# Patient Record
Sex: FEMALE | Race: WHITE | NOT HISPANIC OR LATINO | Employment: OTHER | URBAN - METROPOLITAN AREA
[De-identification: names, ages, dates, MRNs, and addresses within clinical notes are randomized per-mention and may not be internally consistent; named-entity substitution may affect disease eponyms.]

---

## 2017-01-01 PROBLEM — Z82.49 FAMILY HISTORY OF PREMATURE CORONARY ARTERY DISEASE: Status: ACTIVE | Noted: 2017-01-01

## 2017-06-21 DIAGNOSIS — E11.22 TYPE 2 DIABETES MELLITUS WITH STAGE 3 CHRONIC KIDNEY DISEASE, WITHOUT LONG-TERM CURRENT USE OF INSULIN (H): ICD-10-CM

## 2017-06-21 DIAGNOSIS — N18.30 TYPE 2 DIABETES MELLITUS WITH STAGE 3 CHRONIC KIDNEY DISEASE, WITHOUT LONG-TERM CURRENT USE OF INSULIN (H): ICD-10-CM

## 2017-06-21 NOTE — LETTER
[facility]                                           [address] [place]  [address]    June 26, 2017    Merline Max  [id number] [facility] [address]        Dear Merline,       We recently received a refill request for metFORMIN (GLUCOPHAGE) 500 MG tablet.  We have refilled this for a one time 30 day supply only because you are due for a:    Diabetes office visit and fasting lab appointment      Please schedule this lab appointment 4-5 days prior to the office visit.     Please call at your earliest convenience so that there will not be a delay with your future refills.          Thank you,   Your [facility] Care Team/carmelita  [phone]

## 2017-07-07 ENCOUNTER — TELEPHONE (OUTPATIENT)
Dept: ORTHOPEDICS | Facility: CLINIC | Age: 62
End: 2017-07-07

## 2017-09-24 ENCOUNTER — HEALTH MAINTENANCE LETTER (OUTPATIENT)
Age: 62
End: 2017-09-24

## 2018-05-15 ENCOUNTER — TRANSFERRED RECORDS (OUTPATIENT)
Dept: HEALTH INFORMATION MANAGEMENT | Facility: CLINIC | Age: 63
End: 2018-05-15

## 2020-01-09 ENCOUNTER — OFFICE VISIT (OUTPATIENT)
Dept: FAMILY MEDICINE | Facility: CLINIC | Age: 65
End: 2020-01-09
Payer: MEDICARE

## 2020-01-09 VITALS
BODY MASS INDEX: 32.62 KG/M2 | SYSTOLIC BLOOD PRESSURE: 130 MMHG | HEART RATE: 89 BPM | HEIGHT: 66 IN | WEIGHT: 203 LBS | DIASTOLIC BLOOD PRESSURE: 81 MMHG

## 2020-01-09 DIAGNOSIS — E78.5 HYPERLIPIDEMIA LDL GOAL <100: ICD-10-CM

## 2020-01-09 DIAGNOSIS — N18.30 TYPE 2 DIABETES MELLITUS WITH STAGE 3 CHRONIC KIDNEY DISEASE, WITHOUT LONG-TERM CURRENT USE OF INSULIN (H): Primary | ICD-10-CM

## 2020-01-09 DIAGNOSIS — I10 ESSENTIAL HYPERTENSION, BENIGN: ICD-10-CM

## 2020-01-09 DIAGNOSIS — Z80.0 FAMILY HISTORY OF COLON CANCER: ICD-10-CM

## 2020-01-09 DIAGNOSIS — N18.30 CKD (CHRONIC KIDNEY DISEASE) STAGE 3, GFR 30-59 ML/MIN (H): ICD-10-CM

## 2020-01-09 DIAGNOSIS — Z12.31 ENCOUNTER FOR SCREENING MAMMOGRAM FOR BREAST CANCER: ICD-10-CM

## 2020-01-09 DIAGNOSIS — E11.22 TYPE 2 DIABETES MELLITUS WITH STAGE 3 CHRONIC KIDNEY DISEASE, WITHOUT LONG-TERM CURRENT USE OF INSULIN (H): Primary | ICD-10-CM

## 2020-01-09 DIAGNOSIS — Z86.0100 HISTORY OF COLONIC POLYPS: ICD-10-CM

## 2020-01-09 DIAGNOSIS — Z23 NEED FOR PNEUMOCOCCAL VACCINE: ICD-10-CM

## 2020-01-09 LAB
ALBUMIN SERPL-MCNC: 4.1 G/DL (ref 3.4–5)
ALP SERPL-CCNC: 97 U/L (ref 40–150)
ALT SERPL W P-5'-P-CCNC: 99 U/L (ref 0–50)
ANION GAP SERPL CALCULATED.3IONS-SCNC: 8 MMOL/L (ref 3–14)
AST SERPL W P-5'-P-CCNC: 98 U/L (ref 0–45)
BILIRUB SERPL-MCNC: 0.7 MG/DL (ref 0.2–1.3)
BUN SERPL-MCNC: 35 MG/DL (ref 7–30)
CALCIUM SERPL-MCNC: 9.3 MG/DL (ref 8.5–10.1)
CHLORIDE SERPL-SCNC: 106 MMOL/L (ref 94–109)
CHOLEST SERPL-MCNC: 177 MG/DL
CO2 SERPL-SCNC: 27 MMOL/L (ref 20–32)
CREAT SERPL-MCNC: 1.02 MG/DL (ref 0.52–1.04)
CREAT UR-MCNC: 64 MG/DL
GFR SERPL CREATININE-BSD FRML MDRD: 58 ML/MIN/{1.73_M2}
GLUCOSE SERPL-MCNC: 119 MG/DL (ref 70–99)
HBA1C MFR BLD: 6.3 % (ref 0–5.6)
HDLC SERPL-MCNC: 44 MG/DL
LDLC SERPL CALC-MCNC: 86 MG/DL
MICROALBUMIN UR-MCNC: 1490 MG/L
MICROALBUMIN/CREAT UR: 2339.09 MG/G CR (ref 0–25)
NONHDLC SERPL-MCNC: 133 MG/DL
POTASSIUM SERPL-SCNC: 4.1 MMOL/L (ref 3.4–5.3)
PROT SERPL-MCNC: 8.3 G/DL (ref 6.8–8.8)
SODIUM SERPL-SCNC: 141 MMOL/L (ref 133–144)
T4 FREE SERPL-MCNC: 1.01 NG/DL (ref 0.76–1.46)
TRIGL SERPL-MCNC: 237 MG/DL
TSH SERPL DL<=0.005 MIU/L-ACNC: 4.37 MU/L (ref 0.4–4)

## 2020-01-09 PROCEDURE — 83036 HEMOGLOBIN GLYCOSYLATED A1C: CPT | Performed by: FAMILY MEDICINE

## 2020-01-09 PROCEDURE — G0009 ADMIN PNEUMOCOCCAL VACCINE: HCPCS | Performed by: FAMILY MEDICINE

## 2020-01-09 PROCEDURE — 80061 LIPID PANEL: CPT | Performed by: FAMILY MEDICINE

## 2020-01-09 PROCEDURE — 99203 OFFICE O/P NEW LOW 30 MIN: CPT | Mod: 25 | Performed by: FAMILY MEDICINE

## 2020-01-09 PROCEDURE — 99207 C FOOT EXAM  NO CHARGE: CPT | Performed by: FAMILY MEDICINE

## 2020-01-09 PROCEDURE — 84439 ASSAY OF FREE THYROXINE: CPT | Performed by: FAMILY MEDICINE

## 2020-01-09 PROCEDURE — 36415 COLL VENOUS BLD VENIPUNCTURE: CPT | Performed by: FAMILY MEDICINE

## 2020-01-09 PROCEDURE — 90670 PCV13 VACCINE IM: CPT | Performed by: FAMILY MEDICINE

## 2020-01-09 PROCEDURE — 82043 UR ALBUMIN QUANTITATIVE: CPT | Performed by: FAMILY MEDICINE

## 2020-01-09 PROCEDURE — 84443 ASSAY THYROID STIM HORMONE: CPT | Performed by: FAMILY MEDICINE

## 2020-01-09 PROCEDURE — 80053 COMPREHEN METABOLIC PANEL: CPT | Performed by: FAMILY MEDICINE

## 2020-01-09 ASSESSMENT — MIFFLIN-ST. JEOR: SCORE: 1482.55

## 2020-01-09 NOTE — PROGRESS NOTES
Subjective     Merline Max is a 64 year old female who presents to clinic today for the following health issues:    HPI   Diabetes Follow-up      How often are you checking your blood sugar? Once a month    What concerns do you have today about your diabetes? None     Do you have any of these symptoms? (Select all that apply)  No numbness or tingling in feet.  No redness, sores or blisters on feet.  No complaints of excessive thirst.  No reports of blurry vision.  No significant changes to weight. that she thinks is related to diabetes     Have you had a diabetic eye exam in the last 12 months? Yes- Date of last eye exam: 05/2019    Diabetes Management Resources    Hyperlipidemia Follow-Up      Are you regularly taking any medication or supplement to lower your cholesterol?   No    Are you having muscle aches or other side effects that you think could be caused by your cholesterol lowering medication?  No    Hypertension Follow-up      Do you check your blood pressure regularly outside of the clinic? Yes     Are you following a low salt diet? Yes    Are your blood pressures ever more than 140 on the top number (systolic) OR more   than 90 on the bottom number (diastolic), for example 140/90? No    BP Readings from Last 2 Encounters:   01/09/20 130/81   12/29/16 126/82     Hemoglobin A1C (%)   Date Value   01/09/2020 6.3 (H)   12/29/2016 6.1 (H)     LDL Cholesterol Calculated (mg/dL)   Date Value   06/15/2016 68   07/16/2012 86         Pt returning [facility] clinic from [facility]    Has diabetes well controlled on metformin 1000 bid  Is on statin/arb and aspirin  Due for eye exam and plans to have it completed here    Has stage 3 CKD on arb  BP well controlled on meds  On statin for cholesterol  Due for pneumo 13 and agreeable  Has personal h/o colon polyps and family history so will schedule colonoscopy              Reviewed and updated as needed this visit by Provider         Review of Systems   ROS COMP: Constitutional,  "HEENT, cardiovascular, pulmonary, gi and gu systems are negative, except as otherwise noted.      Objective    /81   Pulse 89   Ht 1.676 m (5' 6\")   Wt 92.1 kg (203 lb)   BMI 32.77 kg/m    Body mass index is 32.77 kg/m .  Physical Exam   GENERAL: healthy, alert and no distress  HENT: ear canals and TM's normal, nose and mouth without ulcers or lesions  NECK: no adenopathy, no asymmetry, masses, or scars and thyroid normal to palpation  RESP: lungs clear to auscultation - no rales, rhonchi or wheezes  CV: regular rate and rhythm, normal S1 S2, no S3 or S4, no murmur, click or rub, no peripheral edema and peripheral pulses strong  ABDOMEN: soft, nontender, no hepatosplenomegaly, no masses and bowel sounds normal  MS: no gross musculoskeletal defects noted, no edema    Diagnostic Test Results:  Labs reviewed in Epic        Assessment & Plan     1. Type 2 diabetes mellitus with stage 3 chronic kidney disease, without long-term current use of insulin (H)  At goal on meds, follow-up in 6 months  - Hemoglobin A1c  - TSH with free T4 reflex  - metFORMIN (GLUCOPHAGE) 500 MG tablet; TAKE 2 TABLETS BY MOUTH 2 TIMES DAILY WITH MEALS.-  Dispense: 360 tablet; Refill: 1    2. Essential hypertension, benign  At goal on meds  - Comprehensive metabolic panel    3. CKD (chronic kidney disease) stage 3, GFR 30-59 ml/min (H)  Stable on arb  - Albumin Random Urine Quantitative with Creat Ratio  - Comprehensive metabolic panel    4. Hyperlipidemia LDL goal <100  On statin  - Lipid panel reflex to direct LDL Fasting    5. Need for pneumococcal vaccine  given  - Pneumococcal vaccine 13 valent PCV13 IM (Prevnar) [73962]    6. Family history of colon cancer  Pt to schedule  - GASTROENTEROLOGY ADULT REF PROCEDURE ONLY [facility] [phone]    7. History of colonic polyps  same  - GASTROENTEROLOGY ADULT REF PROCEDURE ONLY [facility] [phone]    8. Encounter for screening mammogram for breast cancer  Pt to " "schedule  - *MA Screening Digital Bilateral; Future     BMI:   Estimated body mass index is 32.77 kg/m  as calculated from the following:    Height as of this encounter: 1.676 m (5' 6\").    Weight as of this encounter: 92.1 kg (203 lb).   Weight management plan: Discussed healthy diet and exercise guidelines            No follow-ups on file.    Tamika Nair MD  [facility]    "

## 2020-01-09 NOTE — NURSING NOTE
Prior to immunization administration, verified patients identity using patient s name and date of birth. Please see Immunization Activity for additional information.     Screening Questionnaire for Adult Immunization    Are you sick today?   No   Do you have allergies to medications, food, a vaccine component or latex?   No   Have you ever had a serious reaction after receiving a vaccination?   No   Do you have a long-term health problem with heart, lung, kidney, or metabolic disease (e.g., diabetes), asthma, a blood disorder, no spleen, complement component deficiency, a cochlear implant, or a spinal fluid leak?  Are you on long-term aspirin therapy?   yes   Do you have cancer, leukemia, HIV/AIDS, or any other immune system problem?   No   Do you have a parent, brother, or sister with an immune system problem?   No   In the past 3 months, have you taken medications that affect  your immune system, such as prednisone, other steroids, or anticancer drugs; drugs for the treatment of rheumatoid arthritis, Crohn s disease, or psoriasis; or have you had radiation treatments?   No   Have you had a seizure, or a brain or other nervous system problem?   No   During the past year, have you received a transfusion of blood or blood    products, or been given immune (gamma) globulin or antiviral drug?   No   For women: Are you pregnant or is there a chance you could become       pregnant during the next month?   No   Have you received any vaccinations in the past 4 weeks?   No     Immunization questionnaire was positive for at least one answer.  Notified Dr. Tamika Juarez  .           Screening performed by Sandra Almanza MA on 1/9/2020 at 12:05 PM.

## 2020-02-10 ASSESSMENT — MIFFLIN-ST. JEOR: SCORE: 1446.26

## 2020-02-14 ENCOUNTER — HOSPITAL ENCOUNTER (OUTPATIENT)
Facility: AMBULATORY SURGERY CENTER | Age: 65
Discharge: HOME | End: 2020-02-14
Attending: SURGERY | Admitting: SURGERY
Payer: MEDICARE

## 2020-02-14 VITALS
OXYGEN SATURATION: 96 % | RESPIRATION RATE: 16 BRPM | TEMPERATURE: 97.8 F | WEIGHT: 195 LBS | SYSTOLIC BLOOD PRESSURE: 107 MMHG | DIASTOLIC BLOOD PRESSURE: 67 MMHG | BODY MASS INDEX: 31.34 KG/M2 | HEART RATE: 74 BPM | HEIGHT: 66 IN

## 2020-02-14 LAB
COLONOSCOPY: NORMAL
GLUCOSE BLDC GLUCOMTR-MCNC: 112 MG/DL (ref 70–99)

## 2020-02-14 PROCEDURE — G0500 MOD SEDAT ENDO SERVICE >5YRS: HCPCS | Performed by: SURGERY

## 2020-02-14 PROCEDURE — 45385 COLONOSCOPY W/LESION REMOVAL: CPT | Mod: PT | Performed by: SURGERY

## 2020-02-14 PROCEDURE — G8916 PT W IV AB GIVEN ON TIME: HCPCS

## 2020-02-14 PROCEDURE — G8918 PT W/O PREOP ORDER IV AB PRO: HCPCS

## 2020-02-14 PROCEDURE — G8907 PT DOC NO EVENTS ON DISCHARG: HCPCS

## 2020-02-14 PROCEDURE — 45385 COLONOSCOPY W/LESION REMOVAL: CPT | Mod: PT

## 2020-02-14 RX ORDER — FLUMAZENIL 0.1 MG/ML
0.2 INJECTION, SOLUTION INTRAVENOUS
Status: DISCONTINUED | OUTPATIENT
Start: 2020-02-14 | End: 2020-02-15 | Stop reason: HOSPADM

## 2020-02-14 RX ORDER — ONDANSETRON 2 MG/ML
4 INJECTION INTRAMUSCULAR; INTRAVENOUS EVERY 6 HOURS PRN
Status: DISCONTINUED | OUTPATIENT
Start: 2020-02-14 | End: 2020-02-15 | Stop reason: HOSPADM

## 2020-02-14 RX ORDER — FENTANYL CITRATE 50 UG/ML
INJECTION, SOLUTION INTRAMUSCULAR; INTRAVENOUS PRN
Status: DISCONTINUED | OUTPATIENT
Start: 2020-02-14 | End: 2020-02-14 | Stop reason: HOSPADM

## 2020-02-14 RX ORDER — ONDANSETRON 2 MG/ML
4 INJECTION INTRAMUSCULAR; INTRAVENOUS
Status: DISCONTINUED | OUTPATIENT
Start: 2020-02-14 | End: 2020-02-15 | Stop reason: HOSPADM

## 2020-02-14 RX ORDER — LIDOCAINE 40 MG/G
CREAM TOPICAL
Status: DISCONTINUED | OUTPATIENT
Start: 2020-02-14 | End: 2020-02-15 | Stop reason: HOSPADM

## 2020-02-14 RX ORDER — NALOXONE HYDROCHLORIDE 0.4 MG/ML
.1-.4 INJECTION, SOLUTION INTRAMUSCULAR; INTRAVENOUS; SUBCUTANEOUS
Status: DISCONTINUED | OUTPATIENT
Start: 2020-02-14 | End: 2020-02-15 | Stop reason: HOSPADM

## 2020-02-14 RX ORDER — ONDANSETRON 4 MG/1
4 TABLET, ORALLY DISINTEGRATING ORAL EVERY 6 HOURS PRN
Status: DISCONTINUED | OUTPATIENT
Start: 2020-02-14 | End: 2020-02-15 | Stop reason: HOSPADM

## 2020-02-19 LAB — COPATH REPORT: NORMAL

## 2020-02-23 ENCOUNTER — HEALTH MAINTENANCE LETTER (OUTPATIENT)
Age: 65
End: 2020-02-23

## 2020-05-06 ENCOUNTER — TELEPHONE (OUTPATIENT)
Dept: LAB | Facility: CLINIC | Age: 65
End: 2020-05-06

## 2020-05-06 DIAGNOSIS — N18.30 CHRONIC KIDNEY DISEASE, STAGE III (MODERATE) (H): Primary | ICD-10-CM

## 2020-05-06 DIAGNOSIS — R80.9 PROTEINURIA: ICD-10-CM

## 2020-05-06 DIAGNOSIS — I10 HYPERTENSION, ESSENTIAL: ICD-10-CM

## 2020-05-06 DIAGNOSIS — E78.5 HYPERLIPEMIA: ICD-10-CM

## 2020-05-06 NOTE — TELEPHONE ENCOUNTER
Patient has orders for a blood pressure, pulse and weight from Dr.George Morgan at [facility].  Patient will make an Anicllary appointment with Lab appointment to follow on a day that her primary provider,  will be in clinic.  Please fax vitals to [phone].  Office phone number is [phone] if needed. Order will be abstracted.      Thank you-Tiffany Hui on 5/6/2020 at 5:13 PM

## 2020-05-11 ENCOUNTER — ALLIED HEALTH/NURSE VISIT (OUTPATIENT)
Dept: NURSING | Facility: CLINIC | Age: 65
End: 2020-05-11
Payer: MEDICARE

## 2020-05-11 VITALS
WEIGHT: 203 LBS | SYSTOLIC BLOOD PRESSURE: 102 MMHG | OXYGEN SATURATION: 98 % | DIASTOLIC BLOOD PRESSURE: 68 MMHG | HEART RATE: 101 BPM | BODY MASS INDEX: 32.77 KG/M2

## 2020-05-11 DIAGNOSIS — N18.30 CKD (CHRONIC KIDNEY DISEASE) STAGE 3, GFR 30-59 ML/MIN (H): Primary | ICD-10-CM

## 2020-05-11 DIAGNOSIS — I10 HYPERTENSION, ESSENTIAL: ICD-10-CM

## 2020-05-11 DIAGNOSIS — E78.5 HYPERLIPEMIA: ICD-10-CM

## 2020-05-11 DIAGNOSIS — R80.9 PROTEINURIA: ICD-10-CM

## 2020-05-11 DIAGNOSIS — N18.30 CHRONIC KIDNEY DISEASE, STAGE III (MODERATE) (H): ICD-10-CM

## 2020-05-11 LAB
ALBUMIN UR-MCNC: 30 MG/DL
ANION GAP SERPL CALCULATED.3IONS-SCNC: 6 MMOL/L (ref 3–14)
APPEARANCE UR: CLEAR
BILIRUB UR QL STRIP: NEGATIVE
BUN SERPL-MCNC: 29 MG/DL (ref 7–30)
CALCIUM SERPL-MCNC: 9.5 MG/DL (ref 8.5–10.1)
CHLORIDE SERPL-SCNC: 104 MMOL/L (ref 94–109)
CO2 SERPL-SCNC: 30 MMOL/L (ref 20–32)
COLOR UR AUTO: YELLOW
CREAT SERPL-MCNC: 1.08 MG/DL (ref 0.52–1.04)
CREAT UR-MCNC: 82 MG/DL
GFR SERPL CREATININE-BSD FRML MDRD: 54 ML/MIN/{1.73_M2}
GLUCOSE SERPL-MCNC: 127 MG/DL (ref 70–99)
GLUCOSE UR STRIP-MCNC: NEGATIVE MG/DL
HGB UR QL STRIP: ABNORMAL
KETONES UR STRIP-MCNC: NEGATIVE MG/DL
LEUKOCYTE ESTERASE UR QL STRIP: ABNORMAL
NITRATE UR QL: NEGATIVE
PH UR STRIP: 5 PH (ref 5–7)
POTASSIUM SERPL-SCNC: 4.2 MMOL/L (ref 3.4–5.3)
PROT UR-MCNC: 0.51 G/L
PROT/CREAT 24H UR: 0.62 G/G CR (ref 0–0.2)
RBC #/AREA URNS AUTO: ABNORMAL /HPF
SODIUM SERPL-SCNC: 140 MMOL/L (ref 133–144)
SOURCE: ABNORMAL
SP GR UR STRIP: 1.02 (ref 1–1.03)
UROBILINOGEN UR STRIP-ACNC: 0.2 EU/DL (ref 0.2–1)
WBC #/AREA URNS AUTO: ABNORMAL /HPF

## 2020-05-11 PROCEDURE — 99207 ZZC NO CHARGE NURSE ONLY: CPT

## 2020-05-11 PROCEDURE — 81001 URINALYSIS AUTO W/SCOPE: CPT | Performed by: INTERNAL MEDICINE

## 2020-05-11 PROCEDURE — 80048 BASIC METABOLIC PNL TOTAL CA: CPT | Performed by: INTERNAL MEDICINE

## 2020-05-11 PROCEDURE — 84156 ASSAY OF PROTEIN URINE: CPT | Performed by: INTERNAL MEDICINE

## 2020-05-11 PROCEDURE — 36415 COLL VENOUS BLD VENIPUNCTURE: CPT | Performed by: INTERNAL MEDICINE

## 2020-05-11 NOTE — PROGRESS NOTES
Merline Max is a 64 year old patient who comes in today for a Blood Pressure check.  Initial BP:  /68   Pulse 101   Wt 92.1 kg (203 lb)   SpO2 98%   BMI 32.77 kg/m       101  Disposition: faxed to: Dr. Finn Morgan @ [phone]  [facility].          Tameka Sun MA

## 2020-05-11 NOTE — LETTER
Merline Max is a 64 year old patient who comes in today for a Blood Pressure check.  Initial BP:  /68   Pulse 101   Wt 92.1 kg (203 lb)   SpO2 98%   BMI 32.77 kg/m       101  Disposition: faxed to: Dr. Finn Morgan @ [phone]  [facility].          Tameka Sun MA

## 2020-06-09 ENCOUNTER — TRANSFERRED RECORDS (OUTPATIENT)
Dept: HEALTH INFORMATION MANAGEMENT | Facility: CLINIC | Age: 65
End: 2020-06-09

## 2020-07-14 NOTE — PROGRESS NOTES
Subjective     Merline Max is a 64 year old female who presents to clinic today for the following health issues:    HPI       Diabetes Follow-up      How often are you checking your blood sugar? Not at all    What concerns do you have today about your diabetes? None     Do you have any of these symptoms? (Select all that apply)  Numbness in feet    Have you had a diabetic eye exam in the last 12 months? No delayed due to covid, will make an appointment to day          Hyperlipidemia Follow-Up      Are you regularly taking any medication or supplement to lower your cholesterol?   Yes- simvastatin    Are you having muscle aches or other side effects that you think could be caused by your cholesterol lowering medication?  No    Hypertension Follow-up      Do you check your blood pressure regularly outside of the clinic? Yes     Are you following a low salt diet? No    Are your blood pressures ever more than 140 on the top number (systolic) OR more   than 90 on the bottom number (diastolic), for example 140/90? No    BP Readings from Last 2 Encounters:   07/15/20 135/80   05/11/20 102/68     Hemoglobin A1C (%)   Date Value   07/15/2020 6.0 (H)   01/09/2020 6.3 (H)     LDL Cholesterol Calculated (mg/dL)   Date Value   01/09/2020 86   06/15/2016 68     Pt with type 2 diabetes on metformin.   It is well controlled  Is on arb, aspirin and statin  Overdue for eye exam    Pt with CKD on arb. It is stable. She sees nephrology  Pt with HTN on hydrochlorothiazide and avapro it is well controlled today  Pt with elevated liver enzymes at last check . Will recheck these  Pt with elevated cholesterol on statin  Pt due for pneumonia shot and is agreeable    Pt with bilateral hip pain , worse on right than left  Worse with use  Pain mainly over lateral hip more than back or groin.   Pain can radiate down side of leg                Reviewed and updated as needed this visit by Provider         Review of Systems   Constitutional, HEENT,  "cardiovascular, pulmonary, gi and gu systems are negative, except as otherwise noted.      Objective    /80   Pulse 84   Temp 98.1  F (36.7  C) (Oral)   Ht 1.676 m (5' 6\")   Wt 90.7 kg (200 lb)   BMI 32.28 kg/m    Body mass index is 32.28 kg/m .  Physical Exam   GENERAL: healthy, alert and no distress  EYES: Eyes grossly normal to inspection, PERRL and conjunctivae and sclerae normal  HENT: ear canals and TM's normal, nose and mouth without ulcers or lesions  NECK: no adenopathy, no asymmetry, masses, or scars and thyroid normal to palpation  RESP: lungs clear to auscultation - no rales, rhonchi or wheezes  CV: regular rate and rhythm, normal S1 S2, no S3 or S4, no murmur, click or rub, no peripheral edema and peripheral pulses strong  ABDOMEN: soft, nontender, no hepatosplenomegaly, no masses and bowel sounds normal  MS: no gross musculoskeletal defects noted, no edema, pain to palpation over right greater trochanter. No lumbar spine or paraspinous muscle pain. No pain with internal/external rotation of hip  NEURO: Normal strength and tone, mentation intact and speech normal  PSYCH: mentation appears normal, affect normal/bright    Diagnostic Test Results:  Labs reviewed in Epic        Assessment & Plan     1. Encounter for Medicare annual wellness exam  See below    2. Type 2 diabetes mellitus with stage 3 chronic kidney disease, without long-term current use of insulin (H)  Well controlled on meds, follow-up in 6 months  - metFORMIN (GLUCOPHAGE) 500 MG tablet; Take 2 tablets (1,000 mg) by mouth 2 times daily (with meals)  Dispense: 360 tablet; Refill: 1  - Hemoglobin A1c  - OFFICE/OUTPT VISIT,EST,LEVL IV    3. CKD (chronic kidney disease) stage 3, GFR 30-59 ml/min (H)  Stable on meds  - OFFICE/OUTPT VISIT,EST,LEVL IV    4. Essential hypertension, benign  At goal on meds  - OFFICE/OUTPT VISIT,EST,LEVL IV    5. Elevated liver enzymes  recheck  - Hepatic panel (Albumin, ALT, AST, Bili, Alk Phos, TP)  - " "OFFICE/OUTPT VISIT,EST,LEVL IV    6. Trochanteric bursitis of both hips  Follow-up with sports med for treatment  - Orthopedic & Spine  Referral; Future  - OFFICE/OUTPT VISIT,EST,DACIAL IV    7. Hyperlipidemia LDL goal <100  On statin  - OFFICE/OUTPT VISIT,EST,LEVL IV    8. Need for pneumococcal vaccine  given  - Pneumococcal vaccine 23 valent PPSV23  (Pneumovax) [17752]     BMI:   Estimated body mass index is 32.28 kg/m  as calculated from the following:    Height as of this encounter: 1.676 m (5' 6\").    Weight as of this encounter: 90.7 kg (200 lb).   Weight management plan: Discussed healthy diet and exercise guidelines            Return in about 53 weeks (around 7/21/2021) for Annual Wellness Visit.    Tamika Brown MD  [facility]    SUBJECTIVE:   Merline Max is a 64 year old female who presents for Preventive Visit.      Are you in the first 12 months of your Medicare Part B coverage?  No    Physical Health:    In general, how would you rate your overall physical health? excellent    Outside of work, how many days during the week do you exercise? 4-5 days/week    Outside of work, approximately how many minutes a day do you exercise?15-30 minutes    If you drink alcohol do you typically have >3 drinks per day or >7 drinks per week? No    Do you usually eat at least 4 servings of fruit and vegetables a day, include whole grains & fiber and avoid regularly eating high fat or \"junk\" foods? Yes    Do you have any problems taking medications regularly?  No    Do you have any side effects from medications? none    Needs assistance for the following daily activities: no assistance needed    Which of the following safety concerns are present in your home?  none identified     Hearing impairment: No    In the past 6 months, have you been bothered by leaking of urine? no    Mental Health:    In general, how would you rate your overall mental or emotional health? excellent  PHQ-2 Score: " 0    Do you feel safe in your environment? Yes    Have you ever done Advance Care Planning? (For example, a Health Directive, POLST, or a discussion with a medical provider or your loved ones about your wishes): No, advance care planning information given to patient to review.  Patient declined advance care planning discussion at this time.    Additional concerns to address?  No    Fall risk:  Fallen 2 or more times in the past year?: No  Any fall with injury in the past year?: No    Cognitive Screenin) Repeat 3 items (Leader, Season, Table)      2) Clock draw:   NORMAL  3) 3 item recall: Recalls 3 objects  Results: 3 items recalled: COGNITIVE IMPAIRMENT LESS LIKELY    Mini-CogTM Copyright S Donya. Licensed by the author for use in [facility]; reprinted with permission (karolina@.[facility]). All rights reserved.      Do you have sleep apnea, excessive snoring or daytime drowsiness?: no            Reviewed and updated as needed this visit by clinical staff  Tobacco  Allergies  Meds  Med Hx  Surg Hx  Fam Hx  Soc Hx        Reviewed and updated as needed this visit by Provider        Social History     Tobacco Use     Smoking status: Never Smoker     Smokeless tobacco: Never Used     Tobacco comment: Lives in smoke free household   Substance Use Topics     Alcohol use: Yes     Comment: two to three drinks a week                           Current providers sharing in care for this patient include:   Patient Care Team:  Abida Polk [place] as PCP - General (Clinic)  Tamika Brown MD as Assigned PCP    The following health maintenance items are reviewed in Epic and correct as of today:  Health Maintenance   Topic Date Due     HEPATITIS B IMMUNIZATION (1 of 3 - Risk 3-dose series) 10/21/1973     ZOSTER IMMUNIZATION (1 of 2) 10/21/2004     EYE EXAM  11/15/2017     MAMMO SCREENING  2018     MEDICARE ANNUAL WELLNESS VISIT  10/21/2019     FALL RISK ASSESSMENT  10/21/2019     PNEUMOCOCCAL  "IMMUNIZATION 65+ HIGH/HIGHEST RISK (2 of 2 - PPSV23) 03/05/2020     A1C  07/09/2020     INFLUENZA VACCINE (1) 09/01/2020     LIPID  01/09/2021     DIABETIC FOOT EXAM  01/09/2021     BMP  05/11/2021     MICROALBUMIN  05/11/2021     ADVANCE CARE PLANNING  06/15/2021     DTAP/TDAP/TD IMMUNIZATION (3 - Td) 07/16/2022     COLORECTAL CANCER SCREENING  02/14/2023     DEXA  Completed     HEPATITIS C SCREENING  Completed     PHQ-2  Completed     HIV SCREENING  Addressed     IPV IMMUNIZATION  Aged Out     MENINGITIS IMMUNIZATION  Aged Out     Lab work is in process  Pneumonia Vaccine:Adults age 65+ who received Pneumovax (PPSV23) at 65 years or older: Should be given PCV13 > 1 year after their most recent PPSV23  Mammogram Screening: Mammogram Screening: Patient over age 50, mutual decision to screen reflected in health maintenance.    ROS:  Constitutional, HEENT, cardiovascular, pulmonary, gi and gu systems are negative, except as otherwise noted.    OBJECTIVE:   /80   Pulse 84   Temp 98.1  F (36.7  C) (Oral)   Ht 1.676 m (5' 6\")   Wt 90.7 kg (200 lb)   BMI 32.28 kg/m   Estimated body mass index is 32.28 kg/m  as calculated from the following:    Height as of this encounter: 1.676 m (5' 6\").    Weight as of this encounter: 90.7 kg (200 lb).  EXAM:   See other note for exam    Diagnostic Test Results:  Labs reviewed in Epic    ASSESSMENT / PLAN:   1. Encounter for Medicare annual wellness exam  completed  COUNSELING:  Reviewed preventive health counseling, as reflected in patient instructions       Regular exercise       Healthy diet/nutrition       Vision screening       Dental care    Estimated body mass index is 32.28 kg/m  as calculated from the following:    Height as of this encounter: 1.676 m (5' 6\").    Weight as of this encounter: 90.7 kg (200 lb).    Weight management plan: Discussed healthy diet and exercise guidelines     reports that she has never smoked. She has never used smokeless " tobacco.      Appropriate preventive services were discussed with this patient, including applicable screening as appropriate for cardiovascular disease, diabetes, osteopenia/osteoporosis, and glaucoma.  As appropriate for age/gender, discussed screening for colorectal cancer, prostate cancer, breast cancer, and cervical cancer. Checklist reviewing preventive services available has been given to the patient.    Reviewed patients plan of care and provided an AVS. The Intermediate Care Plan ( asthma action plan, low back pain action plan, and migraine action plan) for Merline meets the Care Plan requirement. This Care Plan has been established and reviewed with the Patient.    Counseling Resources:  ATP IV Guidelines  Pooled Cohorts Equation Calculator  Breast Cancer Risk Calculator  FRAX Risk Assessment  ICSI Preventive Guidelines  Dietary Guidelines for Americans, 2010  [employer] MyPlate  ASA Prophylaxis  Lung CA Screening    Tamika Brown MD  [facility]

## 2020-07-15 ENCOUNTER — OFFICE VISIT (OUTPATIENT)
Dept: FAMILY MEDICINE | Facility: CLINIC | Age: 65
End: 2020-07-15
Payer: MEDICARE

## 2020-07-15 VITALS
HEIGHT: 66 IN | DIASTOLIC BLOOD PRESSURE: 80 MMHG | HEART RATE: 84 BPM | TEMPERATURE: 98.1 F | BODY MASS INDEX: 32.14 KG/M2 | SYSTOLIC BLOOD PRESSURE: 135 MMHG | WEIGHT: 200 LBS

## 2020-07-15 DIAGNOSIS — R74.8 ELEVATED LIVER ENZYMES: ICD-10-CM

## 2020-07-15 DIAGNOSIS — M70.61 TROCHANTERIC BURSITIS OF BOTH HIPS: ICD-10-CM

## 2020-07-15 DIAGNOSIS — Z00.00 ENCOUNTER FOR MEDICARE ANNUAL WELLNESS EXAM: Primary | ICD-10-CM

## 2020-07-15 DIAGNOSIS — I10 ESSENTIAL HYPERTENSION, BENIGN: ICD-10-CM

## 2020-07-15 DIAGNOSIS — N18.30 TYPE 2 DIABETES MELLITUS WITH STAGE 3 CHRONIC KIDNEY DISEASE, WITHOUT LONG-TERM CURRENT USE OF INSULIN (H): ICD-10-CM

## 2020-07-15 DIAGNOSIS — M70.62 TROCHANTERIC BURSITIS OF BOTH HIPS: ICD-10-CM

## 2020-07-15 DIAGNOSIS — E78.5 HYPERLIPIDEMIA LDL GOAL <100: ICD-10-CM

## 2020-07-15 DIAGNOSIS — N18.30 CKD (CHRONIC KIDNEY DISEASE) STAGE 3, GFR 30-59 ML/MIN (H): ICD-10-CM

## 2020-07-15 DIAGNOSIS — Z23 NEED FOR PNEUMOCOCCAL VACCINE: ICD-10-CM

## 2020-07-15 DIAGNOSIS — E11.22 TYPE 2 DIABETES MELLITUS WITH STAGE 3 CHRONIC KIDNEY DISEASE, WITHOUT LONG-TERM CURRENT USE OF INSULIN (H): ICD-10-CM

## 2020-07-15 LAB
ALBUMIN SERPL-MCNC: 3.9 G/DL (ref 3.4–5)
ALP SERPL-CCNC: 84 U/L (ref 40–150)
ALT SERPL W P-5'-P-CCNC: 79 U/L (ref 0–50)
AST SERPL W P-5'-P-CCNC: 64 U/L (ref 0–45)
BILIRUB DIRECT SERPL-MCNC: 0.1 MG/DL (ref 0–0.2)
BILIRUB SERPL-MCNC: 0.6 MG/DL (ref 0.2–1.3)
HBA1C MFR BLD: 6 % (ref 0–5.6)
PROT SERPL-MCNC: 8.1 G/DL (ref 6.8–8.8)

## 2020-07-15 PROCEDURE — 36415 COLL VENOUS BLD VENIPUNCTURE: CPT | Performed by: FAMILY MEDICINE

## 2020-07-15 PROCEDURE — 99214 OFFICE O/P EST MOD 30 MIN: CPT | Mod: 25 | Performed by: FAMILY MEDICINE

## 2020-07-15 PROCEDURE — 90732 PPSV23 VACC 2 YRS+ SUBQ/IM: CPT | Performed by: FAMILY MEDICINE

## 2020-07-15 PROCEDURE — G0009 ADMIN PNEUMOCOCCAL VACCINE: HCPCS | Performed by: FAMILY MEDICINE

## 2020-07-15 PROCEDURE — 83036 HEMOGLOBIN GLYCOSYLATED A1C: CPT | Performed by: FAMILY MEDICINE

## 2020-07-15 PROCEDURE — 80076 HEPATIC FUNCTION PANEL: CPT | Performed by: FAMILY MEDICINE

## 2020-07-15 PROCEDURE — G0402 INITIAL PREVENTIVE EXAM: HCPCS | Performed by: FAMILY MEDICINE

## 2020-07-15 ASSESSMENT — MIFFLIN-ST. JEOR: SCORE: 1468.94

## 2020-07-17 ENCOUNTER — ANCILLARY PROCEDURE (OUTPATIENT)
Dept: MAMMOGRAPHY | Facility: CLINIC | Age: 65
End: 2020-07-17
Payer: MEDICARE

## 2020-07-17 DIAGNOSIS — Z12.31 ENCOUNTER FOR SCREENING MAMMOGRAM FOR BREAST CANCER: ICD-10-CM

## 2020-07-17 PROCEDURE — 77067 SCR MAMMO BI INCL CAD: CPT | Mod: TC

## 2020-07-18 ENCOUNTER — OFFICE VISIT (OUTPATIENT)
Dept: ORTHOPEDICS | Facility: CLINIC | Age: 65
End: 2020-07-18
Attending: FAMILY MEDICINE
Payer: MEDICARE

## 2020-07-18 ENCOUNTER — ANCILLARY PROCEDURE (OUTPATIENT)
Dept: GENERAL RADIOLOGY | Facility: CLINIC | Age: 65
End: 2020-07-18
Attending: PHYSICAL MEDICINE & REHABILITATION
Payer: MEDICARE

## 2020-07-18 VITALS
BODY MASS INDEX: 32.14 KG/M2 | HEIGHT: 66 IN | SYSTOLIC BLOOD PRESSURE: 121 MMHG | DIASTOLIC BLOOD PRESSURE: 80 MMHG | WEIGHT: 200 LBS

## 2020-07-18 DIAGNOSIS — M25.552 BILATERAL HIP PAIN: ICD-10-CM

## 2020-07-18 DIAGNOSIS — M25.552 BILATERAL HIP PAIN: Primary | ICD-10-CM

## 2020-07-18 DIAGNOSIS — M70.61 TROCHANTERIC BURSITIS OF BOTH HIPS: ICD-10-CM

## 2020-07-18 DIAGNOSIS — M25.551 BILATERAL HIP PAIN: Primary | ICD-10-CM

## 2020-07-18 DIAGNOSIS — M25.551 BILATERAL HIP PAIN: ICD-10-CM

## 2020-07-18 DIAGNOSIS — M70.62 TROCHANTERIC BURSITIS OF BOTH HIPS: ICD-10-CM

## 2020-07-18 PROCEDURE — 99204 OFFICE O/P NEW MOD 45 MIN: CPT | Performed by: PHYSICAL MEDICINE & REHABILITATION

## 2020-07-18 PROCEDURE — 73522 X-RAY EXAM HIPS BI 3-4 VIEWS: CPT

## 2020-07-18 ASSESSMENT — MIFFLIN-ST. JEOR: SCORE: 1468.94

## 2020-07-18 NOTE — LETTER
7/18/2020         RE: Merline Max  [id number]  [facility] [address]        Dear Colleague,    Thank you for referring your patient, Merline Max, to the [facility]. Please see a copy of my visit note below.    Sports Medicine Clinic Visit    PCP: Abida Columbus Gianni    CC: Patient presents with:  Left Hip - Pain  Right Hip - Pain      HPI:  Merline Max is a 64 year old female who is seen in consultation at the request of Dr. Brown.   She notes bilateral hip pain that began 5 years ago with gradually onset. She notes it alternates back and forth to the right and left hip. She notes once in awhile she feels pain in the groin and feels some instability. However, usually she has pain over the lateral hip that radiates down the leg. She has occasional back pain and has had sciatica in the past and this pain does not feel like that. She rates the pain at a  6/10 at its worst and a 3/10 currently.  Symptoms are relieved with massaging the lateral leg sometimes and 600 mg of ibuprofen. Symptoms are worsened by nothing specific. She endorses instability.   She denies popping, grinding, numbness, tingling and weakness.  Other treatment has included cold compresses and heat. She notes difficulty with sleeping sometimes.       Review of Systems:  Musculoskeletal: as above  Remainder of review of systems is negative including constitutional, eyes, ENT, CV, pulmonary, GI, , endocrine, skin, hematologic, and neurologic except as noted in HPI or medical history.    History reviewed. No pertinent past surgical/medical/family/social history other than as mentioned in HPI.    Patient Active Problem List   Diagnosis     Dry eye syndrome     Recurrent iritis right eye     Ocular hypertension     Type 2 diabetes, HbA1c goal < 7% (H)     Blurred vision     Gouty arthritis     Essential hypertension, benign     IgA nephropathy     Hyperlipidemia LDL goal <100     Fatigue     Elevated  LFTs     Colon polyp     Acute iritis left eye     Advanced directives, counseling/discussion     CKD (chronic kidney disease) stage 3, GFR 30-59 ml/min (H)     Family history of premature coronary artery disease     Past Medical History:   Diagnosis Date     Arthritis      Fatigue 2012     Fracture     stress foot     Gout      Hyperlipidemia      Hypertension      Nephropathy     IGA     Nonsenile cataract      Type 2 diabetes, HbA1c goal < 7% (H) 2012     Uveitis 11-    iritis right eye since      Past Surgical History:   Procedure Laterality Date     ABDOMEN SURGERY  , , , , ,    3 C-Sections, Total Hysterectomy, Gallbladder Removal,     BIOPSY KIDNEY      Ig A nephropathy     CHOLECYSTECTOMY       COLONOSCOPY  2013    Procedure: COLONOSCOPY;  COLONOSCOPY - POLYP/ SANTILLI;  Surgeon: Ulices Gregory MD;  Location: [facility]     COLONOSCOPY  10/1/2013    Procedure: COMBINED COLONOSCOPY, SINGLE BIOPSY/POLYPECTOMY BY BIOPSY;;  Surgeon: Ulices Gregory MD;  Location: [facility]     COLONOSCOPY N/A 2014    Procedure: COMBINED COLONOSCOPY, SINGLE OR MULTIPLE BIOPSY/POLYPECTOMY BY BIOPSY;  Surgeon: Ulices Gregory MD;  Location: [facility]     COLONOSCOPY WITH CO2 INSUFFLATION N/A 2014    Procedure: COLONOSCOPY WITH CO2 INSUFFLATION;  Surgeon: Ulices Gregory MD;  Location: [facility]     COLONOSCOPY WITH CO2 INSUFFLATION N/A 2020    Procedure: COLONOSCOPY, WITH CO2 INSUFFLATION;  Surgeon: Eliud Huang DO;  Location: [facility]     FOOT SURGERY      Lt foot.      GYN SURGERY       x3     HERNIORRHAPHY UMBILICAL       HYSTERECTOMY TOTAL ABDOMINAL, BILATERAL SALPINGO-OOPHORECTOMY, COMBINED      cyst, endometriosis     HYSTERECTOMY, PAP NO LONGER INDICATED       Family History   Problem Relation Age of Onset     Cancer Father         adrenal and lung     Other Cancer Father         Adrenal & Lung     Cancer Mother 64        colon       Colon Cancer Mother      Other Cancer Mother         Colon     Glaucoma Maternal Aunt      Thyroid Disease Maternal Aunt      Cancer Maternal Aunt      Glaucoma Maternal Grandmother         corneal transplant     EYE* Maternal Grandmother         corneal transplant     Hypertension Maternal Grandmother      Cerebrovascular Disease Maternal Grandmother 85        TIA     Cancer Maternal Grandfather      Cancer Paternal Grandmother      Asthma Daughter         SInce birth, small lung function     C.A.D. Brother 46        quad bypass     Breast Cancer Other 80        Maternal great aunt     Breast Cancer Other      Asthma Daughter      Diabetes No family hx of      Macular Degeneration No family hx of      Social History     Socioeconomic History     Marital status:      Spouse name: Not on file     Number of children: Not on file     Years of education: Not on file     Highest education level: Not on file   Occupational History     Not on file   Social Needs     Financial resource strain: Not on file     Food insecurity     Worry: Not on file     Inability: Not on file     Transportation needs     Medical: Not on file     Non-medical: Not on file   Tobacco Use     Smoking status: Never Smoker     Smokeless tobacco: Never Used     Tobacco comment: Lives in smoke free household   Substance and Sexual Activity     Alcohol use: Yes     Comment: two to three drinks a week     Drug use: No     Sexual activity: Yes     Partners: Male     Birth control/protection: Post-menopausal, Female Surgical   Lifestyle     Physical activity     Days per week: Not on file     Minutes per session: Not on file     Stress: Not on file   Relationships     Social connections     Talks on phone: Not on file     Gets together: Not on file     Attends [employer] service: Not on file     Active member of club or organization: Not on file     Attends meetings of clubs or organizations: Not on file     Relationship status: Not on file      "Intimate partner violence     Fear of current or ex partner: Not on file     Emotionally abused: Not on file     Physically abused: Not on file     Forced sexual activity: Not on file   Other Topics Concern     Parent/sibling w/ CABG, MI or angioplasty before 65F 55M? Yes     Comment: Brother had Quadruple Bypass Surgery at 46, still living   Social History Narrative     9/2/1983 Jean    They have three daughter Mily 1978, Jessica, 1984, Richa 1986    Grandchildren 6 plus one on the way.     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     Current Outpatient Medications   Medication     allopurinol (ZYLOPRIM) 100 MG tablet     Aspirin (ASPIR-81 PO)     blood glucose (BEATRIZ CONTOUR) test strip     cetirizine (ZYRTEC) 10 MG tablet     docusate sodium (COLACE) 100 MG capsule     hydrochlorothiazide (MICROZIDE) 12.5 MG capsule     irbesartan (AVAPRO) 300 MG tablet     ketotifen (ZADITOR) 0.025 % SOLN     Lancet Devices (AUTO-LANCET) MISC     lancets thin MISC     metFORMIN (GLUCOPHAGE) 500 MG tablet     methylcellulose, laxative, (CITRUCEL) POWD     polyethylene glycol - propylene glycol (SYSTANE) 0.4-0.3 % SOLN     simvastatin (ZOCOR) 20 MG tablet     No current facility-administered medications for this visit.      No Known Allergies      Objective:  /80   Ht 1.676 m (5' 6\")   Wt 90.7 kg (200 lb)   BMI 32.28 kg/m      General: Alert and in no distress    Head: Normocephalic, atraumatic  Eyes: no scleral icterus or conjunctival erythema   Skin: no erythema, petechiae, or jaundice  CV: regular rhythm by palpation, 2+ distal pulses  Resp: normal respiratory effort without conversational dyspnea   Psych: normal mood and affect    Gait: Non-antalgic, appropriate coordination and balance   Neuro: Motor strength and sensation as noted below    Musculoskeletal:    Bilateral hip exam    Inspection:      no edema or ecchymosis in hip " area    Palpation:  -Mildly tender over the left greater trochanteric bursa    ROM:     Full active and passive ROM bilaterally.  Left hip internal rotation causes mild left lateral hip pain.    Strength:   Left:  5/5 hip flexion/abduction/adduction, 5/5 knee extension/flexion, 5/5 ankle dorsiflexion/plantarflexion  Right:  5/5 hip flexion/abduction/adduction, 5/5 knee extension/flexion, 5/5 ankle dorsiflexion/plantarflexion    Sensation: grossly intact to light touch in the lower extremities bilaterally    Radiology:  X-rays ordered and independent visualization of images performed and reviewed with Merline.    Recent Results (from the past 744 hour(s))   XR Pelvis w bilateral hips 1 View    Narrative    XR PELVIS AND BILATERAL HIP ONE VIEW   7/18/2020 11:28 AM     HISTORY:  Bilateral hip pain.      FINDINGS: Lower lumbar spine degenerative change.      Impression    IMPRESSION: Unremarkable hips.       Assessment:  1. Bilateral hip pain    2. Trochanteric bursitis of both hips        Plan:  Discussed the assessment with the patient and developed a plan together:  -Rehab: Physical Therapy: [facility] - [phone]. Please do 5-6 days of exercises per week between formal sessions and the home exercises they provide.  -Ice or heat 15-20 minutes as needed (Avoid sleeping on a heating pad or ice)  -Patient's preferred over the counter medication as directed on packaging as needed for pain or soreness.  -Recommend low impact cardiovascular exercise (walking, stationary biking, elliptical, aquatic exercise)    -We also discussed other future treatment options:  Steroid injections    Follow Up: As needed if symptoms fail to improve or worsen. Please call with any questions or concerns.       Francheska Stahl MD, [facility]  [facility]    Again, thank you for allowing me to participate in the care of your patient.        Sincerely,        Tameka Stahl MD

## 2020-07-18 NOTE — PATIENT INSTRUCTIONS
Today's Plan of Care:  -Rehab: Physical Therapy: [facility] - [phone]. Please do 5-6 days of exercises per week between formal sessions and the home exercises they provide.  -Ice or heat 15-20 minutes as needed (Avoid sleeping on a heating pad or ice)  -Patient's preferred over the counter medication as directed on packaging as needed for pain or soreness.  -Recommend low impact cardiovascular exercise (walking, stationary biking, elliptical, aquatic exercise)    -We also discussed other future treatment options:  Steroid injections    Follow Up: As needed if symptoms fail to improve or worsen. Please call with any questions or concerns.

## 2020-07-20 ENCOUNTER — THERAPY VISIT (OUTPATIENT)
Dept: PHYSICAL THERAPY | Facility: CLINIC | Age: 65
End: 2020-07-20
Attending: PHYSICAL MEDICINE & REHABILITATION
Payer: MEDICARE

## 2020-07-20 DIAGNOSIS — M25.551 BILATERAL HIP PAIN: ICD-10-CM

## 2020-07-20 DIAGNOSIS — M70.61 TROCHANTERIC BURSITIS OF BOTH HIPS: Primary | ICD-10-CM

## 2020-07-20 DIAGNOSIS — M25.552 BILATERAL HIP PAIN: ICD-10-CM

## 2020-07-20 DIAGNOSIS — M70.62 TROCHANTERIC BURSITIS OF BOTH HIPS: Primary | ICD-10-CM

## 2020-07-20 PROCEDURE — 97110 THERAPEUTIC EXERCISES: CPT | Mod: GP | Performed by: PHYSICAL THERAPIST

## 2020-07-20 PROCEDURE — 97161 PT EVAL LOW COMPLEX 20 MIN: CPT | Mod: GP | Performed by: PHYSICAL THERAPIST

## 2020-07-20 ASSESSMENT — ACTIVITIES OF DAILY LIVING (ADL)
HOS_ADL_SCORE(%): 75
DEEP_SQUATTING: MODERATE DIFFICULTY
WALKING_INITIALLY: SLIGHT DIFFICULTY
SITTING_FOR_15_MINUTES: NO DIFFICULTY AT ALL
ROLLING_OVER_IN_BED: SLIGHT DIFFICULTY
RECREATIONAL_ACTIVITIES: NO DIFFICULTY AT ALL
WALKING_APPROXIMATELY_10_MINUTES: NO DIFFICULTY AT ALL
HOS_ADL_HIGHEST_POTENTIAL_SCORE: 64
WALKING_15_MINUTES_OR_GREATER: SLIGHT DIFFICULTY
GOING_UP_1_FLIGHT_OF_STAIRS: SLIGHT DIFFICULTY
GETTING_INTO_AND_OUT_OF_AN_AVERAGE_CAR: SLIGHT DIFFICULTY
HOS_ADL_ITEM_SCORE_TOTAL: 48
LIGHT_TO_MODERATE_WORK: NO DIFFICULTY AT ALL
STANDING_FOR_15_MINUTES: NO DIFFICULTY AT ALL
HOS_ADL_COUNT: 16
PUTTING_ON_SOCKS_AND_SHOES: NO DIFFICULTY AT ALL
GOING_DOWN_1_FLIGHT_OF_STAIRS: SLIGHT DIFFICULTY
WALKING_UP_STEEP_HILLS: MODERATE DIFFICULTY
HOW_WOULD_YOU_RATE_YOUR_CURRENT_LEVEL_OF_FUNCTION_DURING_YOUR_USUAL_ACTIVITIES_OF_DAILY_LIVING_FROM_0_TO_100_WITH_100_BEING_YOUR_LEVEL_OF_FUNCTION_PRIOR_TO_YOUR_HIP_PROBLEM_AND_0_BEING_THE_INABILITY_TO_PERFORM_ANY_OF_YOUR_USUAL_DAILY_ACTIVITIES?: 75
STEPPING_UP_AND_DOWN_CURBS: NO DIFFICULTY AT ALL
TWISTING/PIVOTING_ON_INVOLVED_LEG: MODERATE DIFFICULTY
WALKING_DOWN_STEEP_HILLS: MODERATE DIFFICULTY
HEAVY_WORK: MODERATE DIFFICULTY

## 2020-07-20 NOTE — LETTER
DEPARTMENT OF HEALTH AND HUMAN SERVICES  CENTERS FOR MEDICARE & MEDICAID SERVICES    PLAN/UPDATED PLAN OF PROGRESS FOR OUTPATIENT REHABILITATION    PATIENTS NAME:  Merline Max   : 1955  PROVIDER NUMBER:    [id number]  [id number]  PROVIDER NAME: KAMILLA ÁLVAREZ [facility]  MEDICAL RECORD NUMBER: [id number]   START OF CARE DATE:  SOC Date: 20   TYPE:  PT  PRIMARY/TREATMENT DIAGNOSIS: (Pertinent Medical Diagnosis)  Trochanteric bursitis of both hips  Bilateral hip pain  VISITS FROM START OF CARE:  Rxs Used: 1     [facility] Initial Evaluation  Subjective:  Patient Health History  Merline Max being seen for B hip pain.   Problem began: 2020.   Problem occurred: unknown   Pain is reported as 2/10 on pain scale.  General health as reported by patient is good.  Pertinent medical history includes: diabetes, high blood pressure and overweight.   Red flags:  None as reported by patient.  Other surgery history details: c-sections, cholecystectomy, hysterectomy.    Current occupation is self employed.    Objective:  Hip:  Posture: slouched sitting and rounded shoulder  Neurological:    Motor Deficit:  Myotomes L R   L1-2 (hip flexion) 4/5 4/5   L3 (knee extension) 5/5 5/5   L4 (ankle DF) 5/5 5/5   L5 (g. toe ext)     S1 (ankle PF or knee flex) 5/5 5/5   B hip abd and ext: 4/5 each bilaterally  Sensory Deficit, Reflexes: normal light touch sensation  Dural Signs:   L R   Slump neg neg   SLR neg neg   Other: soft tissue tightness in B hamstring    AROM: (Major, Moderate, Minimal or Nil loss)  Movement Loss Kevin Mod Min Nil Pain   Flexion   x     Extension   x     Side Gliding L   x     Side Gliding R   x     Repeated movement testing:   (During: produces, abolishes, increases, decreases, no effect, centralizing, peripheralizing; After: better, worse, no better, no worse, no effect, centralized, peripheralized)  Pre-test Symptoms Standing:    Symptoms During Symptoms After ROM increased ROM  decreased No Effect   FIS     x   Rep FIS     x   EIS     x   Rep EIS     x     If required Pre-test Symptoms:    Symptoms During Symptoms After ROM increased ROM decreased No Effect   SGIS - L     x   Rep SGIS - L     x   SGIS - R     x   Rep SGIS - R     x     Static Tests: spring testing negative, Faddir neg, PRASANTH neg with soft tissue tightness in B ERs.  Other Tests: overall, movement and activity seems to help. She has an elliptical and stationary bike at home. Goal is to lose weight.    Assessment/Plan:    Patient is a 64 year old female with both sides hip complaints.    Patient has the following significant findings with corresponding treatment plan.                Diagnosis 1:  B trochanteric bursitis  Pain -  hot/cold therapy, manual therapy, education and home program  Decreased ROM/flexibility - manual therapy, therapeutic exercise, therapeutic activity and home program  Decreased strength - therapeutic exercise, therapeutic activities and home program  Cumulative Therapy Evaluation is: Low complexity.  Previous and current functional limitations:  (See Goal Flow Sheet for this information)    Short term and Long term goals: (See Goal Flow Sheet for this information)   Communication ability:  Patient appears to be able to clearly communicate and understand verbal and written communication and follow directions correctly.  Treatment Explanation - The following has been discussed with the patient:   RX ordered/plan of care  Anticipated outcomes  Possible risks and side effects  This patient would benefit from PT intervention to resume normal activities.   Rehab potential is good.  Frequency:  1 X week, once daily  Duration:  for 12 weeks  Discharge Plan:  Achieve all LTG.  Independent in home treatment program.  Reach maximal therapeutic benefit.  Please refer to the daily flowsheet for treatment today, total treatment time and time spent performing 1:1 timed codes.   PATIENTS NAME:  Merline Max:  "1955        Caregiver Signature/Credentials _____________________________ Date ________      Treating Provider: Willem Tucker DPT   I have reviewed and certified the need for these services and plan of treatment while under my care.        PHYSICIAN'S SIGNATURE:   _________________________________________  Date___________   Tameka Stahl M.D.     Certification period:  Beginning of Cert date period: 07/20/20 to  End of Cert period date: 10/17/20     Functional Level Progress Report: Please see attached \"Goal Flow sheet for Functional level.\"    ____X____ Continue Services or       ________ DC Services                Service dates: From  SOC Date: 07/20/20 date to present                         "

## 2020-07-20 NOTE — PROGRESS NOTES
[facility] Initial Evaluation  Subjective:    Patient Health History  Merline Max being seen for B hip pain.     Problem began: 7/18/2020.   Problem occurred: unknown   Pain is reported as 2/10 on pain scale.  General health as reported by patient is good.  Pertinent medical history includes: diabetes, high blood pressure and overweight.   Red flags:  None as reported by patient.      Other surgery history details: c-sections, cholecystectomy, hysterectomy.        Current occupation is self employed.                                       Objective:      Hip:    Posture: slouched sitting and rounded shoulder    Neurological:    Motor Deficit:  Myotomes L R   L1-2 (hip flexion) 4/5 4/5   L3 (knee extension) 5/5 5/5   L4 (ankle DF) 5/5 5/5   L5 (g. toe ext)     S1 (ankle PF or knee flex) 5/5 5/5   B hip abd and ext: 4/5 each bilaterally    Sensory Deficit, Reflexes: normal light touch sensation    Dural Signs:   L R   Slump neg neg   SLR neg neg   Other: soft tissue tightness in B hamstring    AROM: (Major, Moderate, Minimal or Nil loss)  Movement Loss Kevin Mod Min Nil Pain   Flexion   x     Extension   x     Side Gliding L   x     Side Gliding R   x       Repeated movement testing:   (During: produces, abolishes, increases, decreases, no effect, centralizing, peripheralizing; After: better, worse, no better, no worse, no effect, centralized, peripheralized)    Pre-test Symptoms Standing:    Symptoms During Symptoms After ROM increased ROM decreased No Effect   FIS     x   Rep FIS     x   EIS     x   Rep EIS     x     If required Pre-test Symptoms:    Symptoms During Symptoms After ROM increased ROM decreased No Effect   SGIS - L     x   Rep SGIS - L     x   SGIS - R     x   Rep SGIS - R     x     Static Tests: spring testing negative, Faddir neg, PRASANTH neg with soft tissue tightness in B ERs.  Other Tests: overall, movement and activity seems to help. She has an elliptical and stationary bike at  home. Goal is to lose weight.            System    Physical Exam    General     ROS    Assessment/Plan:    Patient is a 64 year old female with both sides hip complaints.    Patient has the following significant findings with corresponding treatment plan.                Diagnosis 1:  B trochanteric bursitis  Pain -  hot/cold therapy, manual therapy, education and home program  Decreased ROM/flexibility - manual therapy, therapeutic exercise, therapeutic activity and home program  Decreased strength - therapeutic exercise, therapeutic activities and home program    Cumulative Therapy Evaluation is: Low complexity.    Previous and current functional limitations:  (See Goal Flow Sheet for this information)    Short term and Long term goals: (See Goal Flow Sheet for this information)     Communication ability:  Patient appears to be able to clearly communicate and understand verbal and written communication and follow directions correctly.  Treatment Explanation - The following has been discussed with the patient:   RX ordered/plan of care  Anticipated outcomes  Possible risks and side effects  This patient would benefit from PT intervention to resume normal activities.   Rehab potential is good.    Frequency:  1 X week, once daily  Duration:  for 12 weeks  Discharge Plan:  Achieve all LTG.  Independent in home treatment program.  Reach maximal therapeutic benefit.    Please refer to the daily flowsheet for treatment today, total treatment time and time spent performing 1:1 timed codes.

## 2020-07-20 NOTE — LETTER
DEPARTMENT OF HEALTH AND HUMAN SERVICES  CENTERS FOR MEDICARE & MEDICAID SERVICES    PLAN/UPDATED PLAN OF PROGRESS FOR OUTPATIENT REHABILITATION    PATIENTS NAME:  Merline Max   : 1955  PROVIDER NUMBER:    [id number]  [id number]  PROVIDER NAME: KAMILLA ÁLVAREZ [facility]  MEDICAL RECORD NUMBER: [id number]   START OF CARE DATE:  SOC Date: 20   TYPE:  PT  PRIMARY/TREATMENT DIAGNOSIS: (Pertinent Medical Diagnosis)  Trochanteric bursitis of both hips  Bilateral hip pain  VISITS FROM START OF CARE:  Rxs Used: 1     [facility] Initial Evaluation  Subjective:  Patient Health History  Merline Max being seen for B hip pain.   Problem began: 2015.   Problem occurred: unknown   Pain is reported as 2/10 on pain scale.  General health as reported by patient is good.  Pertinent medical history includes: diabetes, high blood pressure and overweight.   Red flags:  None as reported by patient.  Other surgery history details: c-sections, cholecystectomy, hysterectomy.      Current occupation is self employed.     Objective:  Hip:  Posture: slouched sitting and rounded shoulder  Neurological:  Motor Deficit:  Myotomes L R   L1-2 (hip flexion) 4/5 4/5   L3 (knee extension) 5/5 5/5   L4 (ankle DF) 5/5 5/5   L5 (g. toe ext)     S1 (ankle PF or knee flex) 5/5 5/5   B hip abd and ext: 4/5 each bilaterally  Sensory Deficit, Reflexes: normal light touch sensation  Dural Signs:   L R   Slump neg neg   SLR neg neg   Other: soft tissue tightness in B hamstring    AROM: (Major, Moderate, Minimal or Nil loss)  Movement Loss Kevin Mod Min Nil Pain   Flexion   x     Extension   x     Side Gliding L   x     Side Gliding R   x       Repeated movement testing:   (During: produces, abolishes, increases, decreases, no effect, centralizing, peripheralizing; After: better, worse, no better, no worse, no effect, centralized, peripheralized)  Pre-test Symptoms Standing:    Symptoms During Symptoms After ROM increased ROM  decreased No Effect   FIS     x   Rep FIS     x   EIS     x   Rep EIS     x     If required Pre-test Symptoms:    Symptoms During Symptoms After ROM increased ROM decreased No Effect   SGIS - L     x   Rep SGIS - L     x   SGIS - R     x   Rep SGIS - R     x     Static Tests: spring testing negative, Faddir neg, PRASANTH neg with soft tissue tightness in B ERs.  Other Tests: overall, movement and activity seems to help. She has an elliptical and stationary bike at home. Goal is to lose weight.    Assessment/Plan:    Patient is a 64 year old female with both sides hip complaints.    Patient has the following significant findings with corresponding treatment plan.                Diagnosis 1:  B trochanteric bursitis  Pain -  hot/cold therapy, manual therapy, education and home program  Decreased ROM/flexibility - manual therapy, therapeutic exercise, therapeutic activity and home program  Decreased strength - therapeutic exercise, therapeutic activities and home program    Cumulative Therapy Evaluation is: Low complexity.  Previous and current functional limitations:  (See Goal Flow Sheet for this information)    Short term and Long term goals: (See Goal Flow Sheet for this information)   Communication ability:  Patient appears to be able to clearly communicate and understand verbal and written communication and follow directions correctly.  Treatment Explanation - The following has been discussed with the patient:   RX ordered/plan of care  Anticipated outcomes  Possible risks and side effects  This patient would benefit from PT intervention to resume normal activities.   Rehab potential is good.  Frequency:  1 X week, once daily  Duration:  for 12 weeks  Discharge Plan:  Achieve all LTG.  Independent in home treatment program.  Reach maximal therapeutic benefit.  PATIENTS NAME:  Merline Max   : 1955    Please refer to the daily flowsheet for treatment today, total treatment time and time spent performing 1:1  "timed codes.         Caregiver Signature/Credentials _____________________________ Date ________      Treating Provider: Willem Tucker DPYVONNE   I have reviewed and certified the need for these services and plan of treatment while under my care.        PHYSICIAN'S SIGNATURE:   _________________________________________  Date___________   Tameka Stahl M.D.    Certification period:  Beginning of Cert date period: 07/20/20 to  End of Cert period date: 10/17/20     Functional Level Progress Report: Please see attached \"Goal Flow sheet for Functional level.\"    ____X____ Continue Services or       ________ DC Services                Service dates: From  SOC Date: 07/20/20 date to present                         "

## 2020-07-27 ENCOUNTER — THERAPY VISIT (OUTPATIENT)
Dept: PHYSICAL THERAPY | Facility: CLINIC | Age: 65
End: 2020-07-27
Attending: PHYSICAL MEDICINE & REHABILITATION
Payer: MEDICARE

## 2020-07-27 DIAGNOSIS — M25.551 BILATERAL HIP PAIN: Primary | ICD-10-CM

## 2020-07-27 DIAGNOSIS — M25.552 BILATERAL HIP PAIN: Primary | ICD-10-CM

## 2020-07-27 PROCEDURE — 97110 THERAPEUTIC EXERCISES: CPT | Mod: GP | Performed by: PHYSICAL THERAPIST

## 2020-08-10 ENCOUNTER — THERAPY VISIT (OUTPATIENT)
Dept: PHYSICAL THERAPY | Facility: CLINIC | Age: 65
End: 2020-08-10
Payer: MEDICARE

## 2020-08-10 DIAGNOSIS — M25.551 BILATERAL HIP PAIN: Primary | ICD-10-CM

## 2020-08-10 DIAGNOSIS — M25.552 BILATERAL HIP PAIN: Primary | ICD-10-CM

## 2020-08-10 PROCEDURE — 97110 THERAPEUTIC EXERCISES: CPT | Mod: GP | Performed by: PHYSICAL THERAPIST

## 2020-08-24 ENCOUNTER — OFFICE VISIT (OUTPATIENT)
Dept: OPTOMETRY | Facility: CLINIC | Age: 65
End: 2020-08-24
Payer: MEDICARE

## 2020-08-24 DIAGNOSIS — E11.9 TYPE 2 DIABETES MELLITUS WITHOUT COMPLICATION, WITHOUT LONG-TERM CURRENT USE OF INSULIN (H): Primary | ICD-10-CM

## 2020-08-24 DIAGNOSIS — H52.4 PRESBYOPIA: ICD-10-CM

## 2020-08-24 DIAGNOSIS — Z86.69 HISTORY OF IRITIS: ICD-10-CM

## 2020-08-24 DIAGNOSIS — H10.13 ALLERGIC CONJUNCTIVITIS, BILATERAL: ICD-10-CM

## 2020-08-24 DIAGNOSIS — H52.03 HYPEROPIA, BILATERAL: ICD-10-CM

## 2020-08-24 PROCEDURE — 92015 DETERMINE REFRACTIVE STATE: CPT | Mod: GY | Performed by: OPTOMETRIST

## 2020-08-24 PROCEDURE — 92004 COMPRE OPH EXAM NEW PT 1/>: CPT | Performed by: OPTOMETRIST

## 2020-08-24 ASSESSMENT — VISUAL ACUITY
OD_SC: 20/25
OS_SC: 20/200
OS_SC: 20/40
OD_SC: 20/200
METHOD: SNELLEN - LINEAR
OS_SC+: -1

## 2020-08-24 ASSESSMENT — REFRACTION_MANIFEST
METHOD_AUTOREFRACTION: 1
OS_SPHERE: +1.00
OS_CYLINDER: +1.50
OD_CYLINDER: +1.00
OS_ADD: +2.00
OD_ADD: +2.00
OD_CYLINDER: +1.25
OS_CYLINDER: +1.25
OS_SPHERE: +1.00
OD_SPHERE: +0.75
OS_AXIS: 148
OD_AXIS: 002
OD_AXIS: 010
OD_SPHERE: +0.50
OS_AXIS: 150

## 2020-08-24 ASSESSMENT — CUP TO DISC RATIO
OD_RATIO: 0.5
OS_RATIO: 0.5

## 2020-08-24 ASSESSMENT — EXTERNAL EXAM - LEFT EYE: OS_EXAM: NORMAL

## 2020-08-24 ASSESSMENT — SLIT LAMP EXAM - LIDS
COMMENTS: 1+ DERMATOCHALASIS
COMMENTS: 1+ DERMATOCHALASIS

## 2020-08-24 ASSESSMENT — TONOMETRY
OS_IOP_MMHG: 20
OD_IOP_MMHG: 20
IOP_METHOD: APPLANATION

## 2020-08-24 ASSESSMENT — CONF VISUAL FIELD
OS_NORMAL: 1
OD_NORMAL: 1
METHOD: COUNTING FINGERS

## 2020-08-24 ASSESSMENT — EXTERNAL EXAM - RIGHT EYE: OD_EXAM: NORMAL

## 2020-08-24 NOTE — PROGRESS NOTES
Chief Complaint   Patient presents with     Diabetic Eye Exam      diabetes 2 no use of insulin  PCP Dr. Nair   Hemoglobin A1C   Date Value Ref Range Status   07/15/2020 6.0 (H) 0 - 5.6 % Final     Comment:     Normal <5.7% Prediabetes 5.7-6.4%  Diabetes 6.5% or higher - adopted from ADA   consensus guidelines.     01/09/2020 6.3 (H) 0 - 5.6 % Final     Comment:     Normal <5.7% Prediabetes 5.7-6.4%  Diabetes 6.5% or higher - adopted from ADA   consensus guidelines.     12/29/2016 6.1 (H) 4.3 - 6.0 % Final   07/16/2014 5.8 4.3 - 6 % Final         Last Eye Exam: Fall 2018  Dilated Previously: Yes    What are you currently using to see?  Readers +2.00 or +2.25    Distance Vision Acuity: Noticed gradual change in both eyes; more so in her left eye.    Near Vision Acuity: Satisfied with vision while reading  with readers    Eye Comfort: itchy - Fall is a bad allergy season  Do you use eye drops? : Yes: Started doing Zaditor in the last couple of days.  Patient has been occasionally using Systane.  Last time used Zaditor - this morning. Sometimes she will use Zaditor at night too.  Systane last used a few days ago.  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       Medical, surgical and family histories reviewed and updated 8/24/2020.       OBJECTIVE: See Ophthalmology exam    ASSESSMENT:    ICD-10-CM    1. Type 2 diabetes mellitus without complication, without long-term current use of insulin (H)  E11.9 REFRACTION     EYE EXAM (SIMPLE-NONBILLABLE)   2. Presbyopia  H52.4 REFRACTION     EYE EXAM (SIMPLE-NONBILLABLE)   3. Hyperopia, bilateral  H52.03 REFRACTION     EYE EXAM (SIMPLE-NONBILLABLE)   4. Allergic conjunctivitis, bilateral  H10.13 REFRACTION     EYE EXAM (SIMPLE-NONBILLABLE)   5. History of iritis - Both Eyes 2014  Z86.69 REFRACTION     EYE EXAM (SIMPLE-NONBILLABLE)      PLAN:    Merline Max aware  eye exam results will be sent to [facility].  Patient  Instructions   Patient was advised of today's exam findings.  Fill glasses prescription for near glasses   Consider distance only glasses in the future  Keep blood sugar under good control  Return in 1 year for diabetic eye exam      Diabetes weakens the blood vessels all over the body, including the eyes. Damage to the blood vessels in the eyes can cause swelling or bleeding into part of the eye (called the retina). This is called diabetic retinopathy (KHADRA-tin-AH-puh-thee). If not treated, this disease can cause vision loss or blindness.   Symptoms may include blurred or distorted vision, but many people have no symptoms. It's important to see your eye doctor regularly to check for problems.   Early treatment and good control can help protect your vision. Here are the things you can do to help prevent vision loss:      1. Keep your blood sugar levels under tight control.      2. Bring high blood pressure under control.      3. No smoking.      4. Have yearly dilated eye exams.      Inés Araiza O.D.  [facility]   [address] [place], [place] [id number] [phone]

## 2020-08-24 NOTE — LETTER
8/24/2020         RE: Merline Max  [id number]  [facility] [address]        Dear Colleague,    Thank you for referring your patient, Merline Max, to the [facility]. No diabetic retinopathy was found at eye exam.     Again, thank you for allowing me to participate in the care of your patient.        Sincerely,        Inés Araiza, OD

## 2020-08-24 NOTE — PATIENT INSTRUCTIONS
Patient was advised of today's exam findings.  Fill glasses prescription for near glasses   Consider distance only glasses in the future  Keep blood sugar under good control  Return in 1 year for diabetic eye exam      Diabetes weakens the blood vessels all over the body, including the eyes. Damage to the blood vessels in the eyes can cause swelling or bleeding into part of the eye (called the retina). This is called diabetic retinopathy (KHADRA-tin-AH-puh-thee). If not treated, this disease can cause vision loss or blindness.   Symptoms may include blurred or distorted vision, but many people have no symptoms. It's important to see your eye doctor regularly to check for problems.   Early treatment and good control can help protect your vision. Here are the things you can do to help prevent vision loss:      1. Keep your blood sugar levels under tight control.      2. Bring high blood pressure under control.      3. No smoking.      4. Have yearly dilated eye exams.      Inés Araiza O.D.  [facility]   [address] [place], [place] [id number] [phone]

## 2020-10-29 NOTE — PROGRESS NOTES
Pt was seen for 3 PT visits with complete resolution of pain and did not return to clinic for further followup appts. Current status is unknown and plan to discharge PT services.

## 2020-12-06 ENCOUNTER — HEALTH MAINTENANCE LETTER (OUTPATIENT)
Age: 65
End: 2020-12-06

## 2020-12-18 NOTE — PATIENT INSTRUCTIONS
Patient Education   Personalized Prevention Plan  You are due for the preventive services outlined below.  Your care team is available to assist you in scheduling these services.  If you have already completed any of these items, please share that information with your care team to update in your medical record.  Health Maintenance Due   Topic Date Due     Hepatitis B Vaccine (1 of 3 - Risk 3-dose series) 10/21/1973     Zoster (Shingles) Vaccine (1 of 2) 10/21/2004     Eye Exam  11/15/2017     Mammogram  12/19/2018     Annual Wellness Visit  10/21/2019     FALL RISK ASSESSMENT  10/21/2019     Pneumococcal Vaccine (2 of 2 - PPSV23) 03/05/2020     A1C Lab  07/09/2020         61

## 2021-01-15 DIAGNOSIS — E11.9 TYPE 2 DIABETES, HBA1C GOAL < 7% (H): Primary | ICD-10-CM

## 2021-01-15 DIAGNOSIS — N18.30 TYPE 2 DIABETES MELLITUS WITH STAGE 3 CHRONIC KIDNEY DISEASE, WITHOUT LONG-TERM CURRENT USE OF INSULIN (H): ICD-10-CM

## 2021-01-15 DIAGNOSIS — E11.22 TYPE 2 DIABETES MELLITUS WITH STAGE 3 CHRONIC KIDNEY DISEASE, WITHOUT LONG-TERM CURRENT USE OF INSULIN (H): ICD-10-CM

## 2021-01-18 NOTE — TELEPHONE ENCOUNTER
"Requested Prescriptions   Pending Prescriptions Disp Refills    metFORMIN (GLUCOPHAGE) 500 MG tablet [Pharmacy Med Name: METFORMIN  MG TABLET] 360 tablet 1     Sig: TAKE 2 TABLETS BY MOUTH 2 TIMES DAILY WITH MEALS       Biguanide Agents Failed - 1/15/2021 12:21 AM        Failed - Patient has documented A1c within the specified period of time.     If HgbA1C is 8 or greater, it needs to be on file within the past 3 months.  If less than 8, must be on file within the past 6 months.     Recent Labs   Lab Test 07/15/20  1203 07/16/14  0000 07/16/14   A1C 6.0*   < >  --    HEMOGLOBINA1  --   --  5.8    < > = values in this interval not displayed.             Failed - Recent (6 mo) or future (30 days) visit within the authorizing provider's specialty     Patient had office visit in the last 6 months or has a visit in the next 30 days with authorizing provider or within the authorizing provider's specialty.  See \"Patient Info\" tab in inbasket, or \"Choose Columns\" in Meds & Orders section of the refill encounter.            Passed - Patient is age 10 or older        Passed - Patient's CR is NOT>1.4 OR Patient's EGFR is NOT<45 within past 12 mos.     Recent Labs   Lab Test 05/11/20  1202   GFRESTIMATED 54*   GFRESTBLACK 62       Recent Labs   Lab Test 05/11/20  1202   CR 1.08*             Passed - Patient does NOT have a diagnosis of CHF.        Passed - Medication is active on med list        Passed - Patient is not pregnant        Passed - Patient has not had a positive pregnancy test within the past 12 mos.              "

## 2021-02-10 DIAGNOSIS — E11.9 TYPE 2 DIABETES, HBA1C GOAL < 7% (H): ICD-10-CM

## 2021-02-10 NOTE — TELEPHONE ENCOUNTER
Routing refill request to provider for review/approval because:  Labs not current:  A1C    Capri LEVYN, RN

## 2021-02-20 ENCOUNTER — HEALTH MAINTENANCE LETTER (OUTPATIENT)
Age: 66
End: 2021-02-20

## 2021-04-01 DIAGNOSIS — E11.9 TYPE 2 DIABETES, HBA1C GOAL < 7% (H): ICD-10-CM

## 2021-04-28 DIAGNOSIS — E11.9 TYPE 2 DIABETES, HBA1C GOAL < 7% (H): ICD-10-CM

## 2021-04-28 NOTE — TELEPHONE ENCOUNTER
Duplicate request.  Medication was already refilled and sent to pharmacy today, see current med list for details.    Deisy Carter RN  [facility]

## 2021-04-28 NOTE — TELEPHONE ENCOUNTER
"Routing refill request to provider for review/approval because:  Requested Prescriptions   Pending Prescriptions Disp Refills    metFORMIN (GLUCOPHAGE) 500 MG tablet 360 tablet 0     Sig: Take 2 tablets (1,000 mg) by mouth 2 times daily (with meals) Needs to be seen for further refills       Biguanide Agents Failed - 4/28/2021  9:47 AM        Failed - Patient has documented A1c within the specified period of time.     If HgbA1C is 8 or greater, it needs to be on file within the past 3 months.  If less than 8, must be on file within the past 6 months.     Recent Labs   Lab Test 07/15/20  1203 07/16/14  0000 07/16/14   A1C 6.0*   < >  --    HEMOGLOBINA1  --   --  5.8    < > = values in this interval not displayed.             Failed - Recent (6 mo) or future (30 days) visit within the authorizing provider's specialty     Patient had office visit in the last 6 months or has a visit in the next 30 days with authorizing provider or within the authorizing provider's specialty.  See \"Patient Info\" tab in inbasket, or \"Choose Columns\" in Meds & Orders section of the refill encounter.            Passed - Patient is age 10 or older        Passed - Patient's CR is NOT>1.4 OR Patient's EGFR is NOT<45 within past 12 mos.     Recent Labs   Lab Test 05/11/20  1202   GFRESTIMATED 54*   GFRESTBLACK 62       Recent Labs   Lab Test 05/11/20  1202   CR 1.08*             Passed - Patient does NOT have a diagnosis of CHF.        Passed - Medication is active on med list        Passed - Patient is not pregnant        Passed - Patient has not had a positive pregnancy test within the past 12 mos.                Melissa LEVYN, RN          "

## 2021-04-30 DIAGNOSIS — N18.30 CHRONIC KIDNEY DISEASE, STAGE III (MODERATE) (H): Primary | ICD-10-CM

## 2021-05-04 ENCOUNTER — TELEPHONE (OUTPATIENT)
Dept: FAMILY MEDICINE | Facility: CLINIC | Age: 66
End: 2021-05-04

## 2021-05-04 DIAGNOSIS — E11.9 TYPE 2 DIABETES, HBA1C GOAL < 7% (H): Primary | ICD-10-CM

## 2021-05-04 NOTE — TELEPHONE ENCOUNTER
Reason for call:  Other   Patient called regarding (reason for call): order  Additional comments: lab for A1c    Phone number to reach patient:  Cell number on file:    Telephone Information:   Mobile [phone]       Best Time:  any    Can we leave a detailed message on this number?  NO    Travel screening: Not Applicable     Mikayla Allen

## 2021-05-24 DIAGNOSIS — E11.9 TYPE 2 DIABETES, HBA1C GOAL < 7% (H): ICD-10-CM

## 2021-05-24 DIAGNOSIS — N18.30 CHRONIC KIDNEY DISEASE, STAGE III (MODERATE) (H): ICD-10-CM

## 2021-05-24 LAB
ANION GAP SERPL CALCULATED.3IONS-SCNC: 7 MMOL/L (ref 3–14)
BUN SERPL-MCNC: 33 MG/DL (ref 7–30)
CALCIUM SERPL-MCNC: 9.3 MG/DL (ref 8.5–10.1)
CHLORIDE SERPL-SCNC: 108 MMOL/L (ref 94–109)
CHOLEST SERPL-MCNC: 175 MG/DL
CO2 SERPL-SCNC: 24 MMOL/L (ref 20–32)
CREAT SERPL-MCNC: 1.17 MG/DL (ref 0.52–1.04)
CREAT UR-MCNC: 92 MG/DL
GFR SERPL CREATININE-BSD FRML MDRD: 48 ML/MIN/{1.73_M2}
GLUCOSE SERPL-MCNC: 111 MG/DL (ref 70–99)
HBA1C MFR BLD: 5.6 % (ref 0–5.6)
HDLC SERPL-MCNC: 49 MG/DL
LDLC SERPL CALC-MCNC: 76 MG/DL
NONHDLC SERPL-MCNC: 126 MG/DL
POTASSIUM SERPL-SCNC: 3.9 MMOL/L (ref 3.4–5.3)
PROT UR-MCNC: 0.69 G/L
PROT/CREAT 24H UR: 0.75 G/G CR (ref 0–0.2)
SODIUM SERPL-SCNC: 139 MMOL/L (ref 133–144)
TRIGL SERPL-MCNC: 248 MG/DL

## 2021-05-24 PROCEDURE — 80061 LIPID PANEL: CPT | Performed by: FAMILY MEDICINE

## 2021-05-24 PROCEDURE — 36415 COLL VENOUS BLD VENIPUNCTURE: CPT | Performed by: FAMILY MEDICINE

## 2021-05-24 PROCEDURE — 83036 HEMOGLOBIN GLYCOSYLATED A1C: CPT | Performed by: FAMILY MEDICINE

## 2021-05-24 PROCEDURE — 80048 BASIC METABOLIC PNL TOTAL CA: CPT | Performed by: FAMILY MEDICINE

## 2021-05-24 PROCEDURE — 84156 ASSAY OF PROTEIN URINE: CPT | Performed by: INTERNAL MEDICINE

## 2021-06-01 ENCOUNTER — OFFICE VISIT (OUTPATIENT)
Dept: FAMILY MEDICINE | Facility: CLINIC | Age: 66
End: 2021-06-01
Payer: MEDICARE

## 2021-06-01 VITALS
HEART RATE: 89 BPM | HEIGHT: 66 IN | OXYGEN SATURATION: 97 % | WEIGHT: 200 LBS | DIASTOLIC BLOOD PRESSURE: 72 MMHG | BODY MASS INDEX: 32.14 KG/M2 | TEMPERATURE: 98.5 F | SYSTOLIC BLOOD PRESSURE: 150 MMHG | RESPIRATION RATE: 18 BRPM

## 2021-06-01 DIAGNOSIS — N02.B9 IGA NEPHROPATHY: ICD-10-CM

## 2021-06-01 DIAGNOSIS — E11.22 TYPE 2 DIABETES MELLITUS WITH STAGE 3A CHRONIC KIDNEY DISEASE, WITHOUT LONG-TERM CURRENT USE OF INSULIN (H): Primary | ICD-10-CM

## 2021-06-01 DIAGNOSIS — N18.31 STAGE 3A CHRONIC KIDNEY DISEASE (H): ICD-10-CM

## 2021-06-01 DIAGNOSIS — E78.5 HYPERLIPIDEMIA LDL GOAL <100: ICD-10-CM

## 2021-06-01 DIAGNOSIS — I10 ESSENTIAL HYPERTENSION, BENIGN: ICD-10-CM

## 2021-06-01 DIAGNOSIS — G25.81 RESTLESS LEG SYNDROME: ICD-10-CM

## 2021-06-01 DIAGNOSIS — Z86.2 HISTORY OF ANEMIA: ICD-10-CM

## 2021-06-01 DIAGNOSIS — N18.31 TYPE 2 DIABETES MELLITUS WITH STAGE 3A CHRONIC KIDNEY DISEASE, WITHOUT LONG-TERM CURRENT USE OF INSULIN (H): Primary | ICD-10-CM

## 2021-06-01 LAB
ERYTHROCYTE [DISTWIDTH] IN BLOOD BY AUTOMATED COUNT: 14.4 % (ref 10–15)
FERRITIN SERPL-MCNC: 21 NG/ML (ref 8–252)
HCT VFR BLD AUTO: 38.9 % (ref 35–47)
HGB BLD-MCNC: 12.3 G/DL (ref 11.7–15.7)
IRON SATN MFR SERPL: 16 % (ref 15–46)
IRON SERPL-MCNC: 68 UG/DL (ref 35–180)
MCH RBC QN AUTO: 29.9 PG (ref 26.5–33)
MCHC RBC AUTO-ENTMCNC: 31.6 G/DL (ref 31.5–36.5)
MCV RBC AUTO: 94 FL (ref 78–100)
PLATELET # BLD AUTO: 107 10E9/L (ref 150–450)
RBC # BLD AUTO: 4.12 10E12/L (ref 3.8–5.2)
TIBC SERPL-MCNC: 430 UG/DL (ref 240–430)
WBC # BLD AUTO: 3.5 10E9/L (ref 4–11)

## 2021-06-01 PROCEDURE — 99207 PR FOOT EXAM NO CHARGE: CPT | Mod: 25 | Performed by: FAMILY MEDICINE

## 2021-06-01 PROCEDURE — 36415 COLL VENOUS BLD VENIPUNCTURE: CPT | Performed by: FAMILY MEDICINE

## 2021-06-01 PROCEDURE — 99214 OFFICE O/P EST MOD 30 MIN: CPT | Performed by: FAMILY MEDICINE

## 2021-06-01 PROCEDURE — 83540 ASSAY OF IRON: CPT | Performed by: FAMILY MEDICINE

## 2021-06-01 PROCEDURE — 85027 COMPLETE CBC AUTOMATED: CPT | Performed by: FAMILY MEDICINE

## 2021-06-01 PROCEDURE — 83550 IRON BINDING TEST: CPT | Performed by: FAMILY MEDICINE

## 2021-06-01 PROCEDURE — 82728 ASSAY OF FERRITIN: CPT | Performed by: FAMILY MEDICINE

## 2021-06-01 ASSESSMENT — MIFFLIN-ST. JEOR: SCORE: 1463.94

## 2021-06-01 NOTE — PROGRESS NOTES
"    Assessment & Plan     Type 2 diabetes mellitus with stage 3a chronic kidney disease, without long-term current use of insulin (H)  Diabetes well controlled on current meds. May need to lower metformin dose soon due to gfr.  - FOOT EXAM  - metFORMIN (GLUCOPHAGE) 500 MG tablet; Take 2 tablets (1,000 mg) by mouth 2 times daily (with meals) Needs to be seen for further refills    Essential hypertension, benign  Elevated today, managed by nephrology    Stage 3a chronic kidney disease  Per nephrology    IgA nephropathy  same    Hyperlipidemia LDL goal <100  On statin    Restless leg syndrome  Check labs  - CBC with platelets  - Iron and iron binding capacity  - Ferritin    History of anemia  Check labs  - CBC with platelets  - Iron and iron binding capacity  - Ferritin             BMI:   Estimated body mass index is 32.28 kg/m  as calculated from the following:    Height as of this encounter: 1.676 m (5' 6\").    Weight as of this encounter: 90.7 kg (200 lb).   Weight management plan: Discussed healthy diet and exercise guidelines        No follow-ups on file.    Tamika Brown MD  [facility]    Haily Rick is a 65 year old who presents for the following health issues     HPI     Diabetes Follow-up      How often are you checking your blood sugar? Not at all    What concerns do you have today about your diabetes? None     Do you have any of these symptoms? (Select all that apply)  Numbness in feet and Burning in feet        Hyperlipidemia Follow-Up      Are you regularly taking any medication or supplement to lower your cholesterol?   Yes- simvastatin    Are you having muscle aches or other side effects that you think could be caused by your cholesterol lowering medication?  Yes- legs muscles spasm    Hypertension Follow-up      Do you check your blood pressure regularly outside of the clinic? Yes     Are you following a low salt diet? Yes    Are your blood pressures ever more than " "140 on the top number (systolic) OR more   than 90 on the bottom number (diastolic), for example 140/90? No    BP Readings from Last 2 Encounters:   06/01/21 (!) 150/72   07/18/20 121/80     Hemoglobin A1C (%)   Date Value   05/24/2021 5.6   07/15/2020 6.0 (H)     LDL Cholesterol Calculated (mg/dL)   Date Value   05/24/2021 76   01/09/2020 86       Pt with diabetes well controlled on metformin  Discussed gfr is at 48, below 45 will need to decrease dose of metformin and add other agent  Pt is aware  She is on aspriin, arb, statin  She is utd with eye exam      Pt with HTN on avapro and hydrochlorothiazide. A bit high today  Pt to monitor at home and follow-up if not at goal  Pt does see nephrology due to elevated proteinuria from IGA nephropathy.   She will follow-up with them when due    Pt with elevated cholesterol on statin.     Pt with symptoms of restless leg of feeling like her egs are jittery at night  Her mother had this as well  Does not want to go on medicine at thie time.   Will check iron levels as she has h/o anemia        Review of Systems   Constitutional, HEENT, cardiovascular, pulmonary, gi and gu systems are negative, except as otherwise noted.      Objective    BP (!) 150/72   Pulse 89   Temp 98.5  F (36.9  C) (Oral)   Resp 18   Ht 1.676 m (5' 6\")   Wt 90.7 kg (200 lb)   SpO2 97%   BMI 32.28 kg/m    Body mass index is 32.28 kg/m .  Physical Exam   GENERAL: healthy, alert and no distress  NECK: no adenopathy, no asymmetry, masses, or scars and thyroid normal to palpation  RESP: lungs clear to auscultation - no rales, rhonchi or wheezes  CV: regular rate and rhythm, normal S1 S2, no S3 or S4, no murmur, click or rub, no peripheral edema and peripheral pulses strong  ABDOMEN: soft, nontender, no hepatosplenomegaly, no masses and bowel sounds normal  MS: no gross musculoskeletal defects noted, no edema  Diabetic foot exam: normal DP and PT pulses, no trophic changes or ulcerative lesions and " normal sensory exam    Results for orders placed or performed in visit on 06/01/21 (from the past 24 hour(s))   CBC with platelets   Result Value Ref Range    WBC 3.5 (L) 4.0 - 11.0 10e9/L    RBC Count 4.12 3.8 - 5.2 10e12/L    Hemoglobin 12.3 11.7 - 15.7 g/dL    Hematocrit 38.9 35.0 - 47.0 %    MCV 94 78 - 100 fl    MCH 29.9 26.5 - 33.0 pg    MCHC 31.6 31.5 - 36.5 g/dL    RDW 14.4 10.0 - 15.0 %    Platelet Count 107 (L) 150 - 450 10e9/L

## 2021-06-10 ENCOUNTER — TRANSFERRED RECORDS (OUTPATIENT)
Dept: HEALTH INFORMATION MANAGEMENT | Facility: CLINIC | Age: 66
End: 2021-06-10

## 2021-09-09 DIAGNOSIS — Z12.31 VISIT FOR SCREENING MAMMOGRAM: ICD-10-CM

## 2021-09-09 PROCEDURE — 77063 BREAST TOMOSYNTHESIS BI: CPT | Mod: TC | Performed by: RADIOLOGY

## 2021-09-09 PROCEDURE — 77067 SCR MAMMO BI INCL CAD: CPT | Mod: TC | Performed by: RADIOLOGY

## 2021-09-26 ENCOUNTER — HEALTH MAINTENANCE LETTER (OUTPATIENT)
Age: 66
End: 2021-09-26

## 2021-10-05 ENCOUNTER — OFFICE VISIT (OUTPATIENT)
Dept: OPTOMETRY | Facility: CLINIC | Age: 66
End: 2021-10-05
Payer: MEDICARE

## 2021-10-05 DIAGNOSIS — H52.223 REGULAR ASTIGMATISM OF BOTH EYES: ICD-10-CM

## 2021-10-05 DIAGNOSIS — H52.4 PRESBYOPIA: ICD-10-CM

## 2021-10-05 DIAGNOSIS — E11.9 TYPE 2 DIABETES MELLITUS WITHOUT COMPLICATION, WITHOUT LONG-TERM CURRENT USE OF INSULIN (H): Primary | ICD-10-CM

## 2021-10-05 DIAGNOSIS — H52.03 HYPEROPIA, BILATERAL: ICD-10-CM

## 2021-10-05 PROCEDURE — 92015 DETERMINE REFRACTIVE STATE: CPT | Mod: GY | Performed by: OPTOMETRIST

## 2021-10-05 PROCEDURE — 92014 COMPRE OPH EXAM EST PT 1/>: CPT | Performed by: OPTOMETRIST

## 2021-10-05 ASSESSMENT — VISUAL ACUITY
OS_CC+: -2
OS_SC: 20/70
CORRECTION_TYPE: GLASSES
METHOD: SNELLEN - LINEAR
OD_CC: 20/20
OD_SC: 20/70-1
OS_CC: 20/25

## 2021-10-05 ASSESSMENT — TONOMETRY
OS_IOP_MMHG: 17
IOP_METHOD: APPLANATION
OD_IOP_MMHG: 17

## 2021-10-05 ASSESSMENT — REFRACTION_MANIFEST
OS_SPHERE: +0.75
OD_SPHERE: +0.50
OD_CYLINDER: +1.00
OS_AXIS: 155
OD_SPHERE: +0.25
OS_AXIS: 136
OD_CYLINDER: +1.25
OS_CYLINDER: +1.50
OS_CYLINDER: +1.25
METHOD_AUTOREFRACTION: 1
OS_ADD: +2.50
OD_AXIS: 005
OD_AXIS: 020
OD_ADD: +2.50
OS_SPHERE: +0.75

## 2021-10-05 ASSESSMENT — SLIT LAMP EXAM - LIDS
COMMENTS: 1+ DERMATOCHALASIS
COMMENTS: 1+ DERMATOCHALASIS

## 2021-10-05 ASSESSMENT — PACHYMETRY
OS_CT(UM): 671
OD_CT(UM): 668

## 2021-10-05 ASSESSMENT — REFRACTION_WEARINGRX
OS_SPHERE: +2.50
OD_AXIS: 010
OD_SPHERE: +0.50
OS_CYLINDER: +1.00
OD_CYLINDER: +1.00
OS_AXIS: 150
SPECS_TYPE: SVL READING
SPECS_TYPE: SVL DISTANCE
OS_AXIS: 150
OD_CYLINDER: +1.00
OS_CYLINDER: +1.00
OD_SPHERE: +2.00
OD_AXIS: 010
OS_SPHERE: +1.00

## 2021-10-05 ASSESSMENT — CUP TO DISC RATIO
OS_RATIO: 0.5
OD_RATIO: 0.5

## 2021-10-05 ASSESSMENT — KERATOMETRY
OS_K2POWER_DIOPTERS: 43.50
OD_K1POWER_DIOPTERS: 43.00
OS_K1POWER_DIOPTERS: 42.50
OS_AXISANGLE2_DEGREES: 27
OD_K2POWER_DIOPTERS: 43.75
OD_AXISANGLE2_DEGREES: 154

## 2021-10-05 ASSESSMENT — CONF VISUAL FIELD
OD_NORMAL: 1
METHOD: COUNTING FINGERS
OS_NORMAL: 1

## 2021-10-05 ASSESSMENT — EXTERNAL EXAM - LEFT EYE: OS_EXAM: NORMAL

## 2021-10-05 ASSESSMENT — EXTERNAL EXAM - RIGHT EYE: OD_EXAM: NORMAL

## 2021-10-05 NOTE — LETTER
10/5/2021         RE: Merline Max  [id number]  [facility] [address]        Dear Colleague,    Thank you for referring your patient, Merline Max, to the [facility]. No diabetic retinopathy was found at eye exam. Please see a copy of my visit note below.    Chief Complaint   Patient presents with     Diabetic Eye Exam        Chief Complaint(s) and History of Present Illness(es)     Diabetic Eye Exam     Vision: is stable    Diabetes Type: Type 2 and taking oral medications    Blood Sugars: is controlled               Hemoglobin A1C   Date Value Ref Range Status   05/24/2021 5.6 0 - 5.6 % Final     Comment:     Normal <5.7% Prediabetes 5.7-6.4%  Diabetes 6.5% or higher - adopted from ADA   consensus guidelines.     07/15/2020 6.0 (H) 0 - 5.6 % Final     Comment:     Normal <5.7% Prediabetes 5.7-6.4%  Diabetes 6.5% or higher - adopted from ADA   consensus guidelines.     01/09/2020 6.3 (H) 0 - 5.6 % Final     Comment:     Normal <5.7% Prediabetes 5.7-6.4%  Diabetes 6.5% or higher - adopted from ADA   consensus guidelines.     07/16/2014 5.8 4.3 - 6 % Final            Last Eye Exam: 8/24/20  Dilated Previously: Yes    What are you currently using to see?  Glasses, has separate pairs for reading and distance, also uses OTC Readers      Distance Vision Acuity: Satisfied with vision, uses the glasses as needed to drive, mostly uses them at night     Near Vision Acuity: Satisfied with vision while reading and using computer with readers and with glasses. Has +2.25 readers     Eye Comfort: itchy   Do you use eye drops? : Yes: Uses Zatidor as needed   Occupation or Hobbies: Works in Real Estate Office     [employer]      Medical, surgical and family histories reviewed and updated 10/5/2021.       OBJECTIVE: See Ophthalmology exam    ASSESSMENT:    ICD-10-CM    1. Type 2 diabetes mellitus without complication, without long-term current use of insulin (H)  E11.9 EYE EXAM  (SIMPLE-NONBILLABLE)     REFRACTION   2. Hyperopia, bilateral  H52.03 EYE EXAM (SIMPLE-NONBILLABLE)     REFRACTION   3. Regular astigmatism of both eyes  H52.223 EYE EXAM (SIMPLE-NONBILLABLE)     REFRACTION   4. Presbyopia  H52.4 EYE EXAM (SIMPLE-NONBILLABLE)     REFRACTION      PLAN:    Merline Max aware  eye exam results will be sent to Clinic, [facility].  Patient Instructions   Optional to fill new glasses prescription, minimal change  Keep blood sugar under good control  Return in 1 year for diabetic eye exam      Blood sugar and blood pressure control are very important in the prevention of ocular complications from diabetes.   Inés Araiza, OD  [phone]                         Again, thank you for allowing me to participate in the care of your patient.        Sincerely,        Inés Araiza, OD

## 2021-10-05 NOTE — PROGRESS NOTES
Chief Complaint   Patient presents with     Diabetic Eye Exam        Chief Complaint(s) and History of Present Illness(es)     Diabetic Eye Exam     Vision: is stable    Diabetes Type: Type 2 and taking oral medications    Blood Sugars: is controlled               Hemoglobin A1C   Date Value Ref Range Status   05/24/2021 5.6 0 - 5.6 % Final     Comment:     Normal <5.7% Prediabetes 5.7-6.4%  Diabetes 6.5% or higher - adopted from ADA   consensus guidelines.     07/15/2020 6.0 (H) 0 - 5.6 % Final     Comment:     Normal <5.7% Prediabetes 5.7-6.4%  Diabetes 6.5% or higher - adopted from ADA   consensus guidelines.     01/09/2020 6.3 (H) 0 - 5.6 % Final     Comment:     Normal <5.7% Prediabetes 5.7-6.4%  Diabetes 6.5% or higher - adopted from ADA   consensus guidelines.     07/16/2014 5.8 4.3 - 6 % Final            Last Eye Exam: 8/24/20  Dilated Previously: Yes    What are you currently using to see?  Glasses, has separate pairs for reading and distance, also uses OTC Readers      Distance Vision Acuity: Satisfied with vision, uses the glasses as needed to drive, mostly uses them at night     Near Vision Acuity: Satisfied with vision while reading and using computer with readers and with glasses. Has +2.25 readers     Eye Comfort: itchy   Do you use eye drops? : Yes: Uses Zatidor as needed   Occupation or Hobbies: Works in Real Estate Office     [employer]      Medical, surgical and family histories reviewed and updated 10/5/2021.       OBJECTIVE: See Ophthalmology exam    ASSESSMENT:    ICD-10-CM    1. Type 2 diabetes mellitus without complication, without long-term current use of insulin (H)  E11.9 EYE EXAM (SIMPLE-NONBILLABLE)     REFRACTION   2. Hyperopia, bilateral  H52.03 EYE EXAM (SIMPLE-NONBILLABLE)     REFRACTION   3. Regular astigmatism of both eyes  H52.223 EYE EXAM (SIMPLE-NONBILLABLE)     REFRACTION   4. Presbyopia  H52.4 EYE EXAM (SIMPLE-NONBILLABLE)     REFRACTION      PLAN:    Merline  CLEOPATRA Max aware  eye exam results will be sent to Clinic, [facility].  Patient Instructions   Optional to fill new glasses prescription, minimal change  Keep blood sugar under good control  Return in 1 year for diabetic eye exam      Blood sugar and blood pressure control are very important in the prevention of ocular complications from diabetes.   Inés Araiza, OD  [phone]

## 2021-10-05 NOTE — PATIENT INSTRUCTIONS
Optional to fill new glasses prescription, minimal change  Keep blood sugar under good control  Return in 1 year for diabetic eye exam      Blood sugar and blood pressure control are very important in the prevention of ocular complications from diabetes.   Inés Araiza, OD  [phone]

## 2021-11-30 DIAGNOSIS — N18.31 TYPE 2 DIABETES MELLITUS WITH STAGE 3A CHRONIC KIDNEY DISEASE, WITHOUT LONG-TERM CURRENT USE OF INSULIN (H): ICD-10-CM

## 2021-11-30 DIAGNOSIS — E11.22 TYPE 2 DIABETES MELLITUS WITH STAGE 3A CHRONIC KIDNEY DISEASE, WITHOUT LONG-TERM CURRENT USE OF INSULIN (H): ICD-10-CM

## 2021-12-01 NOTE — TELEPHONE ENCOUNTER
"Requested Prescriptions   Pending Prescriptions Disp Refills    metFORMIN (GLUCOPHAGE) 500 MG tablet [Pharmacy Med Name: METFORMIN  MG TABLET] 360 tablet 1     Sig: Take 2 tablets (1,000 mg) by mouth 2 times daily (with meals) Needs to be seen for further refills        Biguanide Agents Failed - 11/30/2021  9:08 AM        Failed - Patient has documented A1c within the specified period of time.     If HgbA1C is 8 or greater, it needs to be on file within the past 3 months.  If less than 8, must be on file within the past 6 months.     Recent Labs   Lab Test 05/24/21  1130 06/15/16  1142 07/16/14  0000   A1C 5.6   < >  --    HEMOGLOBINA1  --   --  5.8    < > = values in this interval not displayed.             Failed - Recent (6 mo) or future (30 days) visit within the authorizing provider's specialty     Patient had office visit in the last 6 months or has a visit in the next 30 days with authorizing provider or within the authorizing provider's specialty.  See \"Patient Info\" tab in inbasket, or \"Choose Columns\" in Meds & Orders section of the refill encounter.            Passed - Patient is age 10 or older        Passed - Patient's CR is NOT>1.4 OR Patient's EGFR is NOT<45 within past 12 mos.       Recent Labs   Lab Test 05/24/21  1130   GFRESTIMATED 48*   GFRESTBLACK 56*       Recent Labs   Lab Test 05/24/21  1130   CR 1.17*             Passed - Patient does NOT have a diagnosis of CHF.        Passed - Medication is active on med list        Passed - Patient is not pregnant        Passed - Patient has not had a positive pregnancy test within the past 12 mos.               "

## 2021-12-25 DIAGNOSIS — E11.22 TYPE 2 DIABETES MELLITUS WITH STAGE 3A CHRONIC KIDNEY DISEASE, WITHOUT LONG-TERM CURRENT USE OF INSULIN (H): ICD-10-CM

## 2021-12-25 DIAGNOSIS — N18.31 TYPE 2 DIABETES MELLITUS WITH STAGE 3A CHRONIC KIDNEY DISEASE, WITHOUT LONG-TERM CURRENT USE OF INSULIN (H): ICD-10-CM

## 2022-01-16 ENCOUNTER — HEALTH MAINTENANCE LETTER (OUTPATIENT)
Age: 67
End: 2022-01-16

## 2022-01-21 DIAGNOSIS — N18.31 TYPE 2 DIABETES MELLITUS WITH STAGE 3A CHRONIC KIDNEY DISEASE, WITHOUT LONG-TERM CURRENT USE OF INSULIN (H): ICD-10-CM

## 2022-01-21 DIAGNOSIS — E11.22 TYPE 2 DIABETES MELLITUS WITH STAGE 3A CHRONIC KIDNEY DISEASE, WITHOUT LONG-TERM CURRENT USE OF INSULIN (H): ICD-10-CM

## 2022-01-21 NOTE — TELEPHONE ENCOUNTER
"Requested Prescriptions   Pending Prescriptions Disp Refills    metFORMIN (GLUCOPHAGE) 500 MG tablet [Pharmacy Med Name: METFORMIN  MG TABLET] 120 tablet 0     Sig: TAKE 2 TABLETS (1,000 MG) BY MOUTH 2 TIMES DAILY (WITH MEALS) NEEDS TO BE SEEN FOR FURTHER REFILLS        Biguanide Agents Failed - 1/21/2022 12:34 PM        Failed - Patient has documented A1c within the specified period of time.     If HgbA1C is 8 or greater, it needs to be on file within the past 3 months.  If less than 8, must be on file within the past 6 months.     Recent Labs   Lab Test 05/24/21  1130 06/15/16  1142 07/16/14  0000   A1C 5.6   < >  --    HEMOGLOBINA1  --   --  5.8    < > = values in this interval not displayed.             Failed - Recent (6 mo) or future (30 days) visit within the authorizing provider's specialty     Patient had office visit in the last 6 months or has a visit in the next 30 days with authorizing provider or within the authorizing provider's specialty.  See \"Patient Info\" tab in inbasket, or \"Choose Columns\" in Meds & Orders section of the refill encounter.            Passed - Patient is age 10 or older        Passed - Patient's CR is NOT>1.4 OR Patient's EGFR is NOT<45 within past 12 mos.       Recent Labs   Lab Test 05/24/21  1130   GFRESTIMATED 48*   GFRESTBLACK 56*       Recent Labs   Lab Test 05/24/21  1130   CR 1.17*             Passed - Patient does NOT have a diagnosis of CHF.        Passed - Medication is active on med list        Passed - Patient is not pregnant        Passed - Patient has not had a positive pregnancy test within the past 12 mos.               "

## 2022-02-07 ENCOUNTER — TELEPHONE (OUTPATIENT)
Dept: LAB | Facility: CLINIC | Age: 67
End: 2022-02-07
Payer: COMMERCIAL

## 2022-02-07 DIAGNOSIS — N18.31 TYPE 2 DIABETES MELLITUS WITH STAGE 3A CHRONIC KIDNEY DISEASE, WITHOUT LONG-TERM CURRENT USE OF INSULIN (H): Primary | ICD-10-CM

## 2022-02-07 DIAGNOSIS — E11.22 TYPE 2 DIABETES MELLITUS WITH STAGE 3A CHRONIC KIDNEY DISEASE, WITHOUT LONG-TERM CURRENT USE OF INSULIN (H): Primary | ICD-10-CM

## 2022-02-09 ENCOUNTER — LAB (OUTPATIENT)
Dept: LAB | Facility: CLINIC | Age: 67
End: 2022-02-09
Payer: COMMERCIAL

## 2022-02-09 DIAGNOSIS — N18.31 TYPE 2 DIABETES MELLITUS WITH STAGE 3A CHRONIC KIDNEY DISEASE, WITHOUT LONG-TERM CURRENT USE OF INSULIN (H): ICD-10-CM

## 2022-02-09 DIAGNOSIS — E11.22 TYPE 2 DIABETES MELLITUS WITH STAGE 3A CHRONIC KIDNEY DISEASE, WITHOUT LONG-TERM CURRENT USE OF INSULIN (H): ICD-10-CM

## 2022-02-09 DIAGNOSIS — N18.30 CHRONIC KIDNEY DISEASE, STAGE III (MODERATE) (H): ICD-10-CM

## 2022-02-09 LAB
ANION GAP SERPL CALCULATED.3IONS-SCNC: 4 MMOL/L (ref 3–14)
BUN SERPL-MCNC: 43 MG/DL (ref 7–30)
CALCIUM SERPL-MCNC: 9.4 MG/DL (ref 8.5–10.1)
CHLORIDE BLD-SCNC: 110 MMOL/L (ref 94–109)
CHOLEST SERPL-MCNC: 170 MG/DL
CO2 SERPL-SCNC: 26 MMOL/L (ref 20–32)
CREAT SERPL-MCNC: 1.26 MG/DL (ref 0.52–1.04)
CREAT UR-MCNC: 80 MG/DL
FASTING STATUS PATIENT QL REPORTED: YES
GFR SERPL CREATININE-BSD FRML MDRD: 47 ML/MIN/1.73M2
GLUCOSE BLD-MCNC: 132 MG/DL (ref 70–99)
HBA1C MFR BLD: 5.8 % (ref 0–5.6)
HDLC SERPL-MCNC: 48 MG/DL
LDLC SERPL CALC-MCNC: 78 MG/DL
MICROALBUMIN UR-MCNC: 1220 MG/L
MICROALBUMIN/CREAT UR: 1525 MG/G CR (ref 0–25)
NONHDLC SERPL-MCNC: 122 MG/DL
POTASSIUM BLD-SCNC: 4 MMOL/L (ref 3.4–5.3)
SODIUM SERPL-SCNC: 140 MMOL/L (ref 133–144)
TRIGL SERPL-MCNC: 220 MG/DL

## 2022-02-09 PROCEDURE — 83036 HEMOGLOBIN GLYCOSYLATED A1C: CPT

## 2022-02-09 PROCEDURE — 36415 COLL VENOUS BLD VENIPUNCTURE: CPT

## 2022-02-09 PROCEDURE — 80061 LIPID PANEL: CPT

## 2022-02-09 PROCEDURE — 80048 BASIC METABOLIC PNL TOTAL CA: CPT

## 2022-02-09 PROCEDURE — 82043 UR ALBUMIN QUANTITATIVE: CPT

## 2022-02-15 DIAGNOSIS — E11.22 TYPE 2 DIABETES MELLITUS WITH STAGE 3A CHRONIC KIDNEY DISEASE, WITHOUT LONG-TERM CURRENT USE OF INSULIN (H): ICD-10-CM

## 2022-02-15 DIAGNOSIS — N18.31 TYPE 2 DIABETES MELLITUS WITH STAGE 3A CHRONIC KIDNEY DISEASE, WITHOUT LONG-TERM CURRENT USE OF INSULIN (H): ICD-10-CM

## 2022-03-04 ENCOUNTER — TELEPHONE (OUTPATIENT)
Dept: FAMILY MEDICINE | Facility: CLINIC | Age: 67
End: 2022-03-04
Payer: COMMERCIAL

## 2022-03-04 NOTE — TELEPHONE ENCOUNTER
Patient is calling. She said that she had a message from Taya to call back. I do not see any message that she called. Please advise.Dione Henson MA/NICKY

## 2022-03-07 NOTE — TELEPHONE ENCOUNTER
Talked to patient and was able to change appointment to in person verses telephone per provider request

## 2022-03-08 ENCOUNTER — OFFICE VISIT (OUTPATIENT)
Dept: FAMILY MEDICINE | Facility: CLINIC | Age: 67
End: 2022-03-08
Payer: COMMERCIAL

## 2022-03-08 VITALS
WEIGHT: 192 LBS | DIASTOLIC BLOOD PRESSURE: 82 MMHG | HEART RATE: 98 BPM | TEMPERATURE: 97.7 F | OXYGEN SATURATION: 99 % | BODY MASS INDEX: 30.99 KG/M2 | SYSTOLIC BLOOD PRESSURE: 137 MMHG

## 2022-03-08 DIAGNOSIS — Z00.00 ENCOUNTER FOR MEDICARE ANNUAL WELLNESS EXAM: Primary | ICD-10-CM

## 2022-03-08 DIAGNOSIS — N18.31 STAGE 3A CHRONIC KIDNEY DISEASE (H): ICD-10-CM

## 2022-03-08 DIAGNOSIS — E11.22 TYPE 2 DIABETES MELLITUS WITH STAGE 3A CHRONIC KIDNEY DISEASE, WITHOUT LONG-TERM CURRENT USE OF INSULIN (H): ICD-10-CM

## 2022-03-08 DIAGNOSIS — I10 ESSENTIAL HYPERTENSION, BENIGN: ICD-10-CM

## 2022-03-08 DIAGNOSIS — N18.31 TYPE 2 DIABETES MELLITUS WITH STAGE 3A CHRONIC KIDNEY DISEASE, WITHOUT LONG-TERM CURRENT USE OF INSULIN (H): ICD-10-CM

## 2022-03-08 DIAGNOSIS — E78.5 HYPERLIPIDEMIA LDL GOAL <100: ICD-10-CM

## 2022-03-08 PROCEDURE — G0438 PPPS, INITIAL VISIT: HCPCS | Performed by: FAMILY MEDICINE

## 2022-03-08 PROCEDURE — 99214 OFFICE O/P EST MOD 30 MIN: CPT | Mod: 25 | Performed by: FAMILY MEDICINE

## 2022-03-08 NOTE — PATIENT INSTRUCTIONS
Patient Education   Personalized Prevention Plan  You are due for the preventive services outlined below.  Your care team is available to assist you in scheduling these services.  If you have already completed any of these items, please share that information with your care team to update in your medical record.  Health Maintenance Due   Topic Date Due     ANNUAL REVIEW OF HM ORDERS  Never done     Zoster (Shingles) Vaccine (2 of 2) 12/11/2020     FALL RISK ASSESSMENT  07/15/2021     Flu Vaccine (1) 09/01/2021     Hemoglobin  06/01/2022

## 2022-03-08 NOTE — PROGRESS NOTES
{PROVIDER CHARTING PREFERENCE:003956}    Haily Rick is a 66 year old who presents for the following health issues {ACCOMPANIED BY STATEMENT (Optional):688575}    HPI     {SUPERLIST (Optional):691863}  {additonal problems for provider to add (Optional):726398}    Review of Systems   {ROS COMP (Optional):145621}      Objective    There were no vitals taken for this visit.  There is no height or weight on file to calculate BMI.  Physical Exam   {Exam List (Optional):615997}    {Diagnostic Test Results (Optional):385532}    {AMBULATORY ATTESTATION (Optional):892200}

## 2022-03-08 NOTE — PROGRESS NOTES
"  Assessment & Plan       Type 2 diabetes mellitus with stage 3a chronic kidney disease, without long-term current use of insulin (H)  Very well controlled on oral meds, okay for metformin yet based on gfr  - metFORMIN (GLUCOPHAGE) 500 MG tablet; TAKE 2 TABLETS (1,000 MG) BY MOUTH 2 TIMES DAILY WITH MEALS    Hyperlipidemia LDL goal <100  LDL at goal    Stage 3a chronic kidney disease (H)  Sees nephrology due to proteinuria    Essential hypertension, benign  At goal on meds               BMI:   Estimated body mass index is 30.99 kg/m  as calculated from the following:    Height as of 6/1/21: 1.676 m (5' 6\").    Weight as of this encounter: 87.1 kg (192 lb).   Weight management plan: Discussed healthy diet and exercise guidelines        Return in about 53 weeks (around 3/14/2023) for Annual Wellness Visit.    Tamika Brown MD  [facility] DESIREE Rick is a 66 year old who presents for the following health issues     History of Present Illness       Diabetes:   She presents for follow up of diabetes.  She is checking home blood glucose a few times a month. She checks blood glucose before meals.  Blood glucose is never over 200 and never under 70. When her blood glucose is low, the patient is asymptomatic for confusion, blurred vision, lethargy and reports not feeling dizzy, shaky, or weak.  She has no concerns regarding her diabetes at this time.  She is having numbness in feet.         She eats 2-3 servings of fruits and vegetables daily.She consumes 0 sweetened beverage(s) daily.She exercises with enough effort to increase her heart rate 20 to 29 minutes per day.  She exercises with enough effort to increase her heart rate 3 or less days per week.   She is taking medications regularly.     Pt with diabetes well controlled on meds  Is on statin, aspirin and arb  LDL at goal  , BP at goal  Is seeing nephrology, dr Morgan            Review of Systems   Constitutional, HEENT, " cardiovascular, pulmonary, gi and gu systems are negative, except as otherwise noted.      Objective    /82   Pulse 98   Temp 97.7  F (36.5  C)   Wt 87.1 kg (192 lb)   SpO2 99%   BMI 30.99 kg/m    Body mass index is 30.99 kg/m .  Physical Exam   GENERAL: healthy, alert and no distress  EYES: Eyes grossly normal to inspection, PERRL and conjunctivae and sclerae normal  HENT: ear canals and TM's normal, nose and mouth without ulcers or lesions  NECK: no adenopathy, no asymmetry, masses, or scars and thyroid normal to palpation  RESP: lungs clear to auscultation - no rales, rhonchi or wheezes  CV: regular rate and rhythm, normal S1 S2, no S3 or S4, no murmur, click or rub, no peripheral edema and peripheral pulses strong  ABDOMEN: soft, nontender, no hepatosplenomegaly, no masses and bowel sounds normal  MS: no gross musculoskeletal defects noted, no edema  SKIN: no suspicious lesions or rashes  NEURO: Normal strength and tone, mentation intact and speech normal  PSYCH: mentation appears normal, affect normal/bright    No results found for this or any previous visit (from the past 24 hour(s)).

## 2022-03-08 NOTE — PROGRESS NOTES
"  SUBJECTIVE:   Merline Max is a 66 year old female who presents for Preventive Visit.        Are you in the first 12 months of your Medicare Part B coverage?  No    Physical Health:    In general, how would you rate your overall physical health? excellent    Outside of work, how many days during the week do you exercise? 4-5 days/week    Outside of work, approximately how many minutes a day do you exercise?45-60 minutes    If you drink alcohol do you typically have >3 drinks per day or >7 drinks per week? No    Do you usually eat at least 4 servings of fruit and vegetables a day, include whole grains & fiber and avoid regularly eating high fat or \"junk\" foods? Yes    Do you have any problems taking medications regularly?  No    Do you have any side effects from medications? none    Needs assistance for the following daily activities: no assistance needed    Which of the following safety concerns are present in your home?  none identified     Hearing impairment: No    In the past 6 months, have you been bothered by leaking of urine? no    Mental Health:    In general, how would you rate your overall mental or emotional health? excellent  PHQ-2 Score: (P) 0    Do you feel safe in your environment? Yes    Have you ever done Advance Care Planning? (For example, a Health Directive, POLST, or a discussion with a medical provider or your loved ones about your wishes): No, advance care planning information given to patient to review.  Patient declined advance care planning discussion at this time.    Additional concerns to address?      Fall risk:       Cognitive Screenin) Repeat 3 items (Leader, Season, Table)    2) Clock draw: NORMAL  3) 3 item recall: Recalls 3 objects  Results: 3 items recalled: COGNITIVE IMPAIRMENT LESS LIKELY    Mini-CogTM Copyright JAVIER Naqvi. Licensed by the author for use in [facility]; reprinted with permission (karolina@.[facility]). All rights reserved.      Do you have sleep apnea, " excessive snoring or daytime drowsiness?: no            Reviewed and updated as needed this visit by clinical staff   Tobacco  Allergies  Meds              Reviewed and updated as needed this visit by Provider                 Social History     Tobacco Use     Smoking status: Never Smoker     Smokeless tobacco: Never Used     Tobacco comment: Lives in smoke free household   Substance Use Topics     Alcohol use: Yes     Comment: two to three drinks a week                           Current providers sharing in care for this patient include:   Patient Care Team:  Hanna Tai as PCP - General (Clinic)  Tamika Brown MD as Assigned PCP  Inés Araiza OD as Assigned Surgical Provider    The following health maintenance items are reviewed in Epic and correct as of today:  Health Maintenance   Topic Date Due     ANNUAL REVIEW OF HM ORDERS  Never done     ZOSTER IMMUNIZATION (2 of 2) 12/11/2020     MEDICARE ANNUAL WELLNESS VISIT  07/15/2021     FALL RISK ASSESSMENT  07/15/2021     INFLUENZA VACCINE (1) 09/01/2021     HEMOGLOBIN  06/01/2022     DIABETIC FOOT EXAM  06/01/2022     DTAP/TDAP/TD IMMUNIZATION (3 - Td or Tdap) 07/16/2022     A1C  08/09/2022     EYE EXAM  10/05/2022     BMP  02/09/2023     LIPID  02/09/2023     MICROALBUMIN  02/09/2023     COLORECTAL CANCER SCREENING  02/14/2023     MAMMO SCREENING  09/09/2023     ADVANCE CARE PLANNING  07/15/2025     DEXA  07/19/2027     HEPATITIS C SCREENING  Completed     PHQ-2  Completed     Pneumococcal Vaccine: 65+ Years  Completed     URINALYSIS  Completed     COVID-19 Vaccine  Completed     IPV IMMUNIZATION  Aged Out     MENINGITIS IMMUNIZATION  Aged Out     Labs reviewed in [facility]  Mammogram Screening: Mammogram Screening: Recommended mammography every 1-2 years with patient discussion and risk factor consideration    ROS:  Constitutional, HEENT, cardiovascular, pulmonary, gi and gu systems are negative, except as otherwise noted.    OBJECTIVE:  "  /82   Pulse 98   Temp 97.7  F (36.5  C)   Wt 87.1 kg (192 lb)   SpO2 99%   BMI 30.99 kg/m   Estimated body mass index is 30.99 kg/m  as calculated from the following:    Height as of 6/1/21: 1.676 m (5' 6\").    Weight as of this encounter: 87.1 kg (192 lb).  EXAM:   GENERAL: healthy, alert and no distress  EYES: Eyes grossly normal to inspection, PERRL and conjunctivae and sclerae normal  HENT: ear canals and TM's normal, nose and mouth without ulcers or lesions  NECK: no adenopathy, no asymmetry, masses, or scars and thyroid normal to palpation  RESP: lungs clear to auscultation - no rales, rhonchi or wheezes  CV: regular rate and rhythm, normal S1 S2, no S3 or S4, no murmur, click or rub, no peripheral edema and peripheral pulses strong  ABDOMEN: soft, nontender, no hepatosplenomegaly, no masses and bowel sounds normal  MS: no gross musculoskeletal defects noted, no edema  SKIN: no suspicious lesions or rashes  NEURO: Normal strength and tone, mentation intact and speech normal  PSYCH: mentation appears normal, affect normal/bright    Diagnostic Test Results:  Labs reviewed in Epic    ASSESSMENT / PLAN:   (Z00.00) Encounter for Medicare annual wellness exam  (primary encounter diagnosis)  Comment:   Plan:     Patient has been advised of split billing requirements and indicates understanding: Yes    COUNSELING:  Reviewed preventive health counseling, as reflected in patient instructions       Regular exercise       Healthy diet/nutrition       Vision screening       Dental care    Estimated body mass index is 30.99 kg/m  as calculated from the following:    Height as of 6/1/21: 1.676 m (5' 6\").    Weight as of this encounter: 87.1 kg (192 lb).    Weight management plan: Discussed healthy diet and exercise guidelines    She reports that she has never smoked. She has never used smokeless tobacco.    Appropriate preventive services were discussed with this patient, including applicable screening as " appropriate for cardiovascular disease, diabetes, osteopenia/osteoporosis, and glaucoma.  As appropriate for age/gender, discussed screening for colorectal cancer, prostate cancer, breast cancer, and cervical cancer. Checklist reviewing preventive services available has been given to the patient.    Reviewed patients plan of care and provided an AVS. The Intermediate Care Plan ( asthma action plan, low back pain action plan, and migraine action plan) for Merline meets the Care Plan requirement. This Care Plan has been established and reviewed with the Patient.    Counseling Resources:  ATP IV Guidelines  Pooled Cohorts Equation Calculator  Breast Cancer Risk Calculator  BRCA-Related Cancer Risk Assessment: FHS-7 Tool  FRAX Risk Assessment  ICSI Preventive Guidelines  Dietary Guidelines for Americans, 2010  [employer] MyPlate  ASA Prophylaxis  Lung CA Screening    Tamika Brown MD  [facility] [place]  Answers for HPI/ROS submitted by the patient on 3/8/2022  Frequency of checking blood sugars:: a few times a month  What time of day are you checking your blood sugars : before meals  Have you had any blood sugars above 200?: No  Have you had any blood sugars below 70?: No  Hypoglycemia symptoms:: none  Diabetic concerns:: none  Paraesthesia present:: numbness in feet  How many servings of fruits and vegetables do you eat daily?: 2-3  On average, how many sweetened beverages do you drink each day (Examples: soda, juice, sweet tea, etc.  Do NOT count diet or artificially sweetened beverages)?: 0  How many minutes a day do you exercise enough to make your heart beat faster?: 20 to 29  How many days a week do you exercise enough to make your heart beat faster?: 3 or less  How many days per week do you miss taking your medication?: 0

## 2022-05-23 ENCOUNTER — LAB (OUTPATIENT)
Dept: LAB | Facility: CLINIC | Age: 67
End: 2022-05-23
Payer: COMMERCIAL

## 2022-05-23 DIAGNOSIS — N18.30 CHRONIC KIDNEY DISEASE, STAGE III (MODERATE) (H): ICD-10-CM

## 2022-05-23 LAB
ALBUMIN UR-MCNC: 100 MG/DL
ANION GAP SERPL CALCULATED.3IONS-SCNC: 10 MMOL/L (ref 3–14)
APPEARANCE UR: CLEAR
BACTERIA #/AREA URNS HPF: ABNORMAL /HPF
BILIRUB UR QL STRIP: NEGATIVE
BUN SERPL-MCNC: 30 MG/DL (ref 7–30)
CALCIUM SERPL-MCNC: 9.4 MG/DL (ref 8.5–10.1)
CHLORIDE BLD-SCNC: 108 MMOL/L (ref 94–109)
CO2 SERPL-SCNC: 22 MMOL/L (ref 20–32)
COLOR UR AUTO: YELLOW
CREAT SERPL-MCNC: 1.28 MG/DL (ref 0.52–1.04)
GFR SERPL CREATININE-BSD FRML MDRD: 46 ML/MIN/1.73M2
GLUCOSE BLD-MCNC: 100 MG/DL (ref 70–99)
GLUCOSE UR STRIP-MCNC: NEGATIVE MG/DL
HGB UR QL STRIP: ABNORMAL
KETONES UR STRIP-MCNC: NEGATIVE MG/DL
LEUKOCYTE ESTERASE UR QL STRIP: ABNORMAL
NITRATE UR QL: NEGATIVE
PH UR STRIP: 5.5 [PH] (ref 5–7)
POTASSIUM BLD-SCNC: 4 MMOL/L (ref 3.4–5.3)
RBC #/AREA URNS AUTO: ABNORMAL /HPF
SODIUM SERPL-SCNC: 140 MMOL/L (ref 133–144)
SP GR UR STRIP: 1.02 (ref 1–1.03)
UROBILINOGEN UR STRIP-ACNC: 0.2 E.U./DL
WBC #/AREA URNS AUTO: ABNORMAL /HPF

## 2022-05-23 PROCEDURE — 36415 COLL VENOUS BLD VENIPUNCTURE: CPT

## 2022-05-23 PROCEDURE — 80048 BASIC METABOLIC PNL TOTAL CA: CPT

## 2022-05-23 PROCEDURE — 81001 URINALYSIS AUTO W/SCOPE: CPT

## 2022-05-25 LAB
COLLECT DURATION TIME UR: 24 H
CREAT 24H UR-MRATE: 0.93 G/SPEC (ref 0.8–1.8)
CREAT UR-MCNC: 39 MG/DL
PROT 24H UR-MRATE: 1.31 G/SPEC (ref 0.04–0.23)
PROT UR-MCNC: 0.55 G/L
PROT/CREAT 24H UR: 1.41 G/G CR (ref 0–0.2)
SPECIMEN VOL UR: 2375 ML

## 2022-05-25 PROCEDURE — 84156 ASSAY OF PROTEIN URINE: CPT

## 2022-05-25 PROCEDURE — 81050 URINALYSIS VOLUME MEASURE: CPT

## 2022-06-21 ENCOUNTER — ANCILLARY PROCEDURE (OUTPATIENT)
Dept: CT IMAGING | Facility: CLINIC | Age: 67
End: 2022-06-21
Attending: INTERNAL MEDICINE
Payer: COMMERCIAL

## 2022-06-21 DIAGNOSIS — D47.2 NEUROPATHY WITH IGA MONOCLONAL GAMMOPATHY (H): ICD-10-CM

## 2022-06-21 DIAGNOSIS — G63 NEUROPATHY WITH IGA MONOCLONAL GAMMOPATHY (H): ICD-10-CM

## 2022-06-21 PROCEDURE — 74176 CT ABD & PELVIS W/O CONTRAST: CPT | Mod: TC | Performed by: RADIOLOGY

## 2022-08-27 ENCOUNTER — HEALTH MAINTENANCE LETTER (OUTPATIENT)
Age: 67
End: 2022-08-27

## 2022-09-04 DIAGNOSIS — E11.22 TYPE 2 DIABETES MELLITUS WITH STAGE 3A CHRONIC KIDNEY DISEASE, WITHOUT LONG-TERM CURRENT USE OF INSULIN (H): ICD-10-CM

## 2022-09-04 DIAGNOSIS — N18.31 TYPE 2 DIABETES MELLITUS WITH STAGE 3A CHRONIC KIDNEY DISEASE, WITHOUT LONG-TERM CURRENT USE OF INSULIN (H): ICD-10-CM

## 2022-11-28 ENCOUNTER — ANCILLARY PROCEDURE (OUTPATIENT)
Dept: MAMMOGRAPHY | Facility: CLINIC | Age: 67
End: 2022-11-28
Payer: COMMERCIAL

## 2022-11-28 DIAGNOSIS — Z12.31 VISIT FOR SCREENING MAMMOGRAM: ICD-10-CM

## 2022-11-28 PROCEDURE — 77067 SCR MAMMO BI INCL CAD: CPT | Mod: TC | Performed by: STUDENT IN AN ORGANIZED HEALTH CARE EDUCATION/TRAINING PROGRAM

## 2022-11-28 PROCEDURE — 77063 BREAST TOMOSYNTHESIS BI: CPT | Mod: TC | Performed by: STUDENT IN AN ORGANIZED HEALTH CARE EDUCATION/TRAINING PROGRAM

## 2022-12-03 DIAGNOSIS — E11.22 TYPE 2 DIABETES MELLITUS WITH STAGE 3A CHRONIC KIDNEY DISEASE, WITHOUT LONG-TERM CURRENT USE OF INSULIN (H): ICD-10-CM

## 2022-12-03 DIAGNOSIS — N18.31 TYPE 2 DIABETES MELLITUS WITH STAGE 3A CHRONIC KIDNEY DISEASE, WITHOUT LONG-TERM CURRENT USE OF INSULIN (H): ICD-10-CM

## 2022-12-27 DIAGNOSIS — E11.22 TYPE 2 DIABETES MELLITUS WITH STAGE 3A CHRONIC KIDNEY DISEASE, WITHOUT LONG-TERM CURRENT USE OF INSULIN (H): ICD-10-CM

## 2022-12-27 DIAGNOSIS — N18.31 TYPE 2 DIABETES MELLITUS WITH STAGE 3A CHRONIC KIDNEY DISEASE, WITHOUT LONG-TERM CURRENT USE OF INSULIN (H): ICD-10-CM

## 2023-01-06 DIAGNOSIS — E11.22 TYPE 2 DIABETES MELLITUS WITH STAGE 3A CHRONIC KIDNEY DISEASE, WITHOUT LONG-TERM CURRENT USE OF INSULIN (H): ICD-10-CM

## 2023-01-06 DIAGNOSIS — N18.31 TYPE 2 DIABETES MELLITUS WITH STAGE 3A CHRONIC KIDNEY DISEASE, WITHOUT LONG-TERM CURRENT USE OF INSULIN (H): ICD-10-CM

## 2023-02-08 ENCOUNTER — OFFICE VISIT (OUTPATIENT)
Dept: OPTOMETRY | Facility: CLINIC | Age: 68
End: 2023-02-08
Payer: COMMERCIAL

## 2023-02-08 DIAGNOSIS — H52.03 HYPEROPIA, BILATERAL: ICD-10-CM

## 2023-02-08 DIAGNOSIS — H52.4 PRESBYOPIA: ICD-10-CM

## 2023-02-08 DIAGNOSIS — E11.9 TYPE 2 DIABETES MELLITUS WITHOUT COMPLICATION, WITHOUT LONG-TERM CURRENT USE OF INSULIN (H): ICD-10-CM

## 2023-02-08 DIAGNOSIS — Z01.01 VISION EXAM WITH ABNORMAL FINDINGS: Primary | ICD-10-CM

## 2023-02-08 DIAGNOSIS — H52.223 REGULAR ASTIGMATISM OF BOTH EYES: ICD-10-CM

## 2023-02-08 PROCEDURE — 92015 DETERMINE REFRACTIVE STATE: CPT | Performed by: OPTOMETRIST

## 2023-02-08 PROCEDURE — 92014 COMPRE OPH EXAM EST PT 1/>: CPT | Performed by: OPTOMETRIST

## 2023-02-08 RX ORDER — ELECTROLYTES/DEXTROSE
SOLUTION, ORAL ORAL
COMMUNITY

## 2023-02-08 ASSESSMENT — REFRACTION_WEARINGRX
OS_AXIS: 155
OS_AXIS: 152
SPECS_TYPE: SVL READING
OS_SPHERE: +0.75
OS_SPHERE: +2.50
OS_CYLINDER: +1.50
OD_AXIS: 010
OD_SPHERE: +0.25
OD_CYLINDER: +1.25
OS_CYLINDER: +1.00
OD_SPHERE: +2.00
SPECS_TYPE: SVL DISTANCE
OD_CYLINDER: +1.00
OD_AXIS: 020

## 2023-02-08 ASSESSMENT — CONF VISUAL FIELD
OS_NORMAL: 1
OS_INFERIOR_NASAL_RESTRICTION: 0
OD_NORMAL: 1
METHOD: COUNTING FINGERS
OD_SUPERIOR_TEMPORAL_RESTRICTION: 0
OD_INFERIOR_TEMPORAL_RESTRICTION: 0
OS_INFERIOR_TEMPORAL_RESTRICTION: 0
OD_SUPERIOR_NASAL_RESTRICTION: 0
OS_SUPERIOR_TEMPORAL_RESTRICTION: 0
OD_INFERIOR_NASAL_RESTRICTION: 0
OS_SUPERIOR_NASAL_RESTRICTION: 0

## 2023-02-08 ASSESSMENT — VISUAL ACUITY
OS_SC+: -1
OS_SC: 20/30
OS_CC: 20/30
OD_SC+: -1
OD_CC: 20/20
CORRECTION_TYPE: GLASSES
OD_CC: 20/30
OS_CC: 20/30
OS_CC+: +1
METHOD: SNELLEN - LINEAR
OD_SC: 20/30

## 2023-02-08 ASSESSMENT — REFRACTION_MANIFEST
OD_AXIS: 007
OD_CYLINDER: +1.25
OS_SPHERE: +0.25
OS_ADD: +2.50
OS_AXIS: 146
OD_CYLINDER: +1.25
OD_AXIS: 015
OD_ADD: +2.50
OD_SPHERE: +0.25
OD_SPHERE: +0.75
OS_CYLINDER: +1.50
OS_SPHERE: +0.75
OS_AXIS: 150
OS_CYLINDER: +1.50

## 2023-02-08 ASSESSMENT — CUP TO DISC RATIO
OD_RATIO: 0.5
OS_RATIO: 0.5

## 2023-02-08 ASSESSMENT — KERATOMETRY
OD_K2POWER_DIOPTERS: 43.50
OS_K1POWER_DIOPTERS: 42.75
OS_AXISANGLE2_DEGREES: 32
OD_AXISANGLE2_DEGREES: 145
OD_K1POWER_DIOPTERS: 43.00
OS_K2POWER_DIOPTERS: 43.75

## 2023-02-08 ASSESSMENT — PACHYMETRY
OD_CT(UM): 668
OS_CT(UM): 671

## 2023-02-08 ASSESSMENT — EXTERNAL EXAM - LEFT EYE: OS_EXAM: NORMAL

## 2023-02-08 ASSESSMENT — TONOMETRY
IOP_METHOD: APPLANATION
OD_IOP_MMHG: 20
OS_IOP_MMHG: 20

## 2023-02-08 ASSESSMENT — SLIT LAMP EXAM - LIDS
COMMENTS: 1+ DERMATOCHALASIS
COMMENTS: 1+ DERMATOCHALASIS

## 2023-02-08 ASSESSMENT — EXTERNAL EXAM - RIGHT EYE: OD_EXAM: NORMAL

## 2023-02-08 NOTE — PATIENT INSTRUCTIONS
Optional to fill new glasses prescription, minimal change   Monitor mild cataracts yearly   Keep blood sugar under good control  Return in 1 year for diabetic eye exam      Blood sugar and blood pressure control are very important in the prevention of ocular complications from diabetes.       Inés Araiza, OD  [phone]

## 2023-02-08 NOTE — LETTER
2/8/2023         RE: Merline Max  [id number]  [facility] [address]        Dear Colleague,    Thank you for referring your patient, Merline Max, to the [facility]. No diabetic retinopathy was found at eye exam.  Please see a copy of my visit note below.    Chief Complaint   Patient presents with     Diabetic Eye Exam        Chief Complaint(s) and History of Present Illness(es)     Diabetic Eye Exam            Vision: is stable    Diabetes Type: Type 2    Blood Sugars: is controlled               Lab Results   Component Value Date    A1C 5.8 02/09/2022    A1C 5.6 05/24/2021    A1C 6.0 07/15/2020    A1C 6.3 01/09/2020    A1C 6.1 12/29/2016    A1C 6.0 06/15/2016            Last Eye Exam: 10/5/21  Dilated Previously: Yes    What are you currently using to see?  Glasses, a pair for DV and a pair for NV    Distance Vision Acuity: Satisfied with vision for the most part, depends on the day. Doesn't always wear glasses to drive     Near Vision Acuity: Satisfied with vision while reading and using computer with glasses, makes it much easier.     Eye Comfort: Itches and Mix. Sometimes the skin around her eyes sometimes burns from the tearing   Do you use eye drops? 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       OBJECTIVE: See Ophthalmology exam    ASSESSMENT:    ICD-10-CM    1. Vision exam with abnormal findings  Z01.01       2. Type 2 diabetes mellitus without complication, without long-term current use of insulin (H)  E11.9       3. Hyperopia, bilateral  H52.03       4. Regular astigmatism of both eyes  H52.223       5. Presbyopia  H52.4           PLAN:    Merline Max aware  eye exam results will be sent to Clinic - [facility].  Patient Instructions   Optional to fill new glasses  prescription, minimal change   Monitor mild cataracts yearly   Keep blood sugar under good control  Return in 1 year for diabetic eye exam      Blood sugar and blood pressure control are very important in the prevention of ocular complications from diabetes.       Inés Araiza, OD  [phone]                        Again, thank you for allowing me to participate in the care of your patient.        Sincerely,        Inés Araiza, OD

## 2023-02-08 NOTE — PROGRESS NOTES
Chief Complaint   Patient presents with     Diabetic Eye Exam        Chief Complaint(s) and History of Present Illness(es)     Diabetic Eye Exam            Vision: is stable    Diabetes Type: Type 2    Blood Sugars: is controlled               Lab Results   Component Value Date    A1C 5.8 02/09/2022    A1C 5.6 05/24/2021    A1C 6.0 07/15/2020    A1C 6.3 01/09/2020    A1C 6.1 12/29/2016    A1C 6.0 06/15/2016            Last Eye Exam: 10/5/21  Dilated Previously: Yes    What are you currently using to see?  Glasses, a pair for DV and a pair for NV    Distance Vision Acuity: Satisfied with vision for the most part, depends on the day. Doesn't always wear glasses to drive     Near Vision Acuity: Satisfied with vision while reading and using computer with glasses, makes it much easier.     Eye Comfort: Itches and Mix. Sometimes the skin around her eyes sometimes burns from the tearing   Do you use eye drops? 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       OBJECTIVE: See Ophthalmology exam    ASSESSMENT:    ICD-10-CM    1. Vision exam with abnormal findings  Z01.01       2. Type 2 diabetes mellitus without complication, without long-term current use of insulin (H)  E11.9       3. Hyperopia, bilateral  H52.03       4. Regular astigmatism of both eyes  H52.223       5. Presbyopia  H52.4           PLAN:    Merline Max aware  eye exam results will be sent to Clinic - [facility].  Patient Instructions   Optional to fill new glasses prescription, minimal change   Monitor mild cataracts yearly   Keep blood sugar under good control  Return in 1 year for diabetic eye exam      Blood sugar and blood pressure control are very important in the prevention of ocular complications from diabetes.       Inés Araiza,  OD  [phone]

## 2023-03-29 ENCOUNTER — TELEPHONE (OUTPATIENT)
Dept: GASTROENTEROLOGY | Facility: CLINIC | Age: 68
End: 2023-03-29
Payer: COMMERCIAL

## 2023-03-29 NOTE — TELEPHONE ENCOUNTER
Screening Questions  BLUE  KIND OF PREP RED  LOCATION [review exclusion criteria] GREEN  SEDATION TYPE        Y Are you active on mychart?       Tamika Brown MD    Ordering/Referring Provider?        [employer]BS What type of coverage do you have?      N Have you had a positive covid test in the last 14 days?     31 1. BMI  [BMI 40+ - review exclusion criteria]    Y  2. Are you able to give consent for your medical care? [IF NO,RN REVIEW]          N  3. Are you taking any prescription pain medications on a routine schedule   (ex narcotics: oxycodone, roxicodone, oxycontin,  and percocet)? [RN Review]          3a. EXTENDED PREP What kind of prescription?     N 4. Do you have any chemical dependencies such as alcohol, street drugs, or methadone?        **If yes 3- 5 , please schedule with MAC sedation.**          IF YES TO ANY 6 - 10 - HOSPITAL SETTING ONLY.     N 6.   Do you need assistance transferring?     N 7.   Have you had a heart or lung transplant?    N 8.   Are you currently on dialysis?   N 9.   Do you use daily home oxygen?   N 10. Do you take nitroglycerin?   10a.  If yes, how often?     11. [FEMALES]  N Are you currently pregnant?    11a.  If yes, how many weeks? [ Greater than 12 weeks, OR NEEDED]    N 12. Do you have Pulmonary Hypertension? *NEED PAC APPT AT [facility] w/ MAC*     N 13. [review exclusion criteria]  Do you have any implantable devices in your body (pacemaker, defib, LVAD)?    N 14. In the past 6 months, have you had any heart related issues including cardiomyopathy or heart attack?     14a.  If yes, did it require cardiac stenting if so when?     N 15. Have you had a stroke or Transient ischemic attack (TIA - aka  mini stroke ) within 6 months?      N 16. Do you have mod to severe Obstructive Sleep Apnea?  [Hospital only]    N 17. Do you have SEVERE AND UNCONTROLLED asthma? *NEED PAC APPT AT [facility] w/MAC*     18. Are you currently taking any blood thinners?     18a. No. Continue to  "19.   18b. Yes/no Blood Thinner: No [CONTINUE TO #19]    N 19. Do you take the medication Phentermine?    19a. If yes, \"Hold for 7 days before procedure.  Please consult your prescribing provider if you have questions about holding this medication.\"     Y  20. Do you have chronic kidney disease?      Y  21. Do you have a diagnosis of diabetes?     N  22. On a regular basis do you go 3-5 days between bowel movements?      23. Preferred LOCAL Pharmacy for Pre Prescription    [ LIST ONLY ONE PHARMACY]        [facility] [id number] [facility] [employer] - [facility] [address] [place] [address] DRIVE      - CLOSING REMINDERS -    Informed patient they will need an adult    Cannot take any type of public or medical transportation alone    Conscious Sedation- Needs  for 6 hours after the procedure       MAC/General-Needs  for 24 hours after procedure    Pre-Procedure Covid test to be completed [[facility] PCR Testing Required]    Confirmed Nurse will call to complete assessment       - SCHEDULING DETAILS -  NO Hospital Setting Required? If yes, what is the exclusion?:    BLADIMIR  Surgeon    6/5  Date of Procedure  Lower Endoscopy [Colonoscopy]  Type of Procedure Scheduled  [facility]   STANDARD [facility]- If you answer yes to questions #8, #20, #21 [  pts ]Which Colonoscopy Prep was Sent?     MODERATE Sedation Type     N PAC / Pre-op Required                 "

## 2023-04-11 ENCOUNTER — LAB (OUTPATIENT)
Dept: LAB | Facility: CLINIC | Age: 68
End: 2023-04-11
Payer: COMMERCIAL

## 2023-04-11 DIAGNOSIS — N18.31 TYPE 2 DIABETES MELLITUS WITH STAGE 3A CHRONIC KIDNEY DISEASE, WITHOUT LONG-TERM CURRENT USE OF INSULIN (H): ICD-10-CM

## 2023-04-11 DIAGNOSIS — E11.22 TYPE 2 DIABETES MELLITUS WITH STAGE 3A CHRONIC KIDNEY DISEASE, WITHOUT LONG-TERM CURRENT USE OF INSULIN (H): ICD-10-CM

## 2023-04-11 LAB
ERYTHROCYTE [DISTWIDTH] IN BLOOD BY AUTOMATED COUNT: 14 % (ref 10–15)
HBA1C MFR BLD: 5.7 % (ref 0–5.6)
HCT VFR BLD AUTO: 37.1 % (ref 35–47)
HGB BLD-MCNC: 12 G/DL (ref 11.7–15.7)
MCH RBC QN AUTO: 31 PG (ref 26.5–33)
MCHC RBC AUTO-ENTMCNC: 32.3 G/DL (ref 31.5–36.5)
MCV RBC AUTO: 96 FL (ref 78–100)
PLATELET # BLD AUTO: 102 10E3/UL (ref 150–450)
RBC # BLD AUTO: 3.87 10E6/UL (ref 3.8–5.2)
WBC # BLD AUTO: 3.7 10E3/UL (ref 4–11)

## 2023-04-11 PROCEDURE — 80048 BASIC METABOLIC PNL TOTAL CA: CPT

## 2023-04-11 PROCEDURE — 85027 COMPLETE CBC AUTOMATED: CPT

## 2023-04-11 PROCEDURE — 83036 HEMOGLOBIN GLYCOSYLATED A1C: CPT

## 2023-04-11 PROCEDURE — 82570 ASSAY OF URINE CREATININE: CPT

## 2023-04-11 PROCEDURE — 36415 COLL VENOUS BLD VENIPUNCTURE: CPT

## 2023-04-11 PROCEDURE — 82043 UR ALBUMIN QUANTITATIVE: CPT

## 2023-04-11 PROCEDURE — 80061 LIPID PANEL: CPT

## 2023-04-12 LAB
ANION GAP SERPL CALCULATED.3IONS-SCNC: 2 MMOL/L (ref 3–14)
BUN SERPL-MCNC: 32 MG/DL (ref 7–30)
CALCIUM SERPL-MCNC: 8.7 MG/DL (ref 8.5–10.1)
CHLORIDE BLD-SCNC: 112 MMOL/L (ref 94–109)
CHOLEST SERPL-MCNC: 168 MG/DL
CO2 SERPL-SCNC: 27 MMOL/L (ref 20–32)
CREAT SERPL-MCNC: 1.14 MG/DL (ref 0.52–1.04)
CREAT UR-MCNC: 64 MG/DL
FASTING STATUS PATIENT QL REPORTED: YES
GFR SERPL CREATININE-BSD FRML MDRD: 52 ML/MIN/1.73M2
GLUCOSE BLD-MCNC: 136 MG/DL (ref 70–99)
HDLC SERPL-MCNC: 49 MG/DL
LDLC SERPL CALC-MCNC: 58 MG/DL
MICROALBUMIN UR-MCNC: 1240 MG/L
MICROALBUMIN/CREAT UR: 1937.5 MG/G CR (ref 0–25)
NONHDLC SERPL-MCNC: 119 MG/DL
POTASSIUM BLD-SCNC: 4 MMOL/L (ref 3.4–5.3)
SODIUM SERPL-SCNC: 141 MMOL/L (ref 133–144)
TRIGL SERPL-MCNC: 303 MG/DL

## 2023-04-19 ENCOUNTER — OFFICE VISIT (OUTPATIENT)
Dept: FAMILY MEDICINE | Facility: CLINIC | Age: 68
End: 2023-04-19
Payer: COMMERCIAL

## 2023-04-19 VITALS
HEIGHT: 65 IN | RESPIRATION RATE: 16 BRPM | WEIGHT: 197 LBS | TEMPERATURE: 97.9 F | HEART RATE: 83 BPM | BODY MASS INDEX: 32.82 KG/M2 | DIASTOLIC BLOOD PRESSURE: 78 MMHG | OXYGEN SATURATION: 98 % | SYSTOLIC BLOOD PRESSURE: 133 MMHG

## 2023-04-19 DIAGNOSIS — N18.31 TYPE 2 DIABETES MELLITUS WITH STAGE 3A CHRONIC KIDNEY DISEASE, WITHOUT LONG-TERM CURRENT USE OF INSULIN (H): ICD-10-CM

## 2023-04-19 DIAGNOSIS — E11.22 TYPE 2 DIABETES MELLITUS WITH STAGE 3A CHRONIC KIDNEY DISEASE, WITHOUT LONG-TERM CURRENT USE OF INSULIN (H): ICD-10-CM

## 2023-04-19 DIAGNOSIS — I10 ESSENTIAL HYPERTENSION, BENIGN: ICD-10-CM

## 2023-04-19 DIAGNOSIS — E78.5 HYPERLIPIDEMIA LDL GOAL <100: ICD-10-CM

## 2023-04-19 DIAGNOSIS — K63.5 POLYP OF COLON, UNSPECIFIED PART OF COLON, UNSPECIFIED TYPE: ICD-10-CM

## 2023-04-19 DIAGNOSIS — N18.31 STAGE 3A CHRONIC KIDNEY DISEASE (H): ICD-10-CM

## 2023-04-19 DIAGNOSIS — Z00.00 ENCOUNTER FOR ANNUAL WELLNESS VISIT (AWV) IN MEDICARE PATIENT: Primary | ICD-10-CM

## 2023-04-19 PROCEDURE — 99213 OFFICE O/P EST LOW 20 MIN: CPT | Mod: 25 | Performed by: FAMILY MEDICINE

## 2023-04-19 PROCEDURE — 99207 PR FOOT EXAM NO CHARGE: CPT | Performed by: FAMILY MEDICINE

## 2023-04-19 PROCEDURE — G0439 PPPS, SUBSEQ VISIT: HCPCS | Performed by: FAMILY MEDICINE

## 2023-04-19 ASSESSMENT — PAIN SCALES - GENERAL: PAINLEVEL: NO PAIN (0)

## 2023-04-19 NOTE — PROGRESS NOTES
"  Assessment & Plan     Encounter for annual wellness visit (AWV) in Medicare patient  completed    Type 2 diabetes mellitus with stage 3a chronic kidney disease, without long-term current use of insulin (H)  At goal on meds, follow-up in 6 months  - metFORMIN (GLUCOPHAGE) 500 MG tablet; TAKE 2 TABLETS (1,000 MG) BY MOUTH 2 TIMES DAILY WITH MEALS  - FOOT EXAM    Polyp of colon, unspecified part of colon, unspecified type  Is scheduled for colonoscopy    Hyperlipidemia LDL goal <100  On statin at goal  Prescribed by nephrology    Stage 3a chronic kidney disease (H)  followed by nephrology    Essential hypertension, benign  At goal, followed by nephrology               BMI:   Estimated body mass index is 32.78 kg/m  as calculated from the following:    Height as of this encounter: 1.651 m (5' 5\").    Weight as of this encounter: 89.4 kg (197 lb).   Weight management plan: Discussed healthy diet and exercise guidelines        Tamika Brown MD  [facility]    Haily Rick is a 67 year old, presenting for the following health issues:  Diabetes        4/19/2023     1:46 PM   Additional Questions   Roomed by marko     History of Present Illness       Diabetes:   She presents for follow up of diabetes.  She is not checking blood glucose. She has no concerns regarding her diabetes at this time.  She is having numbness in feet.         She eats 2-3 servings of fruits and vegetables daily.She consumes 0 sweetened beverage(s) daily.She exercises with enough effort to increase her heart rate 20 to 29 minutes per day.  She exercises with enough effort to increase her heart rate 5 days per week.   She is taking medications regularly.           BP Readings from Last 2 Encounters:   04/19/23 133/78   03/08/22 137/82     Hemoglobin A1C (%)   Date Value   04/11/2023 5.7 (H)   02/09/2022 5.8 (H)   05/24/2021 5.6   07/15/2020 6.0 (H)     LDL Cholesterol Calculated (mg/dL)   Date Value   04/11/2023 58 " "  02/09/2022 78   05/24/2021 76   01/09/2020 86           Hypertension Follow-up      Do you check your blood pressure regularly outside of the clinic? Yes     Are you following a low salt diet? Yes    Are your blood pressures ever more than 140 on the top number (systolic) OR more   than 90 on the bottom number (diastolic), for example 140/90? No    Hyperlipidemia Follow-Up      Are you regularly taking any medication or supplement to lower your cholesterol?   Yes- simvastatin    Are you having muscle aches or other side effects that you think could be caused by your cholesterol lowering medication?  Yes- feet and calves    Diabetes well controlled on metformin  Is on statin and aspirin and arb  See kidney doctor due to large proteinuria and ckd3a  UTD with eye MD            Annual Wellness Visit    Patient has been advised of split billing requirements and indicates understanding: Yes     Are you in the first 12 months of your Medicare Part B coverage?  No    Physical Health:    In general, how would you rate your overall physical health? good    Outside of work, how many days during the week do you exercise?none    Outside of work, approximately how many minutes a day do you exercise?not applicable    If you drink alcohol do you typically have >3 drinks per day or >7 drinks per week? No    Do you usually eat at least 4 servings of fruit and vegetables a day, include whole grains & fiber and avoid regularly eating high fat or \"junk\" foods? Yes    Do you have any problems taking medications regularly? No    Do you have any side effects from medications? muscle cramping    Needs assistance for the following daily activities: no assistance needed    Which of the following safety concerns are present in your home?  none identified     Hearing impairment: No    In the past 6 months, have you been bothered by leaking of urine? no    Mental Health:    In general, how would you rate your overall mental or emotional " "health? good  PHQ-2 Score: 0    Do you feel safe in your environment? Yes    Have you ever done Advance Care Planning? (For example, a Health Directive, POLST, or a discussion with a medical provider or your loved ones about your wishes)? Yes, patient states has an Advance Care Planning document and will bring a copy to the clinic.    Fall risk:  Fallen 2 or more times in the past year?: No  Any fall with injury in the past year?: No    Cognitive Screening: no concerns based on direct observation    Do you have sleep apnea, excessive snoring or daytime drowsiness?: trouble sleeping     Social History     Tobacco Use     Smoking status: Never     Smokeless tobacco: Never     Tobacco comments:     Lives in smoke free household   Vaping Use     Vaping status: Never Used   Substance Use Topics     Alcohol use: Yes     Comment: two to three drinks a week              View : No data to display.              Do you have a current opioid prescription? No  Do you use any other controlled substances or medications that are not prescribed by a provider? None      Current providers sharing in care for this patient include:   Patient Care Team:  Tamika Brown MD as PCP - General (Family Medicine)  Tamika Brown MD as Assigned PCP  Inés Araiza OD as Assigned Surgical Provider    Patient has been advised of split billing requirements and indicates understanding: Yes    I have reviewed Opioid Use Disorder and Substance Use Disorder risk factors and made any needed referrals.         Review of Systems   Constitutional, HEENT, cardiovascular, pulmonary, gi and gu systems are negative, except as otherwise noted.      Objective    /78   Pulse 83   Temp 97.9  F (36.6  C) (Oral)   Resp 16   Ht 1.651 m (5' 5\")   Wt 89.4 kg (197 lb)   SpO2 98%   BMI 32.78 kg/m    Body mass index is 32.78 kg/m .  Physical Exam   GENERAL: healthy, alert and no distress  EYES: Eyes grossly normal to inspection, PERRL and " conjunctivae and sclerae normal  HENT: ear canals and TM's normal, nose and mouth without ulcers or lesions  NECK: no adenopathy, no asymmetry, masses, or scars and thyroid normal to palpation  RESP: lungs clear to auscultation - no rales, rhonchi or wheezes  CV: regular rate and rhythm, normal S1 S2, no S3 or S4, no murmur, click or rub, no peripheral edema and peripheral pulses strong  ABDOMEN: soft, nontender, no hepatosplenomegaly, no masses and bowel sounds normal  MS: no gross musculoskeletal defects noted, no edema  SKIN: no suspicious lesions or rashes  NEURO: Normal strength and tone, mentation intact and speech normal  PSYCH: mentation appears normal, affect normal/bright    No results found for this or any previous visit (from the past 24 hour(s)).

## 2023-05-18 ENCOUNTER — TELEPHONE (OUTPATIENT)
Dept: GASTROENTEROLOGY | Facility: CLINIC | Age: 68
End: 2023-05-18

## 2023-05-18 DIAGNOSIS — K63.5 POLYP OF COLON, UNSPECIFIED PART OF COLON, UNSPECIFIED TYPE: Primary | ICD-10-CM

## 2023-05-18 RX ORDER — BISACODYL 5 MG/1
TABLET, DELAYED RELEASE ORAL
Qty: 4 TABLET | Refills: 0 | Status: SHIPPED | OUTPATIENT
Start: 2023-05-18 | End: 2023-06-05

## 2023-05-18 NOTE — TELEPHONE ENCOUNTER
Patient scheduled for Colonoscopy  on 06.05.2023.     Discuss Covid policy.     Pre op exam needed? N/A    Arrival time: 1055. Procedure time 1140    Facility location: [facility]; [address], [place] [address]    Sedation type: Conscious sedation -discuss d/t torturous colon and difficulty on last procedure    NSAIDs? No NSAID medications per patient's medication list.  RN will verify with pre-assessment call.    Anticoagulations? No    Electronic implanted devices? No    Diabetic? Yes. Oral medications.  Metformin (glucophage). Patient to hold oral diabetic medications day of procedure.    Indication for procedure: Polyp of colon, unspecified part of colon, unspecified type    Bowel prep recommendation: Standard Golytely d/t CKD and  DM    Prep instructions sent via [employer] Bowel prep script sent to      [facility] [id number] [place] [facility] [address] [facility]    Pre visit planning completed.    Linda Dickinson RN  Endoscopy Procedure Pre Assessment RN

## 2023-05-24 NOTE — TELEPHONE ENCOUNTER
Second attempt for pre-assessment prior to upcoming colonoscopy     No answer.  Left message to return call [phone] #4    Sent [employer] message.    Linda Dickinson RN  Endoscopy Procedure Pre Assessment RN

## 2023-05-25 NOTE — TELEPHONE ENCOUNTER
Third attempt for pre-assessment prior to upcoming colonoscopy     No answer.  Left message to return call [phone] #4    Linda Dickinson RN  Endoscopy Procedure Pre Assessment RN

## 2023-05-30 NOTE — TELEPHONE ENCOUNTER
Pre assessment questions completed for upcoming Colonoscopy  procedure scheduled on 06.05.2023    COVID policy reviewed.     Reviewed procedural arrival time 1055 and facility location [facility]; [address], [place] [address]    Designated  policy reviewed. Instructed to have someone stay 6 hours post procedure. -Discussed sedation and patient felt last procedure went just fine and would like to continue with conscious sedation    NSAIDs? No    Anticoagulation/blood thinners? No    Electronic implanted devices? No    Diabetic? Yes. Oral medications.  Metformin (glucophage). Patient to hold oral diabetic medications day of procedure.    Reviewed procedure prep instructions.     Patient verbalized understanding and had no questions or concerns at this time.    Linda Dickinson, HUNTER  Endoscopy Procedure Pre Assessment RN

## 2023-06-05 ENCOUNTER — HOSPITAL ENCOUNTER (OUTPATIENT)
Facility: AMBULATORY SURGERY CENTER | Age: 68
Discharge: HOME | End: 2023-06-05
Attending: INTERNAL MEDICINE | Admitting: INTERNAL MEDICINE
Payer: COMMERCIAL

## 2023-06-05 VITALS
HEART RATE: 86 BPM | OXYGEN SATURATION: 96 % | DIASTOLIC BLOOD PRESSURE: 89 MMHG | RESPIRATION RATE: 16 BRPM | SYSTOLIC BLOOD PRESSURE: 133 MMHG | TEMPERATURE: 97 F

## 2023-06-05 LAB
COLONOSCOPY: NORMAL
GLUCOSE BLDC GLUCOMTR-MCNC: 109 MG/DL (ref 70–99)

## 2023-06-05 PROCEDURE — G8918 PT W/O PREOP ORDER IV AB PRO: HCPCS

## 2023-06-05 PROCEDURE — 88305 TISSUE EXAM BY PATHOLOGIST: CPT | Performed by: PATHOLOGY

## 2023-06-05 PROCEDURE — 45385 COLONOSCOPY W/LESION REMOVAL: CPT | Mod: PT

## 2023-06-05 PROCEDURE — G8907 PT DOC NO EVENTS ON DISCHARG: HCPCS

## 2023-06-05 RX ORDER — ONDANSETRON 4 MG/1
4 TABLET, ORALLY DISINTEGRATING ORAL EVERY 6 HOURS PRN
Status: DISCONTINUED | OUTPATIENT
Start: 2023-06-05 | End: 2023-06-06 | Stop reason: HOSPADM

## 2023-06-05 RX ORDER — NALOXONE HYDROCHLORIDE 0.4 MG/ML
0.2 INJECTION, SOLUTION INTRAMUSCULAR; INTRAVENOUS; SUBCUTANEOUS
Status: DISCONTINUED | OUTPATIENT
Start: 2023-06-05 | End: 2023-06-06 | Stop reason: HOSPADM

## 2023-06-05 RX ORDER — PROCHLORPERAZINE MALEATE 5 MG
5 TABLET ORAL EVERY 6 HOURS PRN
Status: DISCONTINUED | OUTPATIENT
Start: 2023-06-05 | End: 2023-06-06 | Stop reason: HOSPADM

## 2023-06-05 RX ORDER — LIDOCAINE 40 MG/G
CREAM TOPICAL
Status: DISCONTINUED | OUTPATIENT
Start: 2023-06-05 | End: 2023-06-06 | Stop reason: HOSPADM

## 2023-06-05 RX ORDER — ONDANSETRON 2 MG/ML
4 INJECTION INTRAMUSCULAR; INTRAVENOUS
Status: DISCONTINUED | OUTPATIENT
Start: 2023-06-05 | End: 2023-06-06 | Stop reason: HOSPADM

## 2023-06-05 RX ORDER — NALOXONE HYDROCHLORIDE 0.4 MG/ML
0.4 INJECTION, SOLUTION INTRAMUSCULAR; INTRAVENOUS; SUBCUTANEOUS
Status: DISCONTINUED | OUTPATIENT
Start: 2023-06-05 | End: 2023-06-06 | Stop reason: HOSPADM

## 2023-06-05 RX ORDER — FLUMAZENIL 0.1 MG/ML
0.2 INJECTION, SOLUTION INTRAVENOUS
Status: DISCONTINUED | OUTPATIENT
Start: 2023-06-05 | End: 2023-06-06 | Stop reason: HOSPADM

## 2023-06-05 RX ORDER — ONDANSETRON 2 MG/ML
4 INJECTION INTRAMUSCULAR; INTRAVENOUS EVERY 6 HOURS PRN
Status: DISCONTINUED | OUTPATIENT
Start: 2023-06-05 | End: 2023-06-06 | Stop reason: HOSPADM

## 2023-06-05 RX ORDER — FENTANYL CITRATE 50 UG/ML
INJECTION, SOLUTION INTRAMUSCULAR; INTRAVENOUS PRN
Status: DISCONTINUED | OUTPATIENT
Start: 2023-06-05 | End: 2023-06-05 | Stop reason: HOSPADM

## 2023-06-05 NOTE — H&P
[facility] Anesthesia Pre-op History and Physical    eMrline Max MRN# [id number]   Age: 67 year old YOB: 1955            Date of Exam 6/5/2023         Primary care provider: Tamika Brown         Chief Complaint and/or Reason for Procedure:     History of colon polyps         Active problem list:     Patient Active Problem List    Diagnosis Date Noted     Family history of premature coronary artery disease 01/01/2017     Priority: Medium     Advanced directives, counseling/discussion 06/15/2016     Priority: Medium     Advance Care Planning 6/15/2016: Pt was given info on ADV. Ray Bell MA             CKD (chronic kidney disease) stage 3, GFR 30-59 ml/min (H) 06/15/2016     Priority: Medium     Acute iritis left eye 02/25/2013     Priority: Medium     Type 2 diabetes mellitus with stage 3a chronic kidney disease, without long-term current use of insulin (H) 07/16/2012     Priority: Medium     Blurred vision 07/16/2012     Priority: Medium     Diabetes       Gouty arthritis 07/16/2012     Priority: Medium     Essential hypertension, benign 07/16/2012     Priority: Medium     Hyperlipidemia LDL goal <100 07/16/2012     Priority: Medium     Fatigue 07/16/2012     Priority: Medium     Elevated LFTs 07/16/2012     Priority: Medium     Colon polyp 07/16/2012     Priority: Medium     Ocular hypertension 06/19/2012     Priority: Medium     high of 24, thick pachymetry       Dry eye syndrome 11/07/2010     Priority: Medium     Recurrent iritis right eye 11/07/2010     Priority: Medium     IgA nephropathy 07/16/1997     Priority: Medium            Medications (include herbals and vitamins):   Any Plavix use in the last 7 days? No     Current Outpatient Medications   Medication Sig     allopurinol (ZYLOPRIM) 100 MG tablet Take 200 mg by mouth daily.     Aspirin (ASPIR-81 PO) Take 1 tablet by mouth daily.     cetirizine (ZYRTEC) 10 MG tablet Take 10 mg by mouth daily.     docusate sodium  (COLACE) 100 MG capsule Take 1 capsule by mouth as needed.     hydrochlorothiazide (MICROZIDE) 12.5 MG capsule Take 1 capsule by mouth daily.     irbesartan (AVAPRO) 300 MG tablet Take 300 mg by mouth At Bedtime.     ketotifen (ZADITOR) 0.025 % SOLN 1 drop as needed.     metFORMIN (GLUCOPHAGE) 500 MG tablet TAKE 2 TABLETS (1,000 MG) BY MOUTH 2 TIMES DAILY WITH MEALS     methylcellulose, laxative, (CITRUCEL) POWD Take 1 ounce at bedtime PRN.     Multiple Vitamin (MULTIVITAMIN ADULT) TABS      polyethylene glycol - propylene glycol (SYSTANE) 0.4-0.3 % SOLN Place 1 drop into both eyes 2 times daily as needed.     simvastatin (ZOCOR) 20 MG tablet Take 20 mg by mouth At Bedtime.     blood glucose (BEATRIZ CONTOUR) test strip Three times daily     Lancet Devices (AUTO-LANCET) MISC Three times daily     lancets thin MISC 1 each 2 times daily.     Current Facility-Administered Medications   Medication     lidocaine (LMX4) kit     lidocaine 1 % 0.1-1 mL     ondansetron (ZOFRAN) injection 4 mg     sodium chloride (PF) 0.9% PF flush 3 mL     sodium chloride (PF) 0.9% PF flush 3 mL             Allergies:    No Known Allergies  Allergy to Latex? No  Allergy to tape?   No  Intolerances:             Physical Exam:   All vitals have been reviewed  Patient Vitals for the past 8 hrs:   BP Temp Temp src Resp SpO2   06/05/23 1100 (!) 145/82 97  F (36.1  C) Tympanic 16 96 %     No intake/output data recorded.  Lungs:   No increased work of breathing, good air exchange, clear to auscultation bilaterally, no crackles or wheezing     Cardiovascular:   normal S1 and S2             Lab / Radiology Results:            Anesthetic risk and/or ASA classification:     2  Vicki Butler DO

## 2023-06-07 LAB
PATH REPORT.COMMENTS IMP SPEC: NORMAL
PATH REPORT.COMMENTS IMP SPEC: NORMAL
PATH REPORT.FINAL DX SPEC: NORMAL
PATH REPORT.GROSS SPEC: NORMAL
PATH REPORT.MICROSCOPIC SPEC OTHER STN: NORMAL
PATH REPORT.RELEVANT HX SPEC: NORMAL
PHOTO IMAGE: NORMAL

## 2023-06-08 DIAGNOSIS — K63.5 POLYP OF COLON, UNSPECIFIED PART OF COLON, UNSPECIFIED TYPE: Primary | ICD-10-CM

## 2023-06-12 ENCOUNTER — MYC MEDICAL ADVICE (OUTPATIENT)
Dept: FAMILY MEDICINE | Facility: CLINIC | Age: 68
End: 2023-06-12
Payer: COMMERCIAL

## 2023-06-12 DIAGNOSIS — G47.30 SLEEP APNEA, UNSPECIFIED TYPE: Primary | ICD-10-CM

## 2023-06-16 DIAGNOSIS — N18.31 CHRONIC KIDNEY DISEASE (CKD) STAGE G3A/A1, MODERATELY DECREASED GLOMERULAR FILTRATION RATE (GFR) BETWEEN 45-59 ML/MIN/1.73 SQUARE METER AND ALBUMINURIA CREATININE RATIO LESS THAN 30 MG/G (H): Primary | ICD-10-CM

## 2023-06-27 ENCOUNTER — LAB (OUTPATIENT)
Dept: LAB | Facility: CLINIC | Age: 68
End: 2023-06-27
Payer: COMMERCIAL

## 2023-06-27 DIAGNOSIS — N18.31 CHRONIC KIDNEY DISEASE (CKD) STAGE G3A/A1, MODERATELY DECREASED GLOMERULAR FILTRATION RATE (GFR) BETWEEN 45-59 ML/MIN/1.73 SQUARE METER AND ALBUMINURIA CREATININE RATIO LESS THAN 30 MG/G (H): ICD-10-CM

## 2023-06-27 LAB
ALBUMIN MFR UR ELPH: 161 MG/DL
ALBUMIN UR-MCNC: 100 MG/DL
ANION GAP SERPL CALCULATED.3IONS-SCNC: 13 MMOL/L (ref 7–15)
APPEARANCE UR: CLEAR
BILIRUB UR QL STRIP: NEGATIVE
BUN SERPL-MCNC: 27.7 MG/DL (ref 8–23)
CALCIUM SERPL-MCNC: 9.9 MG/DL (ref 8.8–10.2)
CHLORIDE SERPL-SCNC: 104 MMOL/L (ref 98–107)
COLOR UR AUTO: YELLOW
CREAT SERPL-MCNC: 1.25 MG/DL (ref 0.51–0.95)
CREAT UR-MCNC: 91.9 MG/DL
DEPRECATED HCO3 PLAS-SCNC: 26 MMOL/L (ref 22–29)
GFR SERPL CREATININE-BSD FRML MDRD: 47 ML/MIN/1.73M2
GLUCOSE SERPL-MCNC: 124 MG/DL (ref 70–99)
GLUCOSE UR STRIP-MCNC: NEGATIVE MG/DL
HGB UR QL STRIP: ABNORMAL
KETONES UR STRIP-MCNC: NEGATIVE MG/DL
LEUKOCYTE ESTERASE UR QL STRIP: NEGATIVE
NITRATE UR QL: NEGATIVE
PH UR STRIP: 5.5 [PH] (ref 5–7)
POTASSIUM SERPL-SCNC: 4.2 MMOL/L (ref 3.4–5.3)
PROT/CREAT 24H UR: 1.75 MG/MG CR (ref 0–0.2)
RBC #/AREA URNS AUTO: ABNORMAL /HPF
SODIUM SERPL-SCNC: 143 MMOL/L (ref 136–145)
SP GR UR STRIP: >=1.03 (ref 1–1.03)
UROBILINOGEN UR STRIP-ACNC: 0.2 E.U./DL
WBC #/AREA URNS AUTO: ABNORMAL /HPF

## 2023-06-27 PROCEDURE — 80048 BASIC METABOLIC PNL TOTAL CA: CPT

## 2023-06-27 PROCEDURE — 84156 ASSAY OF PROTEIN URINE: CPT

## 2023-06-27 PROCEDURE — 36415 COLL VENOUS BLD VENIPUNCTURE: CPT

## 2023-06-27 PROCEDURE — 81001 URINALYSIS AUTO W/SCOPE: CPT

## 2023-07-05 ENCOUNTER — TELEPHONE (OUTPATIENT)
Dept: FAMILY MEDICINE | Facility: CLINIC | Age: 68
End: 2023-07-05
Payer: COMMERCIAL
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
## 2023-07-06 NOTE — TELEPHONE ENCOUNTER
Called and LVM to call [phone] option 2 to schedule an appointment  TC:- Korey OK'd patient to use HERRERA slot on the week of 7/17/23  Thank you,  Stephanie VALVERDE    [phone]

## 2023-07-10 NOTE — TELEPHONE ENCOUNTER
Left message on patient's home phone to return call to clinic.  Francheska SANDOVAL    [facility]

## 2023-07-11 NOTE — TELEPHONE ENCOUNTER
Left message on home answering machine to call clinic back.  Francheska SANDOVAL    [facility]

## 2023-07-17 NOTE — TELEPHONE ENCOUNTER
Patient called back and I have scheduled her for 7/25/2023 the first HERRERA.  Peyton MONTENEGRO - Gianni

## 2023-07-25 ENCOUNTER — ANCILLARY PROCEDURE (OUTPATIENT)
Dept: GENERAL RADIOLOGY | Facility: CLINIC | Age: 68
End: 2023-07-25
Attending: FAMILY MEDICINE
Payer: COMMERCIAL

## 2023-07-25 ENCOUNTER — OFFICE VISIT (OUTPATIENT)
Dept: FAMILY MEDICINE | Facility: CLINIC | Age: 68
End: 2023-07-25
Payer: COMMERCIAL

## 2023-07-25 VITALS
RESPIRATION RATE: 14 BRPM | DIASTOLIC BLOOD PRESSURE: 70 MMHG | HEART RATE: 81 BPM | SYSTOLIC BLOOD PRESSURE: 138 MMHG | BODY MASS INDEX: 32.65 KG/M2 | OXYGEN SATURATION: 95 % | TEMPERATURE: 97.1 F | WEIGHT: 196 LBS | HEIGHT: 65 IN

## 2023-07-25 DIAGNOSIS — M53.3 PAIN IN THE COCCYX: ICD-10-CM

## 2023-07-25 DIAGNOSIS — M54.42 ACUTE BILATERAL LOW BACK PAIN WITH LEFT-SIDED SCIATICA: ICD-10-CM
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
## 2023-07-25 DIAGNOSIS — M54.42 ACUTE BILATERAL LOW BACK PAIN WITH LEFT-SIDED SCIATICA: Primary | ICD-10-CM

## 2023-07-25 PROCEDURE — 99213 OFFICE O/P EST LOW 20 MIN: CPT | Performed by: FAMILY MEDICINE

## 2023-07-25 PROCEDURE — 72100 X-RAY EXAM L-S SPINE 2/3 VWS: CPT | Mod: TC | Performed by: RADIOLOGY

## 2023-07-25 PROCEDURE — 72220 X-RAY EXAM SACRUM TAILBONE: CPT | Mod: TC | Performed by: RADIOLOGY

## 2023-07-25 ASSESSMENT — PAIN SCALES - GENERAL: PAINLEVEL: MILD PAIN (2)
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
## 2023-07-27 ENCOUNTER — THERAPY VISIT (OUTPATIENT)
Dept: PHYSICAL THERAPY | Facility: CLINIC | Age: 68
End: 2023-07-27
Attending: FAMILY MEDICINE
Payer: COMMERCIAL

## 2023-07-27 DIAGNOSIS — M53.3 PAIN IN THE COCCYX: ICD-10-CM

## 2023-07-27 DIAGNOSIS — M54.42 ACUTE BILATERAL LOW BACK PAIN WITH LEFT-SIDED SCIATICA: ICD-10-CM

## 2023-07-27 PROCEDURE — 97161 PT EVAL LOW COMPLEX 20 MIN: CPT | Mod: GP | Performed by: PHYSICAL THERAPIST

## 2023-07-27 PROCEDURE — 97110 THERAPEUTIC EXERCISES: CPT | Mod: GP | Performed by: PHYSICAL THERAPIST
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
## 2023-08-01 ENCOUNTER — THERAPY VISIT (OUTPATIENT)
Dept: PHYSICAL THERAPY | Facility: CLINIC | Age: 68
End: 2023-08-01
Payer: COMMERCIAL

## 2023-08-01 ENCOUNTER — TRANSFERRED RECORDS (OUTPATIENT)
Dept: MULTI SPECIALTY CLINIC | Facility: CLINIC | Age: 68
End: 2023-08-01

## 2023-08-01 DIAGNOSIS — M54.42 ACUTE BILATERAL LOW BACK PAIN WITH LEFT-SIDED SCIATICA: Primary | ICD-10-CM

## 2023-08-01 LAB — RETINOPATHY: NORMAL

## 2023-08-01 PROCEDURE — 97140 MANUAL THERAPY 1/> REGIONS: CPT | Mod: GP | Performed by: PHYSICAL THERAPIST

## 2023-08-01 PROCEDURE — 97110 THERAPEUTIC EXERCISES: CPT | Mod: GP | Performed by: PHYSICAL THERAPIST

## 2023-08-12 ENCOUNTER — THERAPY VISIT (OUTPATIENT)
Dept: PHYSICAL THERAPY | Facility: CLINIC | Age: 68
End: 2023-08-12
Payer: COMMERCIAL

## 2023-08-12 DIAGNOSIS — M54.42 ACUTE BILATERAL LOW BACK PAIN WITH LEFT-SIDED SCIATICA: Primary | ICD-10-CM

## 2023-08-12 PROCEDURE — 97140 MANUAL THERAPY 1/> REGIONS: CPT | Mod: GP | Performed by: PHYSICAL THERAPIST

## 2023-08-12 PROCEDURE — 97110 THERAPEUTIC EXERCISES: CPT | Mod: GP | Performed by: PHYSICAL THERAPIST

## 2023-10-12 ENCOUNTER — OFFICE VISIT (OUTPATIENT)
Dept: SLEEP MEDICINE | Facility: CLINIC | Age: 68
End: 2023-10-12
Attending: FAMILY MEDICINE
Payer: COMMERCIAL

## 2023-10-12 VITALS
WEIGHT: 201.2 LBS | SYSTOLIC BLOOD PRESSURE: 133 MMHG | DIASTOLIC BLOOD PRESSURE: 82 MMHG | OXYGEN SATURATION: 95 % | HEART RATE: 81 BPM | BODY MASS INDEX: 32.33 KG/M2 | HEIGHT: 66 IN

## 2023-10-12 DIAGNOSIS — I10 PRIMARY HYPERTENSION: ICD-10-CM

## 2023-10-12 DIAGNOSIS — R06.83 SNORING: ICD-10-CM

## 2023-10-12 DIAGNOSIS — R29.818 SUSPECTED SLEEP APNEA: Primary | ICD-10-CM

## 2023-10-12 DIAGNOSIS — R06.81 WITNESSED APNEIC SPELLS: ICD-10-CM

## 2023-10-12 PROCEDURE — 99204 OFFICE O/P NEW MOD 45 MIN: CPT | Performed by: INTERNAL MEDICINE

## 2023-10-12 NOTE — PROGRESS NOTES
Sleep Consultation:    Date on this visit: 10/12/2023    Merline Max  is referred by Tamika Brown for a sleep consultation.     Primary Physician: Stephanie, Tamika     Merline Max reports frequent snoring for many years.     Her medical history is significant for hypertension and DM-2.     While undergoing colonoscopy, patient was noted to have oxygen saturations and apneas, concerning for underlying sleep apnea.    Merline does snore frequently. Patient's  does complain of snoring, and occasional gasping, and snorting. She does have witnessed apneas.They never sleep separately.  Patient sleeps on her side and occasionally on her back. She has occasional morning dry mouth and morning headaches, denies no restless legs.     Merline goes to sleep at 12:00 - 1:00 AM during the week. She wakes up at 8:00 AM. She falls asleep in 30- 60 minutes.  Merline has occasional difficulty falling asleep.  She wakes up 1 times a night for 10 minutes before falling back to sleep.  Merline wakes up to go to the bathroom.  Patient gets an average of 7 hours of sleep per night.     Merline denies any sleep walking, sleep talking, dream enactment, sleep paralysis, cataplexy, and hypnogogic/hypnopompic hallucinations.    Merline has difficulty breathing through her nose.      Patient's [place] Sleepiness score 5/24 consistent with no daytime sleepiness.      Merline naps 0 times per week. She takes some inadvertant naps.  She denies closing eyes, dozing, and falling asleep while driving. Patient was counseled on the importance of driving while alert, to pull over if drowsy, or nap before getting into the vehicle if sleepy.      She uses no caffeine.    Problem List:  Patient Active Problem List    Diagnosis Date Noted    Acute bilateral low back pain with left-sided sciatica 07/27/2023     Priority: Medium    Family history of premature coronary artery disease 01/01/2017     Priority: Medium    Advanced directives,  counseling/discussion 06/15/2016     Priority: Medium     Advance Care Planning 6/15/2016: Pt was given info on ADV. Ray Bell MA            CKD (chronic kidney disease) stage 3, GFR 30-59 ml/min (H) 06/15/2016     Priority: Medium    Acute iritis left eye 02/25/2013     Priority: Medium    Type 2 diabetes mellitus with stage 3a chronic kidney disease, without long-term current use of insulin (H) 07/16/2012     Priority: Medium    Blurred vision 07/16/2012     Priority: Medium     Diabetes      Gouty arthritis 07/16/2012     Priority: Medium    Essential hypertension, benign 07/16/2012     Priority: Medium    Hyperlipidemia LDL goal <100 07/16/2012     Priority: Medium    Fatigue 07/16/2012     Priority: Medium    Elevated LFTs 07/16/2012     Priority: Medium    Colon polyp 07/16/2012     Priority: Medium    Ocular hypertension 06/19/2012     Priority: Medium     high of 24, thick pachymetry      Dry eye syndrome 11/07/2010     Priority: Medium    Recurrent iritis right eye 11/07/2010     Priority: Medium    IgA nephropathy 07/16/1997     Priority: Medium        Past Medical/Surgical History:  Past Medical History:   Diagnosis Date    Arthritis     Fatigue 7/16/2012    Fracture     stress foot    Gout     Hyperlipidemia     Hypertension     Nephropathy 1998    IGA    Nonsenile cataract     Type 2 diabetes, HbA1c goal < 7% (H) 7/16/2012    Uveitis     iritis right eye since 2008     Social History     Tobacco Use    Smoking status: Never    Smokeless tobacco: Never    Tobacco comments:     Lives in smoke free household   Vaping Use    Vaping Use: Never used   Substance Use Topics    Alcohol use: Yes     Comment: two to three drinks a week    Drug use: No     Family History: 2 of her brothers have sleep apnea.     Physical Examination:  Vitals: Wt 91.3 kg (201 lb 3.2 oz)   BMI 33.48 kg/m    BMI= Body mass index is 33.48 kg/m .  GENERAL APPEARANCE: healthy, alert, and no distress  HENT: oropharynx  crowded  NEURO: mentation intact and speech normal  PSYCH: mentation appears normal and affect normal/bright  Mallampati Class: IV.  Tonsillar Stage: 1  hidden by pillars.    Impression/Plan:    Possible obstructive sleep apnea  Hypertension     Patient is a 59 years old female, with a BMI of 33, medical comorbidity of hypertension, diabetes, who presents with a history of frequent snoring, history of witnessed apneas, and daytime fatigue.  Oropharynx is Mallampati class IV on examination.  There is an intermediate risk for obstructive sleep apnea, and an overnight sleep study is recommended for further evaluation.    Plan:     Home sleep apnea testing    She will follow up with me in approximately two weeks after her sleep study has been competed to review the results and discuss plan of care.       Polysomnography & HST reviewed.  Limitations of HST reviewed.   Obstructive sleep apnea reviewed.  Complications of untreated sleep apnea were reviewed.    I spent a total of 45 minutes for this appointment on this date of service which include time spent before, during and after the visit for chart review, patient care, counseling and coordination of care.    Dr. Tang Salter       CC: Tamika Brown

## 2023-10-12 NOTE — NURSING NOTE
"Chief Complaint   Patient presents with    Sleep Problem     Stops breathing, snoring, dry mouth       Initial /82   Pulse 81   Ht 1.664 m (5' 5.5\")   Wt 91.3 kg (201 lb 3.2 oz)   SpO2 95%   BMI 32.97 kg/m   Estimated body mass index is 32.97 kg/m  as calculated from the following:    Height as of this encounter: 1.664 m (5' 5.5\").    Weight as of this encounter: 91.3 kg (201 lb 3.2 oz).    Medication Reconciliation: complete  ESS 5  Neck circumference:  45 centimeters.  Kym Patricia MA       "

## 2023-10-13 DIAGNOSIS — E11.22 TYPE 2 DIABETES MELLITUS WITH STAGE 3A CHRONIC KIDNEY DISEASE, WITHOUT LONG-TERM CURRENT USE OF INSULIN (H): ICD-10-CM

## 2023-10-13 DIAGNOSIS — N18.31 TYPE 2 DIABETES MELLITUS WITH STAGE 3A CHRONIC KIDNEY DISEASE, WITHOUT LONG-TERM CURRENT USE OF INSULIN (H): ICD-10-CM

## 2023-10-16 ENCOUNTER — TELEPHONE (OUTPATIENT)
Dept: GASTROENTEROLOGY | Facility: CLINIC | Age: 68
End: 2023-10-16
Payer: COMMERCIAL

## 2023-10-16 NOTE — TELEPHONE ENCOUNTER
"Endoscopy Scheduling Screen    Have you had a positive Covid test in the last 14 days?  No    Are you active on MyChart?   Yes    What insurance is in the chart?  Other:  [employer]     Ordering/Referring Provider:      YOVANY GARRISON      (If ordering provider performs procedure, schedule with ordering provider unless otherwise instructed. )    BMI: Estimated body mass index is 32.97 kg/m  as calculated from the following:    Height as of 10/12/23: 1.664 m (5' 5.5\").    Weight as of 10/12/23: 91.3 kg (201 lb 3.2 oz).     Sedation Ordered  moderate sedation.   If patient BMI > 50 do not schedule in [facility].    If patient BMI > 45 do not schedule at [facility].    Are you taking methadone or Suboxone?  No    Are you taking any prescription medications for pain 3 or more times per week?   No    Do you have a history of malignant hyperthermia or adverse reaction to anesthesia?  No    (Females) Are you currently pregnant?   No     Have you been diagnosed or told you have pulmonary hypertension?   No    Do you have an LVAD?  No    Have you been told you have moderate to severe sleep apnea?  No    Have you been told you have COPD, asthma, or any other lung disease?  No    Do you have any heart conditions?  No     Have you ever had an organ transplant?   No    Have you ever had or are you awaiting a heart or lung transplant?   No    Have you had a stroke or transient ischemic attack (TIA aka \"mini stroke\" in the last 6 months?   No    Have you been diagnosed with or been told you have cirrhosis of the liver?   No    Are you currently on dialysis?   No    Do you need assistance transferring?   No    BMI: Estimated body mass index is 32.97 kg/m  as calculated from the following:    Height as of 10/12/23: 1.664 m (5' 5.5\").    Weight as of 10/12/23: 91.3 kg (201 lb 3.2 oz).     Is patients BMI > 40 and scheduling location [facility]?  No    Do you take an injectable medication for weight loss or diabetes (excluding insulin)?  No    Do you take " the medication Naltrexone?  No    Do you take blood thinners?  No baby asprin       Prep   Are you currently on dialysis or do you have chronic kidney disease?  Yes ([facility] Prep)    Do you have a diagnosis of diabetes?  Yes (Golytely Prep)    Do you have a diagnosis of cystic fibrosis (CF)?  No    On a regular basis do you go 3 -5 days between bowel movements?  No    BMI > 40?  No    Preferred Pharmacy:    [id number] [employer] [place] [address]  [address] [facility] [address]  Phone: [phone] Fax: [phone]      Final Scheduling Details   Procedure scheduled  Flexible sigmoidoscopy    Surgeon:  Emma      Date of procedure:  01/24     Pre-OP / PAC:   No - Not required for this site.    Location  [facility] - [facility] - Per order.    Sedation   Moderate Sedation - Per order.      Patient Reminders:   You will receive a call from a Nurse to review instructions and health history.  This assessment must be completed prior to your procedure.  Failure to complete the Nurse assessment may result in the procedure being cancelled.      On the day of your procedure, please designate an adult(s) who can drive you home stay with you for the next 24 hours. The medicines used in the exam will make you sleepy. You will not be able to drive.      You cannot take public transportation, ride share services, or non-medical taxi service without a responsible caregiver.  Medical transport services are allowed with the requirement that a responsible caregiver will receive you at your destination.  We require that drivers and caregivers are confirmed prior to your procedure.

## 2023-11-03 ENCOUNTER — TELEPHONE (OUTPATIENT)
Dept: GASTROENTEROLOGY | Facility: CLINIC | Age: 68
End: 2023-11-03
Payer: COMMERCIAL

## 2023-11-03 NOTE — TELEPHONE ENCOUNTER
Caller: Merline    Reason for Reschedule/Cancellation (please be detailed, any staff messages or encounters to note?): Schedule conflict      Prior to reschedule please review:  Ordering Provider: YOVANY GARRISON   Sedation per order: Moderate  Does patient have any ASC Exclusions, please identify?: No      Notes on Cancelled Procedure:  Procedure: FLEXIBLE SIGMOIDOSCOPY [Flex Sig]   Date: 01/08/2024  Location: [facility] [place]; [address], [place] [address]  Surgeon: BLADIMIR      Rescheduled: Yes  Procedure: FLEXIBLE SIGMOIDOSCOPY [Flex Sig]   Date: 01/11/2024  Location: [facility]; [address], [place] [address]  Surgeon: BLADIMIR  Sedation Level Scheduled  Moderate,  Reason for Sedation Level Per Order  Prep/Instructions updated and sent: Yes, Brigid

## 2023-11-09 NOTE — PROGRESS NOTES
08/12/23 0500   Appointment Info   Signing clinician's name / credentials Rich Selby DPT   Total/Authorized Visits 8 E[employer]T   Visits Used 3   Medical Diagnosis Acute bilateral low back pain with left-sided sciatica  Pain in the coccyx   PT Tx Diagnosis low back pain with left sciatica   Quick Adds Certification   Progress Note/Certification   Start of Care Date 07/27/23   Onset of illness/injury or Date of Surgery 05/16/23   Therapy Frequency 1 x/ week   Predicted Duration 8 weeks   Certification date from 07/27/23   Certification date to 09/21/23   Progress Note Due Date 09/21/23   Progress Note Completed Date 07/27/23       Present No   GOALS   PT Goals 2   PT Goal 1   Goal Identifier bending   Goal Description pt can bend forward to pick things up off the floor 0/10 PL   Rationale to maximize safety and independence with performance of ADLs and functional tasks   Goal Progress bending hands hit ankle with 2/10 PL   Target Date 09/21/23   Subjective Report   Subjective Report pt reports her back is much better. now her pain just comes and goes. It getting better probably slower than she wants. was really sore after STM but no longer tender to the touch now.   Objective Measures   Objective Measures Objective Measure 1;Objective Measure 2;Objective Measure 3   Objective Measure 1   Objective Measure strength   Details hip ext 4/5 with pain, hip abd 4/5 with pain   Objective Measure 2   Objective Measure lumbar ROM   Details Lumbar flexion hands to ankle with  no effect on pain, Lumbar extension moderate limitataion with pull in poseterior left leg. right SGIS increase left low back pain pain , left SGIS WNL no pain.   Objective Measure 3   Objective Measure transition from sit to standing   Details 3-4/10 PL. and supine lying leg lift increases pain but passively no pain so more muscular possibly   Treatment Interventions (PT)   Interventions Therapeutic Procedure/Exercise;Manual Therapy    Therapeutic Procedure/Exercise   Therapeutic Procedures: strength, endurance, ROM, flexibillity minutes (82182) 30   PTRx Ther Proc 1 Double Knee to Chest   PTRx Ther Proc 1 - Details 10 x 5. sec    PTRx Ther Proc 2 Flexion in Sitting with Patient Overpressure/Progression to Knee Extension   PTRx Ther Proc 2 - Details No Notes   PTRx Ther Proc 3 Lumbar Flexion in Standing with/without Patient Overpressure   PTRx Ther Proc 3 - Details 10 x 5 sec.    PTRx Ther Proc 4 Prone On Elbows   PTRx Ther Proc 4 - Details made worse.    PTRx Ther Proc 5 Prone Press Ups   PTRx Ther Proc 5 - Details made worse    PTRx Ther Proc 6 Standing Hip Flexor Stretch   PTRx Ther Proc 6 - Details 3 x 30 sec.    PTRx Ther Proc 7 Standing Sideglide   PTRx Ther Proc 7 - Details 10 x 5 sec. pull on the left during after no change.  . educated if pain gets worse down left leg to flip to the other side to see if that reduce pain.    Skilled Intervention conintue with low back strengthening as pt making progress   Patient Response/Progress less pain with bending and flexion,   PTRx Ther Proc 8 Supine Abdominal Exercise #3 (Marching)   PTRx Ther Proc 8 - Details 3 x 20    PTRx Ther Proc 9 Prone Lumbar Extension Exercise #3 (Leg Extension)   PTRx Ther Proc 9 - Details 2 x 20    PTRx Ther Proc 10 Bridging   PTRx Ther Proc 10 - Details 2x 20    Manual Therapy   Manual Therapy: Mobilization, MFR, MLD, friction massage minutes (01269) 10   Manual Therapy 1 left erector spinae right at PSIS / illiac crest   Manual Therapy 1 - Details pt prone x 10 min   Skilled Intervention soft tissue mobilizatoin   Patient Response/Progress pt had no pain with supine after. better pt has theragun at home she can use.   Education   Learner/Method Patient;Pictures/Video;Demonstration;No Barriers to Learning   Plan   Home program continue repeated flexion and low back strengthening   Plan for next session continue with current treatment possible DC next visit.   Total  Session Time   Timed Code Treatment Minutes 40   Total Treatment Time (sum of timed and untimed services) 40       DISCHARGE  Reason for Discharge: Patient chooses to discontinue therapy.    Equipment Issued:     Discharge Plan: Patient to continue home program.    Referring Provider:  Tamika Brown

## 2023-11-12 DIAGNOSIS — E11.22 TYPE 2 DIABETES MELLITUS WITH STAGE 3A CHRONIC KIDNEY DISEASE, WITHOUT LONG-TERM CURRENT USE OF INSULIN (H): ICD-10-CM

## 2023-11-12 DIAGNOSIS — N18.31 TYPE 2 DIABETES MELLITUS WITH STAGE 3A CHRONIC KIDNEY DISEASE, WITHOUT LONG-TERM CURRENT USE OF INSULIN (H): ICD-10-CM

## 2023-11-12 NOTE — TELEPHONE ENCOUNTER
Please call pt  Needs to be seen for further refills  Please help her to schedule, I can than refill her medication    Tamika Brown MD

## 2023-11-13 NOTE — TELEPHONE ENCOUNTER
Patient scheduled for diabetes recheck on 12/19/23 at 11:30 with you.  Please send refills through.  Thank you!  Francheska SANDOVAL    [facility]

## 2023-12-02 ENCOUNTER — HEALTH MAINTENANCE LETTER (OUTPATIENT)
Age: 68
End: 2023-12-02

## 2023-12-04 ENCOUNTER — ANCILLARY PROCEDURE (OUTPATIENT)
Dept: MAMMOGRAPHY | Facility: CLINIC | Age: 68
End: 2023-12-04
Attending: FAMILY MEDICINE
Payer: COMMERCIAL

## 2023-12-04 DIAGNOSIS — Z12.31 VISIT FOR SCREENING MAMMOGRAM: ICD-10-CM

## 2023-12-04 PROCEDURE — 77063 BREAST TOMOSYNTHESIS BI: CPT | Mod: TC | Performed by: RADIOLOGY

## 2023-12-04 PROCEDURE — 77067 SCR MAMMO BI INCL CAD: CPT | Mod: TC | Performed by: RADIOLOGY

## 2023-12-14 ENCOUNTER — OFFICE VISIT (OUTPATIENT)
Dept: SLEEP MEDICINE | Facility: CLINIC | Age: 68
End: 2023-12-14
Payer: COMMERCIAL

## 2023-12-14 DIAGNOSIS — G47.33 OSA (OBSTRUCTIVE SLEEP APNEA): Primary | ICD-10-CM

## 2023-12-14 DIAGNOSIS — R06.81 WITNESSED APNEIC SPELLS: ICD-10-CM

## 2023-12-14 DIAGNOSIS — R29.818 SUSPECTED SLEEP APNEA: ICD-10-CM

## 2023-12-14 DIAGNOSIS — R06.83 SNORING: ICD-10-CM

## 2023-12-14 DIAGNOSIS — I10 PRIMARY HYPERTENSION: ICD-10-CM

## 2023-12-14 PROCEDURE — G0399 HOME SLEEP TEST/TYPE 3 PORTA: HCPCS | Performed by: INTERNAL MEDICINE

## 2023-12-15 ENCOUNTER — DOCUMENTATION ONLY (OUTPATIENT)
Dept: SLEEP MEDICINE | Facility: CLINIC | Age: 68
End: 2023-12-15
Payer: COMMERCIAL

## 2023-12-18 NOTE — PROGRESS NOTES
HST POST-STUDY QUESTIONNAIRE    What time did you go to bed?  11:30 pm  How long do you think it took to fall asleep?  1 hour  What time did you wake up to start the day?  7:30 am  Did you get up during the night at all?  Yes, bathroom 3:30 am.  If you woke up, do you remember approximately what time(s)? Woke up maybe 2 other times, don't know time.  Did you have any difficulty with the equipment?  No  Did you us any type of treatment with this study?  None  Was the head of the bed elevated? No  Did you sleep in a recliner?  No  Did you stop using CPAP at least 3 days before this test?  NA  Any other information you'd like us to know?

## 2023-12-18 NOTE — NURSING NOTE
Pt returned HST device. It was downloaded and forwarded data to the clinical specialist for scoring.  Angeles Guillen CMA on 12/18/2023 at 8:41 AM

## 2023-12-19 PROBLEM — G47.33 OSA (OBSTRUCTIVE SLEEP APNEA): Status: ACTIVE | Noted: 2023-12-19

## 2023-12-19 NOTE — PROGRESS NOTES
This HSAT was performed using a Noxturnal T3 device which recorded snore, sound, movement activity, body position, nasal pressure, oronasal thermal airflow, pulse, oximetry and both chest and abdominal respiratory effort. HSAT data was restricted to the time patient states they were in bed.     HSAT was scored using 1B 4% hypopnea rule.     HST AHI (Non-PAT): 75.9  Snoring was reported as loud.  Time with SpO2 below 89% was 124.4 minutes.   Overall signal quality was good.     Pt will follow up with sleep provider to determine appropriate therapy.

## 2023-12-19 NOTE — PROCEDURES
"HOME SLEEP STUDY INTERPRETATION        Patient: Merline Max  MRN: [id number]  YOB: 1955  Study Date: 12/14/2023  PCP/Referring Provider: Tamika Brown;   Ordering Provider: Tang Salter MD       Indications for Home Study: Merline Max is a 68 year old female who presents with symptoms suggestive of obstructive sleep apnea.    Estimated body mass index is 32.97 kg/m  as calculated from the following:    Height as of 10/12/23: 1.664 m (5' 5.5\").    Weight as of 10/12/23: 91.3 kg (201 lb 3.2 oz).  Total score - [place]: 5 (10/12/2023  2:00 PM)      Data: A full night home sleep study was performed recording the standard physiologic parameters including body position, movement, sound, nasal pressure, thermal oral airflow, chest and abdominal movements with respiratory inductance plethysmography, and oxygen saturation by pulse oximetry. Pulse rate was estimated by oximetry recording. This study was considered adequate based on > 4 hours of quality oximetry and respiratory recording. As specified by the AASM Manual for the Scoring of Sleep and Associated events, version 2.3, Rule VIII.D 1B, 4% oxygen desaturation scoring for hypopneas is used as a standard of care on all home sleep apnea testing.        Analysis Time:  441.5 minutes        Respiration:   Sleep Associated Hypoxemia: sustained hypoxemia was present. Baseline oxygen saturation was 98%.  Time with saturation less than or equal to 88% was 124.4 minutes. The lowest oxygen saturation was 65%.   Snoring: Snoring was present.  Respiratory events: The home study revealed a presence of 432 obstructive apneas and 27 mixed and central apneas. There were 98 hypopneas resulting in a combined apnea/hypopnea index [AHI] of 75.9 events per hour.  AHI was 106 per hour supine, N/A per hour prone, 67.5 per hour on left side, and 11.5 per hour on right side.   Pattern: Excluding events noted above, respiratory rate and pattern was " Normal.      Position: Percent of time spent: supine - 61.3%, prone - 0%, on left - 11.3%, on right - 27.2%.      Heart Rate: By pulse oximetry normal rate was noted.       Assessment:   Severe obstructive sleep apnea.  Sleep apnea events and oxygen desaturations were predominantly supine position related.  Sleep associated hypoxemia was present.    Recommendations:  CPAP therapy is recommended for treatment of severe obstructive sleep apnea.  Treatment can be initiated with auto titrating CPAP settings with pressure range of 5 to 15 cm H2O.  Recommend appropriate clinical follow-up in 1 to 3 months after starting treatment to monitor response, compliance and CPAP download data.  Suggest optimizing sleep hygiene and avoiding sleep deprivation.  Weight management.        Diagnosis Code(s): Obstructive Sleep Apnea G47.33, Hypoxemia G47.36    Electronically signed by: Tang Salter MD, December 19, 2023   Diplomate, American Board of Psychiatry and Neurology, Sleep Medicine

## 2023-12-27 ENCOUNTER — TELEPHONE (OUTPATIENT)
Dept: GASTROENTEROLOGY | Facility: CLINIC | Age: 68
End: 2023-12-27

## 2023-12-27 NOTE — TELEPHONE ENCOUNTER
Addendum:  Scoping provider and anes aware.  Ok to proceed at [facility] with CS per Dr Butler and Dr Michel.      Recently dxn with severe YANCI AHI 75.9.  msg sent to scoping provider and anes (scoping provider completed colonoscopy at [facility] 6/5/23 with CS).  -------------------------------------------------------------------------------  Pre visit planning completed.      Procedure details:    Patient scheduled for Flexible sigmoidoscopy  on 1/11/24.     Arrival time: 1205. Procedure time 1250    Pre op exam needed? N/A    Facility location: [facility]; [address], [place] [address]    Sedation type: Conscious sedation     Indication for procedure:     follow-up rectal polyp         Chart review:     Electronic implanted devices? No    Recent diagnosis of diverticulitis within the last 6 weeks? No    Diabetic? Yes. See medication holding recommendations     Diabetic medication HOLDING recommendations: (if applicable)  Oral diabetic medications: Yes:  Metformin (glucophage): HOLD day of procedure.  Diabetic injectables: No  Insulin: No      Medication review:    Anticoagulants? No    NSAIDS? No NSAID medications per patient's medication list.  RN will verify with pre-assessment call.    Other medication HOLDING recommendations:  N/A      Prep for procedure:     Bowel prep recommendation: Enemas   Due to:  standard bowel prep.    Prep instructions sent via op5       Corinne Kliber, RN  Endoscopy Procedure Pre Assessment RN  [phone] option 4

## 2023-12-28 NOTE — TELEPHONE ENCOUNTER
Attempted to contact patient in order to complete pre assessment questions.     No answer. Left message to return call to [phone] option 4    Missed call communication sent via [employer].    Corinne Kliber, RN  Endoscopy Procedure Pre Assessment RN  [phone] option 4

## 2023-12-29 NOTE — TELEPHONE ENCOUNTER
Pre assessment completed for upcoming procedure.   (Please see previous telephone encounter notes for complete details)    Patient  returned call.       Procedure details:    Arrival time and facility location reviewed.    Pre op exam needed? N/A    Designated  policy reviewed. Instructed to have someone stay 6 hours post procedure.     COVID policy reviewed.      Medication review:    Oral diabetic medication(s): Metformin (glucophage): HOLD day of procedure.  NSAID medication(s): Ibuprofen (Advil, Motrin): HOLD 1 day before procedure.      Prep for procedure:     Procedure prep instructions reviewed.        Additional information needed?  N/A      Patient  verbalized understanding and had no questions or concerns at this time.      Tiffany Huang RN  Endoscopy Procedure Pre Assessment RN  [phone] option 4

## 2024-01-03 DIAGNOSIS — E11.22 TYPE 2 DIABETES MELLITUS WITH STAGE 3A CHRONIC KIDNEY DISEASE, WITHOUT LONG-TERM CURRENT USE OF INSULIN (H): ICD-10-CM

## 2024-01-03 DIAGNOSIS — N18.31 TYPE 2 DIABETES MELLITUS WITH STAGE 3A CHRONIC KIDNEY DISEASE, WITHOUT LONG-TERM CURRENT USE OF INSULIN (H): ICD-10-CM

## 2024-01-05 RX ORDER — NALOXONE HYDROCHLORIDE 0.4 MG/ML
0.4 INJECTION, SOLUTION INTRAMUSCULAR; INTRAVENOUS; SUBCUTANEOUS
Status: CANCELLED | OUTPATIENT
Start: 2024-01-05

## 2024-01-05 RX ORDER — NALOXONE HYDROCHLORIDE 0.4 MG/ML
0.2 INJECTION, SOLUTION INTRAMUSCULAR; INTRAVENOUS; SUBCUTANEOUS
Status: CANCELLED | OUTPATIENT
Start: 2024-01-05

## 2024-01-05 RX ORDER — PROCHLORPERAZINE MALEATE 5 MG
5 TABLET ORAL EVERY 6 HOURS PRN
Status: CANCELLED | OUTPATIENT
Start: 2024-01-05

## 2024-01-05 RX ORDER — ONDANSETRON 2 MG/ML
4 INJECTION INTRAMUSCULAR; INTRAVENOUS EVERY 6 HOURS PRN
Status: CANCELLED | OUTPATIENT
Start: 2024-01-05

## 2024-01-05 RX ORDER — ONDANSETRON 4 MG/1
4 TABLET, ORALLY DISINTEGRATING ORAL EVERY 6 HOURS PRN
Status: CANCELLED | OUTPATIENT
Start: 2024-01-05

## 2024-01-05 RX ORDER — FLUMAZENIL 0.1 MG/ML
0.2 INJECTION, SOLUTION INTRAVENOUS
Status: CANCELLED | OUTPATIENT
Start: 2024-01-05 | End: 2024-01-06

## 2024-01-10 ENCOUNTER — OFFICE VISIT (OUTPATIENT)
Dept: FAMILY MEDICINE | Facility: CLINIC | Age: 69
End: 2024-01-10
Payer: COMMERCIAL

## 2024-01-10 ENCOUNTER — ANCILLARY PROCEDURE (OUTPATIENT)
Dept: GENERAL RADIOLOGY | Facility: CLINIC | Age: 69
End: 2024-01-10
Attending: FAMILY MEDICINE
Payer: COMMERCIAL

## 2024-01-10 ENCOUNTER — ANESTHESIA EVENT (OUTPATIENT)
Dept: SURGERY | Facility: AMBULATORY SURGERY CENTER | Age: 69
End: 2024-01-10
Payer: COMMERCIAL

## 2024-01-10 VITALS
DIASTOLIC BLOOD PRESSURE: 86 MMHG | OXYGEN SATURATION: 96 % | WEIGHT: 205 LBS | HEART RATE: 80 BPM | HEIGHT: 66 IN | BODY MASS INDEX: 32.95 KG/M2 | SYSTOLIC BLOOD PRESSURE: 135 MMHG | TEMPERATURE: 98 F | RESPIRATION RATE: 16 BRPM

## 2024-01-10 DIAGNOSIS — K63.5 POLYP OF COLON, UNSPECIFIED PART OF COLON, UNSPECIFIED TYPE: ICD-10-CM

## 2024-01-10 DIAGNOSIS — E78.5 HYPERLIPIDEMIA LDL GOAL <100: ICD-10-CM

## 2024-01-10 DIAGNOSIS — M25.531 RIGHT WRIST PAIN: ICD-10-CM

## 2024-01-10 DIAGNOSIS — Z20.822 EXPOSURE TO 2019 NOVEL CORONAVIRUS: ICD-10-CM

## 2024-01-10 DIAGNOSIS — N18.31 TYPE 2 DIABETES MELLITUS WITH STAGE 3A CHRONIC KIDNEY DISEASE, WITHOUT LONG-TERM CURRENT USE OF INSULIN (H): Primary | ICD-10-CM

## 2024-01-10 DIAGNOSIS — M10.9 GOUTY ARTHRITIS: ICD-10-CM

## 2024-01-10 DIAGNOSIS — E11.22 TYPE 2 DIABETES MELLITUS WITH STAGE 3A CHRONIC KIDNEY DISEASE, WITHOUT LONG-TERM CURRENT USE OF INSULIN (H): Primary | ICD-10-CM

## 2024-01-10 DIAGNOSIS — I10 ESSENTIAL HYPERTENSION, BENIGN: ICD-10-CM

## 2024-01-10 DIAGNOSIS — N18.31 STAGE 3A CHRONIC KIDNEY DISEASE (H): ICD-10-CM

## 2024-01-10 LAB
ANION GAP SERPL CALCULATED.3IONS-SCNC: 14 MMOL/L (ref 7–15)
BUN SERPL-MCNC: 33.1 MG/DL (ref 8–23)
CALCIUM SERPL-MCNC: 10.3 MG/DL (ref 8.8–10.2)
CHLORIDE SERPL-SCNC: 107 MMOL/L (ref 98–107)
CHOLEST SERPL-MCNC: 170 MG/DL
CREAT SERPL-MCNC: 1.44 MG/DL (ref 0.51–0.95)
CREAT UR-MCNC: 46.3 MG/DL
DEPRECATED HCO3 PLAS-SCNC: 21 MMOL/L (ref 22–29)
EGFRCR SERPLBLD CKD-EPI 2021: 39 ML/MIN/1.73M2
ERYTHROCYTE [DISTWIDTH] IN BLOOD BY AUTOMATED COUNT: 14 % (ref 10–15)
FASTING STATUS PATIENT QL REPORTED: YES
GLUCOSE SERPL-MCNC: 125 MG/DL (ref 70–99)
HBA1C MFR BLD: 6.3 % (ref 0–5.6)
HCT VFR BLD AUTO: 35.4 % (ref 35–47)
HDLC SERPL-MCNC: 39 MG/DL
HGB BLD-MCNC: 11.5 G/DL (ref 11.7–15.7)
LDLC SERPL CALC-MCNC: 71 MG/DL
MCH RBC QN AUTO: 31 PG (ref 26.5–33)
MCHC RBC AUTO-ENTMCNC: 32.5 G/DL (ref 31.5–36.5)
MCV RBC AUTO: 95 FL (ref 78–100)
MICROALBUMIN UR-MCNC: 621 MG/L
MICROALBUMIN/CREAT UR: 1341.25 MG/G CR (ref 0–25)
NONHDLC SERPL-MCNC: 131 MG/DL
PLATELET # BLD AUTO: 99 10E3/UL (ref 150–450)
POTASSIUM SERPL-SCNC: 4.3 MMOL/L (ref 3.4–5.3)
RBC # BLD AUTO: 3.71 10E6/UL (ref 3.8–5.2)
SODIUM SERPL-SCNC: 142 MMOL/L (ref 135–145)
TRIGL SERPL-MCNC: 301 MG/DL
WBC # BLD AUTO: 3.8 10E3/UL (ref 4–11)

## 2024-01-10 PROCEDURE — 36415 COLL VENOUS BLD VENIPUNCTURE: CPT | Performed by: FAMILY MEDICINE

## 2024-01-10 PROCEDURE — 82043 UR ALBUMIN QUANTITATIVE: CPT | Performed by: FAMILY MEDICINE

## 2024-01-10 PROCEDURE — 85027 COMPLETE CBC AUTOMATED: CPT | Performed by: FAMILY MEDICINE

## 2024-01-10 PROCEDURE — 83036 HEMOGLOBIN GLYCOSYLATED A1C: CPT | Performed by: FAMILY MEDICINE

## 2024-01-10 PROCEDURE — 73110 X-RAY EXAM OF WRIST: CPT | Mod: TC | Performed by: RADIOLOGY

## 2024-01-10 PROCEDURE — 99214 OFFICE O/P EST MOD 30 MIN: CPT | Performed by: FAMILY MEDICINE

## 2024-01-10 PROCEDURE — 82570 ASSAY OF URINE CREATININE: CPT | Performed by: FAMILY MEDICINE

## 2024-01-10 PROCEDURE — 80061 LIPID PANEL: CPT | Performed by: FAMILY MEDICINE

## 2024-01-10 PROCEDURE — 80048 BASIC METABOLIC PNL TOTAL CA: CPT | Performed by: FAMILY MEDICINE

## 2024-01-10 ASSESSMENT — PAIN SCALES - GENERAL: PAINLEVEL: MILD PAIN (2)

## 2024-01-10 NOTE — ANESTHESIA PREPROCEDURE EVALUATION
Anesthesia Pre-Procedure Evaluation    Patient: Merline Max   MRN: [id number] : 1955        Procedure : Procedure(s):  Sigmoidoscopy flexible          Past Medical History:   Diagnosis Date    Arthritis     Fatigue 2012    Fracture     stress foot    Gout     Hyperlipidemia     Hypertension     Nephropathy     IGA    Nonsenile cataract     YANCI (obstructive sleep apnea) 2023    Type 2 diabetes, HbA1c goal < 7% (H) 2012    Uveitis 11-    iritis right eye since       Past Surgical History:   Procedure Laterality Date    ABDOMEN SURGERY  , , , , ,    3 C-Sections, Total Hysterectomy, Gallbladder Removal,    BIOPSY KIDNEY  1996    Ig A nephropathy    CHOLECYSTECTOMY  1996    COLONOSCOPY  2013    Procedure: COLONOSCOPY;  COLONOSCOPY - POLYP/ SANTILLI;  Surgeon: Ulices Gregory MD;  Location: [facility] OR    COLONOSCOPY  10/01/2013    Procedure: COMBINED COLONOSCOPY, SINGLE BIOPSY/POLYPECTOMY BY BIOPSY;;  Surgeon: Ulices Gregory MD;  Location: [facility] OR    COLONOSCOPY N/A 2014    Procedure: COMBINED COLONOSCOPY, SINGLE OR MULTIPLE BIOPSY/POLYPECTOMY BY BIOPSY;  Surgeon: Ulices Gregory MD;  Location: [facility] OR    COLONOSCOPY N/A 2023    Procedure: COLONOSCOPY, FLEXIBLE, WITH LESION REMOVAL USING SNARE;  Surgeon: Vicki Butler DO;  Location: [facility] OR    COLONOSCOPY WITH CO2 INSUFFLATION N/A 2014    Procedure: COLONOSCOPY WITH CO2 INSUFFLATION;  Surgeon: Ulices Gregory MD;  Location: [facility] OR    COLONOSCOPY WITH CO2 INSUFFLATION N/A 2020    Procedure: COLONOSCOPY, WITH CO2 INSUFFLATION;  Surgeon: Eliud Huang DO;  Location: [facility] OR    COLONOSCOPY WITH CO2 INSUFFLATION N/A 2023    Procedure: Colonoscopy with CO2 insufflation;  Surgeon: Vicki Butler DO;  Location: [facility] OR    FOOT SURGERY      Lt foot.     GYN SURGERY       x3    HERNIORRHAPHY UMBILICAL  1996    HYSTERECTOMY TOTAL ABDOMINAL,  BILATERAL SALPINGO-OOPHORECTOMY, COMBINED  01/01/1996    cyst, endometriosis    HYSTERECTOMY, PAP NO LONGER INDICATED        No Known Allergies   Social History     Tobacco Use    Smoking status: Never    Smokeless tobacco: Never    Tobacco comments:     Lives in smoke free household   Substance Use Topics    Alcohol use: Yes     Comment: two to three drinks a week      Wt Readings from Last 1 Encounters:   01/10/24 93 kg (205 lb)           Physical Exam    Airway        Mallampati: III   TM distance: > 3 FB   Neck ROM: full   Mouth opening: > 3 cm    Respiratory Devices and Support         Dental       (+) Modest Abnormalities - crowns, retainers, 1 or 2 missing teeth      Cardiovascular   cardiovascular exam normal          Pulmonary   pulmonary exam normal                OUTSIDE LABS:  CBC:   Lab Results   Component Value Date    WBC 3.8 (L) 01/10/2024    WBC 3.7 (L) 04/11/2023    HGB 11.5 (L) 01/10/2024    HGB 12.0 04/11/2023    HCT 35.4 01/10/2024    HCT 37.1 04/11/2023    PLT 99 (L) 01/10/2024     (L) 04/11/2023     BMP:   Lab Results   Component Value Date     06/27/2023     04/11/2023    POTASSIUM 4.2 06/27/2023    POTASSIUM 4.0 04/11/2023    CHLORIDE 104 06/27/2023    CHLORIDE 112 (H) 04/11/2023    CO2 26 06/27/2023    CO2 27 04/11/2023    BUN 27.7 (H) 06/27/2023    BUN 32 (H) 04/11/2023    CR 1.25 (H) 06/27/2023    CR 1.14 (H) 04/11/2023     (H) 06/27/2023     (H) 06/05/2023     COAGS:   Lab Results   Component Value Date    PTT 34 12/08/2013    INR 1.03 12/08/2013     POC:   Lab Results   Component Value Date     (H) 02/14/2020     HEPATIC:   Lab Results   Component Value Date    ALBUMIN 3.9 07/15/2020    PROTTOTAL 8.1 07/15/2020    ALT 79 (H) 07/15/2020    AST 64 (H) 07/15/2020    ALKPHOS 84 07/15/2020    BILITOTAL 0.6 07/15/2020    BILIDIRECT 0.1 10/04/2011     OTHER:   Lab Results   Component Value Date    A1C 6.3 (H) 01/10/2024    MONE 9.9 06/27/2023    LIPASE  "292 (H) 06/19/2012    TSH 4.37 (H) 01/09/2020    T4 1.01 01/09/2020       Anesthesia Plan    ASA Status:  3    NPO Status:  NPO Appropriate    Anesthesia Type: MAC.     - Reason for MAC: straight local not clinically adequate   Induction: Intravenous, Propofol.   Maintenance: TIVA.        Consents    Anesthesia Plan(s) and associated risks, benefits, and realistic alternatives discussed. Questions answered and patient/representative(s) expressed understanding.     - Discussed:     - Discussed with:  Patient, Spouse      - Extended Intubation/Ventilatory Support Discussed: No.      - Patient is DNR/DNI Status: No     Use of blood products discussed: No .     Postoperative Care       PONV prophylaxis: Ondansetron (or other 5HT-3), Background Propofol Infusion     Comments:               Alberto Douglas MD    I have reviewed the pertinent notes and labs in the chart from the past 30 days and (re)examined the patient.  Any updates or changes from those notes are reflected in this note.             # Thrombocytopenia: Lowest platelets = 99 in last 2 days, will monitor for bleeding  # Obesity: Estimated body mass index is 33.09 kg/m  as calculated from the following:    Height as of 1/10/24: 1.676 m (5' 6\").    Weight as of 1/10/24: 93 kg (205 lb).    "

## 2024-01-10 NOTE — PROGRESS NOTES
"  Assessment & Plan     Type 2 diabetes mellitus with stage 3a chronic kidney disease, without long-term current use of insulin (H)  Well controlled on meds  - metFORMIN (GLUCOPHAGE) 500 MG tablet; TAKE 2 TABLETS (1,000MG) BY MOUTH TWICE A DAY WITH MEALS  - **Hemoglobin A1c FUTURE 3mo; Future  - **Basic metabolic panel FUTURE 2mo; Future  - Albumin Random Urine Quantitative with Creat Ratio; Future  - **CBC with platelets FUTURE 2mo; Future  - Lipid panel reflex to direct LDL Fasting; Future  - **Hemoglobin A1c FUTURE 3mo  - **Basic metabolic panel FUTURE 2mo  - Albumin Random Urine Quantitative with Creat Ratio  - **CBC with platelets FUTURE 2mo  - Lipid panel reflex to direct LDL Fasting    Stage 3a chronic kidney disease (H)  Seeing nephrologist  Okay to continue metformin at this time    Essential hypertension, benign  At goal on meds    Hyperlipidemia LDL goal <100  LDL at goal    Exposure to 2019 novel coronavirus  Going on cruise. Recent exposure from grandson. Would like rx in case tests positive  Is going to bring meds on trip  - nirmatrelvir and ritonavir (PAXLOVID) 150 mg/100 mg therapy pack; Take 2 tablets by mouth 2 times daily for 5 days    Right wrist pain  No fracture noted, follow-up post cruise if still painful  - XR Wrist Right G/E 3 Views; Future    Gouty arthritis  Per nephrology    Polyp of colon, unspecified part of colon, unspecified type  Has follow-up colonoscopy tomorrow             BMI:   Estimated body mass index is 33.09 kg/m  as calculated from the following:    Height as of this encounter: 1.676 m (5' 6\").    Weight as of this encounter: 93 kg (205 lb).   Weight management plan: Discussed healthy diet and exercise guidelines        Tamika Brown MD  [facility]    Haily Rick is a 68 year old, presenting for the following health issues:  Diabetes      1/10/2024     7:56 AM   Additional Questions   Roomed by marko       History of Present Illness " "      Diabetes:   She presents for follow up of diabetes.    She is not checking blood glucose.         She has no concerns regarding her diabetes at this time.  She is having numbness in feet.  The patient has had a diabetic eye exam in the last 12 months. Eye exam performed on august 2023. Location of last eye exam [facility].        She eats 2-3 servings of fruits and vegetables daily.She consumes 0 sweetened beverage(s) daily.She exercises with enough effort to increase her heart rate 10 to 19 minutes per day.  She exercises with enough effort to increase her heart rate 3 or less days per week.   She is taking medications regularly.     Fell 3 days ago,right wrist pain and bruising, has swelling.  Iced and tension bandage for 1 day  Hit it on edge of closet when she was falling. Hurts over unlnar side of wrist.   Has some swelling    Going on a [email or web address] cruise next week    Diabetes and BP well controlled  On aspirin, statin and arb  Utd with eye exam    Dentist later today  Colonoscopy tomorrow              Review of Systems   Constitutional, HEENT, cardiovascular, pulmonary, gi and gu systems are negative, except as otherwise noted.      Objective    /86   Pulse 80   Temp 98  F (36.7  C) (Oral)   Resp 16   Ht 1.676 m (5' 6\")   Wt 93 kg (205 lb)   SpO2 96%   BMI 33.09 kg/m    Body mass index is 33.09 kg/m .  Physical Exam   GENERAL: healthy, alert and no distress  NECK: no adenopathy, no asymmetry, masses, or scars and thyroid normal to palpation  RESP: lungs clear to auscultation - no rales, rhonchi or wheezes  CV: regular rate and rhythm, normal S1 S2, no S3 or S4, no murmur, click or rub, no peripheral edema and peripheral pulses strong  ABDOMEN: soft, nontender, no hepatosplenomegaly, no masses and bowel sounds normal  MS: no gross musculoskeletal defects noted, no edema, bruising noted on ulnar side of right wrist with some ptp over the same area    Xray - Reviewed and interpreted " by me.  Right wrist: no fracture noted

## 2024-01-11 ENCOUNTER — ANESTHESIA (OUTPATIENT)
Dept: SURGERY | Facility: AMBULATORY SURGERY CENTER | Age: 69
End: 2024-01-11
Payer: COMMERCIAL

## 2024-01-11 ENCOUNTER — HOSPITAL ENCOUNTER (OUTPATIENT)
Facility: AMBULATORY SURGERY CENTER | Age: 69
Discharge: HOME | End: 2024-01-11
Attending: INTERNAL MEDICINE
Payer: COMMERCIAL

## 2024-01-11 VITALS — HEART RATE: 76 BPM

## 2024-01-11 VITALS
RESPIRATION RATE: 16 BRPM | SYSTOLIC BLOOD PRESSURE: 130 MMHG | TEMPERATURE: 97.3 F | OXYGEN SATURATION: 97 % | DIASTOLIC BLOOD PRESSURE: 71 MMHG

## 2024-01-11 LAB
FLEXIBLE SIGMOIDOSCOPY: NORMAL
GLUCOSE BLDC GLUCOMTR-MCNC: 115 MG/DL (ref 70–99)
GLUCOSE BLDC GLUCOMTR-MCNC: 125 MG/DL (ref 70–99)

## 2024-01-11 PROCEDURE — 45330 DIAGNOSTIC SIGMOIDOSCOPY: CPT

## 2024-01-11 PROCEDURE — G8907 PT DOC NO EVENTS ON DISCHARG: HCPCS

## 2024-01-11 PROCEDURE — G8918 PT W/O PREOP ORDER IV AB PRO: HCPCS

## 2024-01-11 RX ORDER — PROPOFOL 10 MG/ML
INJECTION, EMULSION INTRAVENOUS PRN
Status: DISCONTINUED | OUTPATIENT
Start: 2024-01-11 | End: 2024-01-11

## 2024-01-11 RX ORDER — SODIUM CHLORIDE, SODIUM LACTATE, POTASSIUM CHLORIDE, CALCIUM CHLORIDE 600; 310; 30; 20 MG/100ML; MG/100ML; MG/100ML; MG/100ML
INJECTION, SOLUTION INTRAVENOUS CONTINUOUS
Status: DISCONTINUED | OUTPATIENT
Start: 2024-01-11 | End: 2024-01-12 | Stop reason: HOSPADM

## 2024-01-11 RX ORDER — ONDANSETRON 2 MG/ML
4 INJECTION INTRAMUSCULAR; INTRAVENOUS
Status: DISCONTINUED | OUTPATIENT
Start: 2024-01-11 | End: 2024-01-12 | Stop reason: HOSPADM

## 2024-01-11 RX ORDER — LIDOCAINE HYDROCHLORIDE 20 MG/ML
INJECTION, SOLUTION INFILTRATION; PERINEURAL PRN
Status: DISCONTINUED | OUTPATIENT
Start: 2024-01-11 | End: 2024-01-11

## 2024-01-11 RX ORDER — LIDOCAINE 40 MG/G
CREAM TOPICAL
Status: DISCONTINUED | OUTPATIENT
Start: 2024-01-11 | End: 2024-01-12 | Stop reason: HOSPADM

## 2024-01-11 RX ADMIN — PROPOFOL 20 MG: 10 INJECTION, EMULSION INTRAVENOUS at 13:20

## 2024-01-11 RX ADMIN — LIDOCAINE HYDROCHLORIDE 60 MG: 20 INJECTION, SOLUTION INFILTRATION; PERINEURAL at 13:18

## 2024-01-11 RX ADMIN — SODIUM CHLORIDE, SODIUM LACTATE, POTASSIUM CHLORIDE, CALCIUM CHLORIDE: 600; 310; 30; 20 INJECTION, SOLUTION INTRAVENOUS at 12:26

## 2024-01-11 RX ADMIN — PROPOFOL 100 MG: 10 INJECTION, EMULSION INTRAVENOUS at 13:18

## 2024-01-11 NOTE — ANESTHESIA POSTPROCEDURE EVALUATION
Patient: Merline Max    Procedure: Procedure(s):  Sigmoidoscopy flexible       Anesthesia Type:  MAC    Note:  Disposition: Outpatient   Postop Pain Control: Uneventful            Sign Out: Well controlled pain   PONV: No   Neuro/Psych: Uneventful            Sign Out: Acceptable/Baseline neuro status   Airway/Respiratory: Uneventful            Sign Out: Acceptable/Baseline resp. status   CV/Hemodynamics: Uneventful            Sign Out: Acceptable CV status; No obvious hypovolemia; No obvious fluid overload   Other NRE:    DID A NON-ROUTINE EVENT OCCUR?            Last vitals:  Vitals Value Taken Time   /71 01/11/24 1345   Temp 97.3  F (36.3  C) 01/11/24 1327   Pulse     Resp 16 01/11/24 1345   SpO2 97 % 01/11/24 1345       Electronically Signed By: Alberto Douglas MD  January 11, 2024  2:45 PM

## 2024-01-11 NOTE — ANESTHESIA CARE TRANSFER NOTE
Patient: Merline Max    Procedure: Procedure(s):  Sigmoidoscopy flexible       Diagnosis: Polyp of colon, unspecified part of colon, unspecified type [K63.5]  Diagnosis Additional Information: No value filed.    Anesthesia Type:   MAC     Note:    Oropharynx: oropharynx clear of all foreign objects and spontaneously breathing  Level of Consciousness: drowsy  Oxygen Supplementation: room air    Independent Airway: airway patency satisfactory and stable  Dentition: dentition unchanged  Vital Signs Stable: post-procedure vital signs reviewed and stable  Report to RN Given: handoff report given  Patient transferred to: Phase II    Handoff Report: Identifed the Patient, Identified the Reponsible Provider, Reviewed the pertinent medical history, Discussed the surgical course, Reviewed Intra-OP anesthesia mangement and issues during anesthesia, Set expectations for post-procedure period and Allowed opportunity for questions and acknowledgement of understanding      Vitals:  Vitals Value Taken Time   BP     Temp     Pulse     Resp     SpO2         Electronically Signed By: DEDE Dawn CRNA  January 11, 2024  1:26 PM

## 2024-01-11 NOTE — H&P
[facility] Anesthesia Pre-op History and Physical    Merlien Max MRN# [id number]   Age: 68 year old YOB: 1955            Date of Exam 1/11/2024         Primary care provider: Tamika Brown         Chief Complaint and/or Reason for Procedure:     Follow-up rectal polyp - TVA with piecemeal resection 6 months ago         Active problem list:     Patient Active Problem List    Diagnosis Date Noted    YANCI (obstructive sleep apnea) 12/19/2023     Priority: Medium     Severe YANCI      Acute bilateral low back pain with left-sided sciatica 07/27/2023     Priority: Medium    Family history of premature coronary artery disease 01/01/2017     Priority: Medium    Advanced directives, counseling/discussion 06/15/2016     Priority: Medium     Advance Care Planning 6/15/2016: Pt was given info on ADV. Ray Bell MA            CKD (chronic kidney disease) stage 3, GFR 30-59 ml/min (H) 06/15/2016     Priority: Medium    Acute iritis left eye 02/25/2013     Priority: Medium    Type 2 diabetes mellitus with stage 3a chronic kidney disease, without long-term current use of insulin (H) 07/16/2012     Priority: Medium    Blurred vision 07/16/2012     Priority: Medium     Diabetes      Gouty arthritis 07/16/2012     Priority: Medium    Essential hypertension, benign 07/16/2012     Priority: Medium    Hyperlipidemia LDL goal <100 07/16/2012     Priority: Medium    Fatigue 07/16/2012     Priority: Medium    Elevated LFTs 07/16/2012     Priority: Medium    Colon polyp 07/16/2012     Priority: Medium    Ocular hypertension 06/19/2012     Priority: Medium     high of 24, thick pachymetry      Dry eye syndrome 11/07/2010     Priority: Medium    Recurrent iritis right eye 11/07/2010     Priority: Medium    IgA nephropathy 07/16/1997     Priority: Medium            Medications (include herbals and vitamins):   Any Plavix use in the last 7 days? No     Current Outpatient Medications   Medication Sig    allopurinol  (ZYLOPRIM) 100 MG tablet Take 200 mg by mouth daily.    Aspirin (ASPIR-81 PO) Take 1 tablet by mouth daily.    blood glucose (BEATRIZ CONTOUR) test strip Three times daily    cetirizine (ZYRTEC) 10 MG tablet Take 10 mg by mouth daily.    docusate sodium (COLACE) 100 MG capsule Take 1 capsule by mouth as needed.    hydrochlorothiazide (MICROZIDE) 12.5 MG capsule Take 1 capsule by mouth daily.    irbesartan (AVAPRO) 300 MG tablet Take 300 mg by mouth At Bedtime.    ketotifen (ZADITOR) 0.025 % SOLN 1 drop as needed.    Lancet Devices (AUTO-LANCET) MISC Three times daily    lancets thin MISC 1 each 2 times daily.    metFORMIN (GLUCOPHAGE) 500 MG tablet TAKE 2 TABLETS (1,000MG) BY MOUTH TWICE A DAY WITH MEALS    methylcellulose, laxative, (CITRUCEL) POWD Take 1 ounce at bedtime PRN.    Multiple Vitamin (MULTIVITAMIN ADULT) TABS     nirmatrelvir and ritonavir (PAXLOVID) 150 mg/100 mg therapy pack Take 2 tablets by mouth 2 times daily for 5 days    polyethylene glycol - propylene glycol (SYSTANE) 0.4-0.3 % SOLN Place 1 drop into both eyes 2 times daily as needed.    simvastatin (ZOCOR) 20 MG tablet Take 20 mg by mouth At Bedtime.     Current Facility-Administered Medications   Medication    lactated ringers infusion    lidocaine (LMX4) kit    lidocaine 1 % 0.1-1 mL    ondansetron (ZOFRAN) injection 4 mg    sodium chloride (PF) 0.9% PF flush 3 mL    sodium chloride (PF) 0.9% PF flush 3 mL             Allergies:    No Known Allergies  Allergy to Latex? No  Allergy to tape?   No  Intolerances:             Physical Exam:   All vitals have been reviewed  Patient Vitals for the past 8 hrs:   BP Temp Temp src Resp SpO2   01/11/24 1214 (!) 148/81 97.1  F (36.2  C) Temporal 16 95 %     No intake/output data recorded.  Lungs:   No increased work of breathing, good air exchange, clear to auscultation bilaterally, no crackles or wheezing     Cardiovascular:   normal S1 and S2             Lab / Radiology Results:            Anesthetic  risk and/or ASA classification:     2  Vicki Butler, DO

## 2024-02-26 ENCOUNTER — OFFICE VISIT (OUTPATIENT)
Dept: SLEEP MEDICINE | Facility: CLINIC | Age: 69
End: 2024-02-26
Payer: COMMERCIAL

## 2024-02-26 VITALS
OXYGEN SATURATION: 96 % | HEIGHT: 66 IN | SYSTOLIC BLOOD PRESSURE: 115 MMHG | BODY MASS INDEX: 33.04 KG/M2 | HEART RATE: 77 BPM | WEIGHT: 205.6 LBS | DIASTOLIC BLOOD PRESSURE: 76 MMHG

## 2024-02-26 DIAGNOSIS — G47.33 OSA (OBSTRUCTIVE SLEEP APNEA): Primary | ICD-10-CM

## 2024-02-26 DIAGNOSIS — R06.83 SNORING: ICD-10-CM

## 2024-02-26 DIAGNOSIS — G47.19 EXCESSIVE DAYTIME SLEEPINESS: ICD-10-CM

## 2024-02-26 PROCEDURE — 99203 OFFICE O/P NEW LOW 30 MIN: CPT | Performed by: INTERNAL MEDICINE

## 2024-02-26 ASSESSMENT — SLEEP AND FATIGUE QUESTIONNAIRES
HOW LIKELY ARE YOU TO NOD OFF OR FALL ASLEEP WHILE LYING DOWN TO REST IN THE AFTERNOON WHEN CIRCUMSTANCES PERMIT: SLIGHT CHANCE OF DOZING
HOW LIKELY ARE YOU TO NOD OFF OR FALL ASLEEP WHILE SITTING QUIETLY AFTER LUNCH WITHOUT ALCOHOL: WOULD NEVER DOZE
HOW LIKELY ARE YOU TO NOD OFF OR FALL ASLEEP WHILE SITTING AND READING: SLIGHT CHANCE OF DOZING
HOW LIKELY ARE YOU TO NOD OFF OR FALL ASLEEP IN A CAR, WHILE STOPPED FOR A FEW MINUTES IN TRAFFIC: WOULD NEVER DOZE
HOW LIKELY ARE YOU TO NOD OFF OR FALL ASLEEP WHILE SITTING INACTIVE IN A PUBLIC PLACE: WOULD NEVER DOZE
HOW LIKELY ARE YOU TO NOD OFF OR FALL ASLEEP WHEN YOU ARE A PASSENGER IN A CAR FOR AN HOUR WITHOUT A BREAK: SLIGHT CHANCE OF DOZING
HOW LIKELY ARE YOU TO NOD OFF OR FALL ASLEEP WHILE SITTING AND TALKING TO SOMEONE: WOULD NEVER DOZE
HOW LIKELY ARE YOU TO NOD OFF OR FALL ASLEEP WHILE WATCHING TV: SLIGHT CHANCE OF DOZING

## 2024-02-26 NOTE — PROGRESS NOTES
[facility]   [facility]  Outpatient Sleep Medicine Consultation  February 26, 2024    Name: Merline Max MRN# [id number]   Age: 68 year old YOB: 1955     Date of Consultation: February 26, 2024  Consultation is requested by: No referring provider defined for this encounter.  Primary care provider: Tamika Brown  Merline Max is a 68 year old female              Assessment and Plan:   Mandi, 68 years old female with underlying history of hypertension, type 2 diabetes, chronic kidney disease stage III was diagnosed with severe obstructive sleep apnea with an AHI of 75.9 events per hour.  She was started on auto titrating CPAP 5-15 cm of water which she has been using since December with overall excellent compliance.  Her 95th percentile pressures are around 14.5 cm of water with residual respiratory events just under 8 per hour.  This is consistent with successful therapy but also indicate likely, high opening pressure.  Sleep disordered breathing.  Severe obstructive sleep apnea, currently on auto titrating CPAP 5-15 cm of water with overall excellent compliance.  Her 95th percentile pressures are just under 15 cm of water and she does have a slightly elevated residual apnea-hypopnea index.  PAP interface.  She is currently using a large face mask and struggling with optimum fit.  So far she is trying to make it really tight which leads to occasional leakage.  Excessive daytime sleepiness.  The subjective daytime sleepiness has significantly improved with ongoing PAP therapy.      Comorbid Diagnoses:    Class II obesity.  Hypertension.  Chronic kidney disease stage III.    Summary Recommendations:    Increase auto titrating CPAP to 8-18 cm of water.  Patient is encouraged to continue the excellent PAP compliance.  Refit PAP interface.  Consider using a medium size fullface mask.    Summary Counseling:  Follow-up in 1 year.  Patient is advised to  "reach out earlier if needed.    Check out [email or web address]           History of Present Illness:     Merline Max is a 68 year old female with underlying history of class II obesity, hypertension, type 2 diabetes, chronic kidney disease stage III.  Mandi underwent a type III home sleep study on December 14, 2023.  The study which had an analysis time of 441.5 minutes revealed an apnea-hypopnea index of 75.9 events per hour.  These events were predominantly obstructive in nature with few mixed events.  Her supine AHI was 106/hour.  She was prescribed and has been using an auto titrating CPAP 5-15 cm of water.  She has a large fullface mask and as of right now has been using the device every night.  We discussed in detail regarding his current therapy and her ongoing issues with the mask.    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  She usually goes to bed between 11:30 PM to 12 AM.  She does not have much trouble falling asleep.  During the night, more recently she is waking up once or twice to use the bathroom.  Prior to the PAP therapy she had multiple nighttime arousals and had trouble falling back to sleep.  This has resolved since then.  Although she is struggling with the fitting of the mask but clearly notices the significant improvement in her the quality of her sleep.    PREVIOUS SLEEP STUDIES: Home sleep study  Date: 12/14/2023  AHI: 75.9 / hour  Intervention: auto-CPAP 5-15 cm H2O  Sleep Architecture:    CURRENT THERAPY-Subjective:  Sleep wake schedule:   Bedtime 12 AM  Awakening 8 AM without using alarm. She wakes 1-2/night to micturate  Awakenings for 2-5 minutes 1-2 per night/week  Naps : 0     Overall, she rates the experience with PAP as 8 (0 poor, 10 great). The mask is not comfortable.  The mask is uncomfortable because of \"it is usually too tight\". She ocassionally having significant oral/nasal dryness. The pressure is comfortable.     Her PAP interface " is Full Face Mask.    She does feel rested in the morning.    Total score - [place]: 5 (10/12/2023  2:00 PM)  JUSTINA Total Score: 6    Objective:  CPAP Compliance Targets:   >70% days > 4 hours AHI < 5   30 days ending February 26, 2024   [employer]     Auto-PAP 5.0 - 15.0 cmH2O 30 day usage data:    83% of days with > 4 hours of use. 4/30 days with no use.   Average use 357 minutes per day.   95%ile Leak 9.69 L/min.   CPAP 95% pressure 14.9 cm.   AHI 7.95 events per hour.             Medications:     Current Outpatient Medications   Medication Sig    allopurinol (ZYLOPRIM) 100 MG tablet Take 200 mg by mouth daily.    Aspirin (ASPIR-81 PO) Take 1 tablet by mouth daily.    blood glucose (BEATRIZ CONTOUR) test strip Three times daily    cetirizine (ZYRTEC) 10 MG tablet Take 10 mg by mouth daily.    docusate sodium (COLACE) 100 MG capsule Take 1 capsule by mouth as needed.    hydrochlorothiazide (MICROZIDE) 12.5 MG capsule Take 1 capsule by mouth daily.    irbesartan (AVAPRO) 300 MG tablet Take 300 mg by mouth At Bedtime.    ketotifen (ZADITOR) 0.025 % SOLN 1 drop as needed.    Lancet Devices (AUTO-LANCET) MISC Three times daily    lancets thin MISC 1 each 2 times daily.    metFORMIN (GLUCOPHAGE) 500 MG tablet TAKE 2 TABLETS (1,000MG) BY MOUTH TWICE A DAY WITH MEALS    methylcellulose, laxative, (CITRUCEL) POWD Take 1 ounce at bedtime PRN.    Multiple Vitamin (MULTIVITAMIN ADULT) TABS     polyethylene glycol - propylene glycol (SYSTANE) 0.4-0.3 % SOLN Place 1 drop into both eyes 2 times daily as needed.    simvastatin (ZOCOR) 20 MG tablet Take 20 mg by mouth At Bedtime.     No current facility-administered medications for this visit.        No Known Allergies         Past Medical History:     Does not need 02 supplement at night   Past Medical History:   Diagnosis Date    Arthritis     Fatigue 7/16/2012    Fracture     stress foot    Gout     Hyperlipidemia     Hypertension     Nephropathy 1998    IGA    Nonsenile cataract      YANCI (obstructive sleep apnea) 2023    Type 2 diabetes, HbA1c goal < 7% (H) 2012    Uveitis     iritis right eye since              Past Surgical History:    No h/o  upper airway surgery  Past Surgical History:   Procedure Laterality Date    ABDOMEN SURGERY  , , , , ,    3 C-Sections, Total Hysterectomy, Gallbladder Removal,    BIOPSY KIDNEY  1996    Ig A nephropathy    CHOLECYSTECTOMY  1996    COLONOSCOPY  2013    Procedure: COLONOSCOPY;  COLONOSCOPY - POLYP/ SANTILLI;  Surgeon: Ulices Gregory MD;  Location: [facility]    COLONOSCOPY  10/01/2013    Procedure: COMBINED COLONOSCOPY, SINGLE BIOPSY/POLYPECTOMY BY BIOPSY;;  Surgeon: Ulices Gregory MD;  Location: [facility]    COLONOSCOPY N/A 2014    Procedure: COMBINED COLONOSCOPY, SINGLE OR MULTIPLE BIOPSY/POLYPECTOMY BY BIOPSY;  Surgeon: Ulices Gregory MD;  Location: [facility]    COLONOSCOPY N/A 2023    Procedure: COLONOSCOPY, FLEXIBLE, WITH LESION REMOVAL USING SNARE;  Surgeon: Vicki Butler DO;  Location: [facility]    COLONOSCOPY WITH CO2 INSUFFLATION N/A 2014    Procedure: COLONOSCOPY WITH CO2 INSUFFLATION;  Surgeon: Ulices Gregory MD;  Location: [facility]    COLONOSCOPY WITH CO2 INSUFFLATION N/A 2020    Procedure: COLONOSCOPY, WITH CO2 INSUFFLATION;  Surgeon: Eliud Huang DO;  Location: [facility]    COLONOSCOPY WITH CO2 INSUFFLATION N/A 2023    Procedure: Colonoscopy with CO2 insufflation;  Surgeon: Vicki Butler DO;  Location: [facility]    FOOT SURGERY      Lt foot.     GYN SURGERY       x3    HERNIORRHAPHY UMBILICAL  1996    HYSTERECTOMY TOTAL ABDOMINAL, BILATERAL SALPINGO-OOPHORECTOMY, COMBINED  1996    cyst, endometriosis    HYSTERECTOMY, PAP NO LONGER INDICATED      SIGMOIDOSCOPY FLEXIBLE N/A 2024    Procedure: Sigmoidoscopy flexible;  Surgeon: Vicki Butler DO;  Location: [facility]                      Physical Examination:     Objective  "  Reported vitals:  /76   Pulse 77   Ht 1.664 m (5' 5.51\")   Wt 93.3 kg (205 lb 9.6 oz)   SpO2 96%   BMI 33.68 kg/m     HE ENT: Pupils are equal and reactive bilaterally, normocephalic atraumatic, micrognathia, Mallampati score: 4.  Psych: Alert and oriented times 3; coherent speech, normal   rate and volume, able to articulate logical thoughts, able   to abstract reason, no tangential thoughts, no hallucinations   or delusions  His affect is normal.    Total of 33 of time was spent with patient, this included the interview and exam, and review of the chart/labs/imaging/sleep study/PAP therapy data on 02/26/2024 . Greater than 50% of which was spent counseling and coordinating care.     Copy to: Tamika Brown MD 2/26/2024     [facility]   [address], [address]   [address] [place], [place] [address]. [place] [address]   Appointments: [phone]    "

## 2024-02-26 NOTE — PATIENT INSTRUCTIONS
Your BMI is Body mass index is 33.68 kg/m .    What is BMI?  Body mass index (BMI) is one way to tell whether you are at a healthy weight, overweight, or obese. It measures your weight in relation to your height.  A BMI of 18.5 to 24.9 is in the healthy range. A person with a BMI of 25 to 29.9 is considered overweight, and someone with a BMI of 30 or greater is considered obese.  Another way to find out if you are at risk for health problems caused by overweight and obesity is to measure your waist. If you are a woman and your waist is more than 35 inches, or if you are a man and your waist is more than 40 inches, your risk of disease may be higher.  More than two-thirds of American adults are considered overweight or obese. Being overweight or obese increases the risk for further weight gain.  Excess weight may lead to heart disease and diabetes. Creating and following plans for healthy eating and physical activity may help you improve your health.    Methods for maintaining or losing weight.  Weight control is part of healthy lifestyle and includes exercise, emotional health, and healthy eating habits.  Careful eating habits lifelong is the mainstay of weight control.  Though there are significant health benefits from weight loss, long-term weight loss with diet alone may be very difficult to achieve- studies show long-term success with dietary management in less than 10% of people. Attaining a healthy weight may be especially difficult to achieve in those with severe obesity. In some cases, medications, devices and surgical management might be considered.    What can you do?  If you are overweight or obese and are interested in methods for weight loss, you should discuss this with your provider. In addition, we recommend that you review healthy life styles and methods for weight loss available through the National Institutes of Health patient information sites:  "  http://win.niddk.nih.gov/publications/index.htm              MY TREATMENT INFORMATION FOR SLEEP APNEA-  Merline Max    DOCTOR : Manisha Poe MD    Am I having a sleep study at a sleep center?  --->Due to normal delays, you will be contacted within 2-4 weeks to schedule    Am I having a home sleep study?  --->Watch the video for the device you are using:    -/drop off device-   https://www.AdMobilize.com/watch?v=yGGFBdELGhk    -Disposable device sent out require phone/computer application-   https://www.AdMobilize.com/watch?v=BCce_vbiwxE      Frequently asked questions:  1. What is Obstructive Sleep Apnea (YANCI)? YANCI is the most common type of sleep apnea. Apnea means, \"without breath.\"  Apnea is most often caused by narrowing or collapse of the upper airway as muscles relax during sleep.   Almost everyone has occasional apneas. Most people with sleep apnea have had brief interruptions at night frequently for many years.  The severity of sleep apnea is related to how frequent and severe the events are.   2. What are the consequences of YANCI? Symptoms include: feeling sleepy during the day, snoring loudly, gasping or stopping of breathing, trouble sleeping, and occasionally morning headaches or heartburn at night.  Sleepiness can be serious and even increase the risk of falling asleep while driving. Other health consequences may include development of high blood pressure and other cardiovascular disease in persons who are susceptible. Untreated YANCI  can contribute to heart disease, stroke and diabetes.   3. What are the treatment options? In most situations, sleep apnea is a lifelong disease that must be managed with daily therapy. Medications are not effective for sleep apnea and surgery is generally not considered until other therapies have been tried. Your treatment is your choice . Continuous Positive Airway (CPAP) works right away and is the therapy that is effective in nearly everyone. An oral device to " hold your jaw forward is usually the next most reliable option. Other options include postioning devices (to keep you off your back), weight loss, and surgery including a tongue pacing device. There is more detail about some of these options below.  4. Are my sleep studies covered by insurance? Although we will request verification of coverage, we advise you also check in advance of the study to ensure there is coverage.    Important tips for those choosing CPAP and similar devices  For new devices, sign up for device RONALD to monitor your device for your followup visits  We encourage you to utilize the Link[employer]ycle ronald or website (myAir web (resmed.com) ) to monitor your therapy progress and share the data with your healthcare team when you discuss your sleep apnea.                                                    Know your equipment:  CPAP is continuous positive airway pressure that prevents obstructive sleep apnea by keeping the throat from collapsing while you are sleeping. In most cases, the device is  smart  and can slowly self-adjusts if your throat collapses and keeps a record every day of how well you are treated-this information is available to you and your care team.  BPAP is bilevel positive airway pressure that keeps your throat open and also assists each breath with a pressure boost to maintain adequate breathing.  Special kinds of BPAP are used in patients who have inadequate breathing from lung or heart disease. In most cases, the device is  smart  and can slowly self-adjusts to assist breathing. Like CPAP, the device keeps a record of how well you are treated.  Your mask is your connection to the device. You get to choose what feels most comfortable and the staff will help to make sure if fits. Here: are some examples of the different masks that are available: Magnetic mask aids may assist with use but there are safety issues that should be addressed when considering with magnets* ( see end of  discussion).       Key points to remember on your journey with sleep apnea:  Sleep study.  PAP devices often need to be adjusted during a sleep study to show that they are effective and adjusted right.  Good tips to remember: Try wearing just the mask during a quiet time during the day so your body adapts to wearing it. A humidifier is recommended for comfort in most cases to prevent drying of your nose and throat. Allergy medication from your provider may help you if you are having nasal congestion.  Getting settled-in. It takes more than one night for most of us to get used to wearing a mask. Try wearing just the mask during a quiet time during the day so your body adapts to wearing it. A humidifier is recommended for comfort in most cases. Our team will work with you carefully on the first day and will be in contact within 4 days and again at 2 and 4 weeks for advice and remote device adjustments. Your therapy is evaluated by the device each day.   Use it every night. The more you are able to sleep naturally for 7-8 hours, the more likely you will have good sleep and to prevent health risks or symptoms from sleep apnea. Even if you use it 4 hours it helps. Occasionally all of us are unable to use a medical therapy, in sleep apnea, it is not dangerous to miss one night.   Communicate. Call our skilled team on the number provided on the first day if your visit for problems that make it difficult to wear the device. Over 2 out of 3 patients can learn to wear the device long-term with help from our team. Remember to call our team or your sleep providers if you are unable to wear the device as we may have other solutions for those who cannot adapt to mask CPAP therapy. It is recommended that you sleep your sleep provider within the first 3 months and yearly after that if you are not having problems.   Use it for your health. We encourage use of CPAP masks during daytime quiet periods to allow your face and brain to  adapt to the sensation of CPAP so that it will be a more natural sensation to awaken to at night or during naps. This can be very useful during the first few weeks or months of adapting to CPAP though it does not help medically to wear CPAP during wakefulness and  should not be used as a strategy just to meet guidelines.  Take care of your equipment. Make sure you clean your mask and tubing using directions every day and that your filter and mask are replaced as recommended or if they are not working.     *Masks with magnets:  Updated Contraindications  Masks with magnetic components are contraindicated for use by patients where they, or anyone in close physical contact while using the mask, have the following:   Active medical implants that interact with magnets (i.e., pacemakers, implantable cardioverter defibrillators (ICD), neurostimulators, cerebrospinal fluid (CSF) shunts, insulin/infusion pumps)   Metallic implants/objects containing ferromagnetic material (i.e., aneurysm clips/flow disruption devices, embolic coils, stents, valves, electrodes, implants to restore hearing or balance with implanted magnets, ocular implants, metallic splinters in the eye)  Updated Warning  Keep the mask magnets at a safe distance of at least 6 inches (150 mm) away from implants or medical devices that may be adversely affected by magnetic interference. This warning applies to you or anyone in close physical contact with your mask. The magnets are in the frame and lower headgear clips, with a magnetic field strength of up to 400mT. When worn, they connect to secure the mask but may inadvertently detach while asleep.  Implants/medical devices, including those listed within contraindications, may be adversely affected if they change function under external magnetic fields or contain ferromagnetic materials that attract/repel to magnetic fields (some metallic implants, e.g., contact lenses with metal, dental implants, metallic  cranial plates, screws, rosanna hole covers, and bone substitute devices). Consult your physician and  of your implant / other medical device for information on the potential adverse effects of magnetic fields.    BESIDES CPAP, WHAT OTHER THERAPIES ARE THERE?    Positioning Device  Positioning devices are generally used when sleep apnea is mild and only occurs on your back.This example shows a pillow that straps around the waist. It may be appropriate for those whose sleep study shows milder sleep apnea that occurs primarily when lying flat on one's back. Preliminary studies have shown benefit but effectiveness at home may need to be verified by a home sleep test. These devices are generally not covered by medical insurance.  Examples of devices that maintain sleeping on the back to prevent snoring and mild sleep apnea.    Belt type body positioner  http://HacemeUnRegalo.com/    Electronic reminder  http://nightshifttherapy.com/            Oral Appliance  What is oral appliance therapy?  An oral appliance device fits on your teeth at night like a retainer used after having braces. The device is made by a specialized dentist and requires several visits over 1-2 months before a manufactured device is made to fit your teeth and is adjusted to prevent your sleep apnea. Once an oral device is working properly, snoring should be improved. A home sleep test may be recommended at that time if to determine whether the sleep apnea is adequately treated.       Some things to remember:  -Oral devices are often, but not always, covered by your medical insurance. Be sure to check with your insurance provider.   -If you are referred for oral therapy, you will be given a list of specialized dentists to consider or you may choose to visit the Web site of the American Academy of Dental Sleep Medicine  -Oral devices are less likely to work if you have severe sleep apnea or are extremely overweight.     More detailed information  An  oral appliance is a small acrylic device that fits over the upper and lower teeth  (similar to a retainer or a mouth guard). This device slightly moves jaw forward, which moves the base of the tongue forward, opens the airway, improves breathing for effective treat snoring and obstructive sleep apnea in perhaps 7 out of 10 people .  The best working devices are custom-made by a dental device  after a mold is made of the teeth 1, 2, 3.  When is an oral appliance indicated?  Oral appliance therapy is recommended as a first-line treatment for patients with primary snoring, mild sleep apnea, and for patients with moderate sleep apnea who prefer appliance therapy to use of CPAP4, 5. Severity of sleep apnea is determined by sleep testing and is based on the number of respiratory events per hour of sleep.   How successful is oral appliance therapy?  The success rate of oral appliance therapy in patients with mild sleep apnea is 75-80% while in patients with moderate sleep apnea it is 50-70%. The chance of success in patients with severe sleep apnea is 40-50%. The research also shows that oral appliances have a beneficial effect on the cardiovascular health of YANCI patients at the same magnitude as CPAP therapy7.  Oral appliances should be a second-line treatment in cases of severe sleep apnea, but if not completely successful then a combination therapy utilizing CPAP plus oral appliance therapy may be effective. Oral appliances tend to be effective in a broad range of patients although studies show that the patients who have the highest success are females, younger patients, those with milder disease, and less severe obesity. 3, 6.   Finding a dentist that practices dental sleep medicine  Specific training is available through the American Academy of Dental Sleep Medicine for dentists interested in working in the field of sleep. To find a dentist who is educated in the field of sleep and the use of oral  appliances, near you, visit the Web site of the American Academy of Dental Sleep Medicine.    References  1. Jabier, et al. Objectively measured vs self-reported compliance during oral appliance therapy for sleep-disordered breathing. Chest 2013; 144(5): 5098-9752.  2. Eliceo et al. Objective measurement of compliance during oral appliance therapy for sleep-disordered breathing. Thorax 2013; 68(1): 91-96.  3. Josie et al. Mandibular advancement devices in 620 men and women with YANCI and snoring: tolerability and predictors of treatment success. Chest 2004; 125: 0767-3506.  4. Dameon, et al. Oral appliances for snoring and YANCI: a review. Sleep 2006; 29: 244-262.  5. González et al. Oral appliance treatment for YANCI: an update. J Clin Sleep Med 2014; 10(2): 215-227.  6. Dulce Maria et al. Predictors of OSAH treatment outcome. J Dent Res 2007; 86: 6276-3924.      Weight Loss:   Your Body mass index is 33.68 kg/m .    Being overweight does not necessarily mean you will have health consequences.  Those who have BMI over 35 or over 27 with existing medical conditions carries greater risk.   Weight loss decreases severity of sleep apnea in most people with obesity. For those with mild obesity who have developed snoring with weight gain, even 15-30 pound weight loss can improve and occasionally milder eliminate sleep apnea.  Structured and life-long dietary and health habits are necessary to lose weight and keep healthier weight levels.     The Comprehensive Weight loss program offers all aspects of weight loss strategies including two Non-Surgical Weight Loss Programs: Medical Weight Management and our 24 Week Healthy Lifestyle Program:    Medical Weight Management: You will meet with a Medical Weight Management Provider, as well as a Registered Dietician. The program may include medication therapy, dietary education, recommended exercise and physical therapy programs, monthly support group meetings, and  possible psychological counseling. Follow up visits with the provider or dietician are scheduled based on your progress and needs.    24 Week Healthy Lifestyle Program: This unique program is designed to give you the support of weekly appointments and activities thru a 24-week period. It may include all of the components of the basic program (above), with the addition of 11 individual Health  Visits, 24-week access to the [employer] website for over 700 online classes, and monthly support group meetings. This program has an out-of-pocket expense of $499 to cover the items that can not be billed to insurance (health coaches and [employer] access), and is non-refundable/non-transferable (you may be able to use a Health Savings Account; ask your HSA provider). There may be an optional meal replacement plan prescribed as well.   Surgical management achieves meaningful long-term weight loss and improvement in health risks in most patients with more severe obesity.      Sleep Apnea Surgery:    Surgery for obstructive sleep apnea is considered generally only when other therapies fail to work. Surgery may be discussed with you if you are having a difficult time tolerating CPAP and or when there is an abnormal structure that requires surgical correction.  Nose and throat surgeries often enlarge the airway to prevent collapse.  Most of these surgeries create pain for 1-2 weeks and up to half of the most common surgeries are not effective throughout life.  You should carefully discuss the benefits and drawbacks to surgery with your sleep provider and surgeon to determine if it is the best solution for you.   More information  Surgery for YANCI is directed at areas that are responsible for narrowing or complete obstruction of the airway during sleep.  There are a wide range of procedures available to enlarge and/or stabilize the airway to prevent blockage of breathing in the three major areas where it can occur: the palate,  tongue, and nasal regions.  Successful surgical treatment depends on the accurate identification of the factors responsible for obstructive sleep apnea in each person.  A personalized approach is required because there is no single treatment that works well for everyone.  Because of anatomic variation, consultation with an examination by a sleep surgeon is a critical first step in determining what surgical options are best for each patient.  In some cases, examination during sedation may be recommended in order to guide the selection of procedures.  Patients will be counseled about risks and benefits as well as the typical recovery course after surgery. Surgery is typically not a cure for a person s YANCI.  However, surgery will often significantly improve one s YANCI severity (termed  success rate ).  Even in the absence of a cure, surgery will decrease the cardiovascular risk associated with OSA7; improve overall quality of life8 (sleepiness, functionality, sleep quality, etc).      Palate Procedures:  Patients with YANCI often have narrowing of their airway in the region of their tonsils and uvula.  The goals of palate procedures are to widen the airway in this region as well as to help the tissues resist collapse.  Modern palate procedure techniques focus on tissue conservation and soft tissue rearrangement, rather than tissue removal.  Often the uvula is preserved in this procedure. Residual sleep apnea is common in patient after pharyngoplasty with an average reduction in sleep apnea events of 33%2.      Tongue Procedures:  ExamWhile patients are awake, the muscles that surround the throat are active and keep this region open for breathing. These muscles relax during sleep, allowing the tongue and other structures to collapse and block breathing.  There are several different tongue procedures available.  Selection of a tongue base procedure depends on characteristics seen on physical exam.  Generally, procedures are  aimed at removing bulky tissues in this area or preventing the back of the tongue from falling back during sleep.  Success rates for tongue surgery range from 50-62%3.    Hypoglossal Nerve Stimulation:  Hypoglossal nerve stimulation has recently received approval from the [place] Food and Drug Administration for the treatment of obstructive sleep apnea.  This is based on research showing that the system was safe and effective in treating sleep apnea6.  Results showed that the median AHI score decreased 68%, from 29.3 to 9.0. This therapy uses an implant system that senses breathing patterns and delivers mild stimulation to airway muscles, which keeps the airway open during sleep.  The system consists of three fully implanted components: a small generator (similar in size to a pacemaker), a breathing sensor, and a stimulation lead.  Using a small handheld remote, a patient turns the therapy on before bed and off upon awakening.    Candidates for this device must be greater than 18 years of age, have moderate to severe obstructive sleep apnea with less than 25% central events  (AHI between 15-65), BMI less than 35, have tried CPAP/oral appliance for at least 8 weeks without success, and have appropriate upper airway anatomy (determined by a sleep endoscopy performed by Dr. Saman Andrew or Dr. Clarke Bush).    Nasal Procedures:  Nasal obstruction can interfere with nasal breathing during the day and night.  Studies have shown that relief of nasal obstruction can improve the ability of some patients to tolerate positive airway pressure therapy for obstructive sleep apnea1.  Treatment options include medications such as nasal saline, topical corticosteroid and antihistamine sprays, and oral medications such as antihistamines or decongestants. Non-surgical treatments can include external nasal dilators for selected patients. If these are not successful by themselves, surgery can improve the nasal airway either  alone or in combination with these other options.        Combination Procedures:  Combination of surgical procedures and other treatments may be recommended, particularly if patients have more than one area of narrowing or persistent positional disease.  The success rate of combination surgery ranges from 66-80%2,3.    References  Jose MORILLO. The Role of the Nose in Snoring and Obstructive Sleep Apnoea: An Update.  Eur Arch Otorhinolaryngol. 2011; 268: 1365-73.   Allegra SM; Amee JA; Zahida JR; Pallanch JF; Luis MB; Lou SG; Fuad UREÑA. Surgical modifications of the upper airway for obstructive sleep apnea in adults: a systematic review and meta-analysis. SLEEP 2010;33(10):7097-5318. Cata HARRINGTON. Hypopharyngeal surgery in obstructive sleep apnea: an evidence-based medicine review.  Arch Otolaryngol Head Neck Surg. 2006 Feb;132(2):206-13.  Willian YH1, Jaya Y, Juan ASHVIN. The efficacy of anatomically based multilevel surgery for obstructive sleep apnea. Otolaryngol Head Neck Surg. 2003 Oct;129(4):327-35.  Kezirian E, Goldberg A. Hypopharyngeal Surgery in Obstructive Sleep Apnea: An Evidence-Based Medicine Review. Arch Otolaryngol Head Neck Surg. 2006 Feb;132(2):206-13.  Mark PJ et al. Upper-Airway Stimulation for Obstructive Sleep Apnea.  N Engl J Med. 2014 Jan 9;370(2):139-49.  Jodi Y et al. Increased Incidence of Cardiovascular Disease in Middle-aged Men with Obstructive Sleep Apnea. Am J Respir Crit Care Med; 2002 166: 159-165  Araya EM et al. Studying Life Effects and Effectiveness of Palatopharyngoplasty (SLEEP) study: Subjective Outcomes of Isolated Uvulopalatopharyngoplasty. Otolaryngol Head Neck Surg. 2011; 144: 623-631.        WHAT IF I ONLY HAVE SNORING?    Mandibular advancement devices, lateral sleep positioning, long-term weight loss and treatment of nasal allergies have been shown to improve snoring.  Exercising tongue muscles with a game  (https://Roojoom.1CLICK.Multichannel/us/ronald/soundly-reduce-snoring/vl2250408298) or stimulating the tongue during the day with a device (https://doi.org/10.3390/qgt29376197) have improved snoring in some individuals.  https://www.Mister Spex.Multichannel/  https://www.sleepfoundation.org/best-anti-snoring-mouthpieces-and-mouthguards    Remember to Drive Safe... Drive Alive     Sleep health profoundly affects your health, mood, and your safety.  Thirty three percent of the population (one in three of us) is not getting enough sleep and many have a sleep disorder. Not getting enough sleep or having an untreated / undertreated sleep condition may make us sleepy without even knowing it. In fact, our driving could be dramatically impaired due to our sleep health. As your provider, here are some things I would like you to know about driving:     Here are some warning signs for impairment and dangerous drowsy driving:              -Having been awake more than 16 hours               -Looking tired               -Eyelid drooping              -Head nodding (it could be too late at this point)              -Driving for more than 30 minutes     Some things you could do to make the driving safer if you are experiencing some drowsiness:              -Stop driving and rest              -Call for transportation              -Make sure your sleep disorder is adequately treated     Some things that have been shown NOT to work when experiencing drowsiness while driving:              -Turning on the radio              -Opening windows              -Eating any  distracting  /  entertaining  foods (e.g., sunflower seeds, candy, or any other)              -Talking on the phone      Your decision may not only impact your life, but also the life of others. Please, remember to drive safe for yourself and all of us.

## 2024-03-20 ENCOUNTER — PATIENT OUTREACH (OUTPATIENT)
Dept: CARE COORDINATION | Facility: CLINIC | Age: 69
End: 2024-03-20
Payer: COMMERCIAL

## 2024-04-03 ENCOUNTER — PATIENT OUTREACH (OUTPATIENT)
Dept: CARE COORDINATION | Facility: CLINIC | Age: 69
End: 2024-04-03
Payer: COMMERCIAL

## 2024-05-08 ENCOUNTER — MYC MEDICAL ADVICE (OUTPATIENT)
Dept: FAMILY MEDICINE | Facility: CLINIC | Age: 69
End: 2024-05-08
Payer: COMMERCIAL

## 2024-06-19 ENCOUNTER — DOCUMENTATION ONLY (OUTPATIENT)
Dept: FAMILY MEDICINE | Facility: CLINIC | Age: 69
End: 2024-06-19
Payer: COMMERCIAL

## 2024-06-19 DIAGNOSIS — M10.9 GOUTY ARTHRITIS: ICD-10-CM

## 2024-06-19 DIAGNOSIS — E11.22 TYPE 2 DIABETES MELLITUS WITH STAGE 3A CHRONIC KIDNEY DISEASE, WITHOUT LONG-TERM CURRENT USE OF INSULIN (H): ICD-10-CM

## 2024-06-19 DIAGNOSIS — N18.31 TYPE 2 DIABETES MELLITUS WITH STAGE 3A CHRONIC KIDNEY DISEASE, WITHOUT LONG-TERM CURRENT USE OF INSULIN (H): ICD-10-CM

## 2024-06-19 DIAGNOSIS — N18.31 STAGE 3A CHRONIC KIDNEY DISEASE (H): Primary | ICD-10-CM

## 2024-06-19 NOTE — PROGRESS NOTES
Merline Max has an upcoming lab appointment requesting fasting labs and A1C    Future Appointments   Date Time Provider Department Center   7/1/2024 10:15 AM AN [facility]   7/8/2024  9:00 AM [facility]       There is no order available. Please review and place either future orders or HMPO (Review of Health Maintenance Protocol Orders), as appropriate.    Health Maintenance Due   Topic    ANNUAL REVIEW OF HM ORDERS      Shahida Ford

## 2024-06-28 DIAGNOSIS — E11.22 TYPE 2 DIABETES MELLITUS WITH STAGE 3A CHRONIC KIDNEY DISEASE, WITHOUT LONG-TERM CURRENT USE OF INSULIN (H): ICD-10-CM

## 2024-06-28 DIAGNOSIS — N18.31 TYPE 2 DIABETES MELLITUS WITH STAGE 3A CHRONIC KIDNEY DISEASE, WITHOUT LONG-TERM CURRENT USE OF INSULIN (H): ICD-10-CM

## 2024-06-29 ENCOUNTER — HEALTH MAINTENANCE LETTER (OUTPATIENT)
Age: 69
End: 2024-06-29

## 2024-07-01 ENCOUNTER — LAB (OUTPATIENT)
Dept: LAB | Facility: CLINIC | Age: 69
End: 2024-07-01
Payer: COMMERCIAL

## 2024-07-01 DIAGNOSIS — E11.22 TYPE 2 DIABETES MELLITUS WITH STAGE 3A CHRONIC KIDNEY DISEASE, WITHOUT LONG-TERM CURRENT USE OF INSULIN (H): ICD-10-CM

## 2024-07-01 DIAGNOSIS — N18.31 TYPE 2 DIABETES MELLITUS WITH STAGE 3A CHRONIC KIDNEY DISEASE, WITHOUT LONG-TERM CURRENT USE OF INSULIN (H): ICD-10-CM

## 2024-07-01 DIAGNOSIS — M10.9 GOUTY ARTHRITIS: ICD-10-CM

## 2024-07-01 DIAGNOSIS — N18.31 STAGE 3A CHRONIC KIDNEY DISEASE (H): ICD-10-CM

## 2024-07-01 LAB
ANION GAP SERPL CALCULATED.3IONS-SCNC: 11 MMOL/L (ref 7–15)
BUN SERPL-MCNC: 39.6 MG/DL (ref 8–23)
CALCIUM SERPL-MCNC: 9.6 MG/DL (ref 8.8–10.2)
CHLORIDE SERPL-SCNC: 109 MMOL/L (ref 98–107)
CREAT SERPL-MCNC: 1.52 MG/DL (ref 0.51–0.95)
DEPRECATED HCO3 PLAS-SCNC: 22 MMOL/L (ref 22–29)
EGFRCR SERPLBLD CKD-EPI 2021: 37 ML/MIN/1.73M2
ERYTHROCYTE [DISTWIDTH] IN BLOOD BY AUTOMATED COUNT: 13.3 % (ref 10–15)
GLUCOSE SERPL-MCNC: 120 MG/DL (ref 70–99)
HCT VFR BLD AUTO: 31.8 % (ref 35–47)
HGB BLD-MCNC: 10.4 G/DL (ref 11.7–15.7)
IRON BINDING CAPACITY (ROCHE): 276 UG/DL (ref 240–430)
IRON SATN MFR SERPL: 22 % (ref 15–46)
IRON SERPL-MCNC: 62 UG/DL (ref 37–145)
MCH RBC QN AUTO: 30.8 PG (ref 26.5–33)
MCHC RBC AUTO-ENTMCNC: 32.7 G/DL (ref 31.5–36.5)
MCV RBC AUTO: 94 FL (ref 78–100)
PLATELET # BLD AUTO: 149 10E3/UL (ref 150–450)
POTASSIUM SERPL-SCNC: 4.6 MMOL/L (ref 3.4–5.3)
RBC # BLD AUTO: 3.38 10E6/UL (ref 3.8–5.2)
SODIUM SERPL-SCNC: 142 MMOL/L (ref 135–145)
URATE SERPL-MCNC: 5.5 MG/DL (ref 2.4–5.7)
WBC # BLD AUTO: 4.3 10E3/UL (ref 4–11)

## 2024-07-01 PROCEDURE — 84550 ASSAY OF BLOOD/URIC ACID: CPT

## 2024-07-01 PROCEDURE — 80048 BASIC METABOLIC PNL TOTAL CA: CPT

## 2024-07-01 PROCEDURE — 85027 COMPLETE CBC AUTOMATED: CPT

## 2024-07-01 PROCEDURE — 83550 IRON BINDING TEST: CPT

## 2024-07-01 PROCEDURE — 83540 ASSAY OF IRON: CPT

## 2024-07-01 PROCEDURE — 36415 COLL VENOUS BLD VENIPUNCTURE: CPT

## 2024-07-08 ENCOUNTER — OFFICE VISIT (OUTPATIENT)
Dept: FAMILY MEDICINE | Facility: CLINIC | Age: 69
End: 2024-07-08
Payer: COMMERCIAL

## 2024-07-08 ENCOUNTER — TELEPHONE (OUTPATIENT)
Dept: FAMILY MEDICINE | Facility: CLINIC | Age: 69
End: 2024-07-08

## 2024-07-08 VITALS
DIASTOLIC BLOOD PRESSURE: 75 MMHG | RESPIRATION RATE: 17 BRPM | WEIGHT: 199 LBS | SYSTOLIC BLOOD PRESSURE: 128 MMHG | BODY MASS INDEX: 31.98 KG/M2 | TEMPERATURE: 97.6 F | OXYGEN SATURATION: 98 % | HEART RATE: 83 BPM | HEIGHT: 66 IN

## 2024-07-08 DIAGNOSIS — N18.31 TYPE 2 DIABETES MELLITUS WITH STAGE 3A CHRONIC KIDNEY DISEASE, WITHOUT LONG-TERM CURRENT USE OF INSULIN (H): ICD-10-CM

## 2024-07-08 DIAGNOSIS — Z00.00 ENCOUNTER FOR MEDICARE ANNUAL WELLNESS EXAM: Primary | ICD-10-CM

## 2024-07-08 DIAGNOSIS — E78.5 HYPERLIPIDEMIA LDL GOAL <100: ICD-10-CM

## 2024-07-08 DIAGNOSIS — G47.33 OSA (OBSTRUCTIVE SLEEP APNEA): ICD-10-CM

## 2024-07-08 DIAGNOSIS — E11.22 TYPE 2 DIABETES MELLITUS WITH STAGE 3A CHRONIC KIDNEY DISEASE, WITHOUT LONG-TERM CURRENT USE OF INSULIN (H): ICD-10-CM

## 2024-07-08 DIAGNOSIS — L98.9 SKIN LESION: ICD-10-CM

## 2024-07-08 DIAGNOSIS — I10 ESSENTIAL HYPERTENSION, BENIGN: ICD-10-CM

## 2024-07-08 DIAGNOSIS — D64.9 ANEMIA, UNSPECIFIED TYPE: ICD-10-CM

## 2024-07-08 DIAGNOSIS — N18.32 STAGE 3B CHRONIC KIDNEY DISEASE (H): ICD-10-CM

## 2024-07-08 DIAGNOSIS — E28.39 ESTROGEN DEFICIENCY: ICD-10-CM

## 2024-07-08 DIAGNOSIS — N02.B9 IGA NEPHROPATHY: ICD-10-CM

## 2024-07-08 LAB
HBA1C MFR BLD: 6.2 % (ref 0–5.6)
VIT B12 SERPL-MCNC: 420 PG/ML (ref 232–1245)

## 2024-07-08 PROCEDURE — G0439 PPPS, SUBSEQ VISIT: HCPCS | Performed by: FAMILY MEDICINE

## 2024-07-08 PROCEDURE — 82607 VITAMIN B-12: CPT | Performed by: FAMILY MEDICINE

## 2024-07-08 PROCEDURE — 36415 COLL VENOUS BLD VENIPUNCTURE: CPT | Performed by: FAMILY MEDICINE

## 2024-07-08 PROCEDURE — 83036 HEMOGLOBIN GLYCOSYLATED A1C: CPT | Performed by: FAMILY MEDICINE

## 2024-07-08 PROCEDURE — 99214 OFFICE O/P EST MOD 30 MIN: CPT | Mod: 25 | Performed by: FAMILY MEDICINE

## 2024-07-08 RX ORDER — RESPIRATORY SYNCYTIAL VIRUS VACCINE 120MCG/0.5
0.5 KIT INTRAMUSCULAR ONCE
Qty: 1 EACH | Refills: 0 | Status: CANCELLED | OUTPATIENT
Start: 2024-07-08 | End: 2024-07-08

## 2024-07-08 ASSESSMENT — PAIN SCALES - GENERAL: PAINLEVEL: NO PAIN (0)

## 2024-07-08 NOTE — PROGRESS NOTES
"  Assessment & Plan     Encounter for Medicare annual wellness exam      Type 2 diabetes mellitus with stage 3a chronic kidney disease, without long-term current use of insulin (H)  Well controlled on meds  Will decrease metformin since her gfr not < 45 x2. Will change to 500 bid  Add jardiance or equivalent medication  She has follow-up with nephrology in September  - **Hemoglobin A1c FUTURE 3mo; Future  - PRIMARY CARE FOLLOW-UP SCHEDULING; Future  - empagliflozin (JARDIANCE) 10 MG TABS tablet; Take 1 tablet (10 mg) by mouth daily  - **Hemoglobin A1c FUTURE 3mo    Anemia, unspecified type  Not due to iron deficiency  Most like ACD  - **Vitamin B12 FUTURE 2mo; Future  - **Vitamin B12 FUTURE 2mo    Stage 3b chronic kidney disease (H)  Sees nephrology    IgA nephropathy  Sees nephrology    YANCI (obstructive sleep apnea)  With cpap    Essential hypertension, benign  At goal on meds    Hyperlipidemia LDL goal <100  At goal on meds    Skin lesion  Wants total skin check  - Adult Dermatology  Referral; Future    Estrogen deficiency  Due for dexa  - DX Bone Density; Future          BMI  Estimated body mass index is 32.61 kg/m  as calculated from the following:    Height as of this encounter: 1.664 m (5' 5.5\").    Weight as of this encounter: 90.3 kg (199 lb).   Weight management plan: Discussed healthy diet and exercise guidelines          Haily Rick is a 68 year old, presenting for the following health issues:  Physical and Diabetes        7/8/2024     8:49 AM   Additional Questions   Roomed by marko     History of Present Illness       Diabetes:   She presents for follow up of diabetes.    She is not checking blood glucose.        She is concerned about other.   She is having numbness in feet.  The patient has had a diabetic eye exam in the last 12 months. Eye exam performed on august 2023[facility]        She eats 2-3 servings of fruits and vegetables daily.She consumes 0 " sweetened beverage(s) daily.She exercises with enough effort to increase her heart rate 20 to 29 minutes per day.  She exercises with enough effort to increase her heart rate 3 or less days per week.   She is taking medications regularly.         Annual Wellness Visit     Patient has been advised of split billing requirements and indicates understanding: Yes          Health Care Directive  Patient does not have a Health Care Directive or Living Will: Discussed advance care planning with patient; however, patient declined at this time.  In general, how would you rate your overall physical health? good  Do you have a special diet?  Regular (no restrictions)         No data to display              Do you see a dentist two times every year?  Yes  Have you been more tired than usual lately?  No  If you drink alcohol do you typically have >3 drinks per day or >7 drinks per week? No  Do you have a current opioid prescription? No  Do you use any other controlled substances or medications that are not prescribed by a provider? None  Social History     Tobacco Use    Smoking status: Never    Smokeless tobacco: Never    Tobacco comments:     Lives in smoke free household   Vaping Use    Vaping status: Never Used   Substance Use Topics    Alcohol use: Yes     Comment: two to three drinks a week    Drug use: No       Needs assistance for the following daily activities: no assistance needed  Which of the following safety concerns are present in your home?  none identified   Do you (or your family members) have any concerns about your safety while driving?  No  Do you have any of the following hearing concerns?: No hearing concerns  In the past 6 months, have you been bothered by leaking of urine? No        1/10/2024   Social Factors   Worry food won't last until get money to buy more No   Food not last or not have enough money for food? No   Do you have housing? (Housing is defined as stable permanent housing and does not include  staying ouside in a car, in a tent, in an abandoned building, in an overnight shelter, or couch-surfing.) Yes   Are you worried about losing your housing? No   Lack of transportation? No   Unable to get utilities (heat,electricity)? No             7/8/2024   Fall Risk   Fallen 2 or more times in the past year? No   Trouble with walking or balance? No           Today's PHQ-2 Score:       1/10/2024     7:41 AM   PHQ-2 ( 1999 [employer])   Q1: Little interest or pleasure in doing things 0   Q2: Feeling down, depressed or hopeless 0   PHQ-2 Score 0   Q1: Little interest or pleasure in doing things Not at all   Q2: Feeling down, depressed or hopeless Not at all   PHQ-2 Score 0         12/4/2023   LAST FHS-7 RESULTS   1st degree relative breast or ovarian cancer No   Any relative bilateral breast cancer No   Any male have breast cancer No   Any ONE woman have BOTH breast AND ovarian cancer No   Any woman with breast cancer before 50yrs No   2 or more relatives with breast AND/OR ovarian cancer No   2 or more relatives with breast AND/OR bowel cancer No        Mammogram Screening - Mammogram every 1-2 years updated in Health Maintenance based on mutual decision making      History of abnormal Pap smear: No - age 65 or older with adequate negative prior screening test results (3 consecutive negative cytology results, 2 consecutive negative cotesting results, or 2 consecutive negative HrHPV test results within 10 years, with the most recent test occurring within the recommended screening interval for the test used)       ASCVD Risk   The 10-year ASCVD risk score (Bernadine ROCK, et al., 2019) is: 21.3%    Values used to calculate the score:      Age: 68 years      Sex: Female      Is Non- : No      Diabetic: Yes      Tobacco smoker: No      Systolic Blood Pressure: 128 mmHg      Is BP treated: Yes      HDL Cholesterol: 39 mg/dL      Total Cholesterol: 170 mg/dL    Due for dexa    Reviewed and updated  as needed this visit by Provider                        Current providers sharing in care for this patient include:  Patient Care Team:  Tamika Brown MD as PCP - General (Family Medicine)  Tamika Brown MD as Assigned PCP  Inés Araiza OD as Assigned Surgical Provider  Tang Salter MD as Assigned Sleep Provider    The following health maintenance items are reviewed in Epic and correct as of today:  Health Maintenance   Topic Date Due    ANNUAL REVIEW OF HM ORDERS  Never done    RSV VACCINE (Pregnancy & 60+) (1 - 1-dose 60+ series) Never done    ZOSTER IMMUNIZATION (2 of 2) 12/11/2020    COVID-19 Vaccine (7 - 2023-24 season) 02/13/2024    DIABETIC FOOT EXAM  04/19/2024    EYE EXAM  08/01/2024    INFLUENZA VACCINE (1) 09/01/2024    A1C  01/08/2025    LIPID  01/10/2025    MICROALBUMIN  01/10/2025    BMP  07/01/2025    HEMOGLOBIN  07/01/2025    MEDICARE ANNUAL WELLNESS VISIT  07/08/2025    FALL RISK ASSESSMENT  07/08/2025    MAMMO SCREENING  12/04/2025    COLORECTAL CANCER SCREENING  06/05/2026    DEXA  07/19/2027    ADVANCE CARE PLANNING  07/08/2029    DTAP/TDAP/TD IMMUNIZATION (4 - Td or Tdap) 06/29/2032    HEPATITIS C SCREENING  Completed    PHQ-2 (once per calendar year)  Completed    Pneumococcal Vaccine: 65+ Years  Completed    URINALYSIS  Completed    IPV IMMUNIZATION  Aged Out    HPV IMMUNIZATION  Aged Out    MENINGITIS IMMUNIZATION  Aged Out    RSV MONOCLONAL ANTIBODY  Aged Out       Appropriate preventive services were discussed with this patient, including applicable screening as appropriate for fall prevention, nutrition, physical activity, Tobacco-use cessation, weight loss and cognition.  Checklist reviewing preventive services available has been given to the patient.          7/8/2024   Mini Cog   Mini-Cog Not Completed (choose reason) Patient declines    Patient declines       Multiple values from one day are sorted in reverse-chronological order            No concerns based on  "direct observation        Review of Systems  Constitutional, HEENT, cardiovascular, pulmonary, gi and gu systems are negative, except as otherwise noted.      Objective    /75   Pulse 83   Temp 97.6  F (36.4  C) (Oral)   Resp 17   Ht 1.664 m (5' 5.5\")   Wt 90.3 kg (199 lb)   SpO2 98%   BMI 32.61 kg/m    Body mass index is 32.61 kg/m .  Physical Exam   GENERAL: alert and no distress  EYES: Eyes grossly normal to inspection, PERRL and conjunctivae and sclerae normal  HENT: ear canals and TM's normal, nose and mouth without ulcers or lesions  NECK: no adenopathy, no asymmetry, masses, or scars  RESP: lungs clear to auscultation - no rales, rhonchi or wheezes  CV: regular rate and rhythm, normal S1 S2, no S3 or S4, no murmur, click or rub, no peripheral edema  ABDOMEN: soft, nontender, no hepatosplenomegaly, no masses and bowel sounds normal  MS: no gross musculoskeletal defects noted, no edema  SKIN: no suspicious lesions or rashes  NEURO: Normal strength and tone, mentation intact and speech normal  PSYCH: mentation appears normal, affect normal/bright  Diabetic foot exam: normal DP and PT pulses, no trophic changes or ulcerative lesions, and reduced sensation at toes    Results for orders placed or performed in visit on 07/08/24 (from the past 24 hour(s))   **Hemoglobin A1c FUTURE 3mo   Result Value Ref Range    Hemoglobin A1C 6.2 (H) 0.0 - 5.6 %           Signed Electronically by: Tamika Brown MD    "

## 2024-07-08 NOTE — TELEPHONE ENCOUNTER
Script clarification from [facility]    Medication: metFORMIN (GLUCOPHAGE) 500 MG tablet     Please clarify directions: 1 tab BID or 2 tabs BID?    Thank you,  Stephanie VALVERDE    [phone]

## 2024-07-08 NOTE — PATIENT INSTRUCTIONS
Patient Education   Preventive Care Advice   This is general advice given by our system to help you stay healthy. However, your care team may have specific advice just for you. Please talk to your care team about your preventive care needs.  Nutrition  Eat 5 or more servings of fruits and vegetables each day.  Try wheat bread, brown rice and whole grain pasta (instead of white bread, rice, and pasta).  Get enough calcium and vitamin D. Check the label on foods and aim for 100% of the RDA (recommended daily allowance).  Lifestyle  Exercise at least 150 minutes each week  (30 minutes a day, 5 days a week).  Do muscle strengthening activities 2 days a week. These help control your weight and prevent disease.  No smoking.  Wear sunscreen to prevent skin cancer.  Have a dental exam and cleaning every 6 months.  Yearly exams  See your health care team every year to talk about:  Any changes in your health.  Any medicines your care team has prescribed.  Preventive care, family planning, and ways to prevent chronic diseases.  Shots (vaccines)   HPV shots (up to age 26), if you've never had them before.  Hepatitis B shots (up to age 59), if you've never had them before.  COVID-19 shot: Get this shot when it's due.  Flu shot: Get a flu shot every year.  Tetanus shot: Get a tetanus shot every 10 years.  Pneumococcal, hepatitis A, and RSV shots: Ask your care team if you need these based on your risk.  Shingles shot (for age 50 and up)  General health tests  Diabetes screening:  Starting at age 35, Get screened for diabetes at least every 3 years.  If you are younger than age 35, ask your care team if you should be screened for diabetes.  Cholesterol test: At age 39, start having a cholesterol test every 5 years, or more often if advised.  Bone density scan (DEXA): At age 50, ask your care team if you should have this scan for osteoporosis (brittle bones).  Hepatitis C: Get tested at least once in your life.  STIs (sexually  transmitted infections)  Before age 24: Ask your care team if you should be screened for STIs.  After age 24: Get screened for STIs if you're at risk. You are at risk for STIs (including HIV) if:  You are sexually active with more than one person.  You don't use condoms every time.  You or a partner was diagnosed with a sexually transmitted infection.  If you are at risk for HIV, ask about PrEP medicine to prevent HIV.  Get tested for HIV at least once in your life, whether you are at risk for HIV or not.  Cancer screening tests  Cervical cancer screening: If you have a cervix, begin getting regular cervical cancer screening tests starting at age 21.  Breast cancer scan (mammogram): If you've ever had breasts, begin having regular mammograms starting at age 40. This is a scan to check for breast cancer.  Colon cancer screening: It is important to start screening for colon cancer at age 45.  Have a colonoscopy test every 10 years (or more often if you're at risk) Or, ask your provider about stool tests like a FIT test every year or Cologuard test every 3 years.  To learn more about your testing options, visit:   .  For help making a decision, visit:   https://bit.ly/sq73212.  Prostate cancer screening test: If you have a prostate, ask your care team if a prostate cancer screening test (PSA) at age 55 is right for you.  Lung cancer screening: If you are a current or former smoker ages 50 to 80, ask your care team if ongoing lung cancer screenings are right for you.  For informational purposes only. Not to replace the advice of your health care provider. Copyright   2023 [employer]. All rights reserved. Clinically reviewed by the [facility] Transitions Program. Tessella [id number] - REV 01/24.

## 2024-07-08 NOTE — LETTER
July 8, 2024      Merline Max  [id number]  [facility] [address]        To Whom It May Concern:    Merline Max was seen in our clinic. She is okay to get a tattoo placed on her body.      Sincerely,      Tamika Brown

## 2024-08-12 ENCOUNTER — ANCILLARY PROCEDURE (OUTPATIENT)
Dept: BONE DENSITY | Facility: CLINIC | Age: 69
End: 2024-08-12
Attending: FAMILY MEDICINE
Payer: COMMERCIAL

## 2024-08-12 DIAGNOSIS — E28.39 ESTROGEN DEFICIENCY: ICD-10-CM

## 2024-08-12 PROCEDURE — 77080 DXA BONE DENSITY AXIAL: CPT | Performed by: RADIOLOGY

## 2024-08-12 PROCEDURE — 77081 DXA BONE DENSITY APPENDICULR: CPT | Mod: XU | Performed by: RADIOLOGY

## 2024-08-13 DIAGNOSIS — N18.31 CHRONIC KIDNEY DISEASE (CKD) STAGE G3A/A1, MODERATELY DECREASED GLOMERULAR FILTRATION RATE (GFR) BETWEEN 45-59 ML/MIN/1.73 SQUARE METER AND ALBUMINURIA CREATININE RATIO LESS THAN 30 MG/G (H): Primary | ICD-10-CM

## 2024-08-27 ENCOUNTER — LAB (OUTPATIENT)
Dept: LAB | Facility: CLINIC | Age: 69
End: 2024-08-27
Payer: COMMERCIAL

## 2024-08-27 DIAGNOSIS — N18.31 CHRONIC KIDNEY DISEASE (CKD) STAGE G3A/A1, MODERATELY DECREASED GLOMERULAR FILTRATION RATE (GFR) BETWEEN 45-59 ML/MIN/1.73 SQUARE METER AND ALBUMINURIA CREATININE RATIO LESS THAN 30 MG/G (H): ICD-10-CM

## 2024-08-27 LAB
ALBUMIN MFR UR ELPH: 30.1 MG/DL
ALBUMIN UR-MCNC: 30 MG/DL
ANION GAP SERPL CALCULATED.3IONS-SCNC: 13 MMOL/L (ref 7–15)
APPEARANCE UR: CLEAR
BILIRUB UR QL STRIP: NEGATIVE
BUN SERPL-MCNC: 50.4 MG/DL (ref 8–23)
CALCIUM SERPL-MCNC: 8.6 MG/DL (ref 8.8–10.4)
CHLORIDE SERPL-SCNC: 103 MMOL/L (ref 98–107)
COLOR UR AUTO: YELLOW
CREAT SERPL-MCNC: 1.89 MG/DL (ref 0.51–0.95)
CREAT UR-MCNC: 48.4 MG/DL
EGFRCR SERPLBLD CKD-EPI 2021: 28 ML/MIN/1.73M2
GLUCOSE SERPL-MCNC: 123 MG/DL (ref 70–99)
GLUCOSE UR STRIP-MCNC: 100 MG/DL
HCO3 SERPL-SCNC: 22 MMOL/L (ref 22–29)
HGB UR QL STRIP: ABNORMAL
KETONES UR STRIP-MCNC: NEGATIVE MG/DL
LEUKOCYTE ESTERASE UR QL STRIP: NEGATIVE
NITRATE UR QL: NEGATIVE
PH UR STRIP: 5 [PH] (ref 5–7)
POTASSIUM SERPL-SCNC: 4 MMOL/L (ref 3.4–5.3)
PROT/CREAT 24H UR: 0.62 MG/MG CR (ref 0–0.2)
RBC #/AREA URNS AUTO: ABNORMAL /HPF
SODIUM SERPL-SCNC: 138 MMOL/L (ref 135–145)
SP GR UR STRIP: <=1.005 (ref 1–1.03)
SQUAMOUS #/AREA URNS AUTO: ABNORMAL /LPF
UROBILINOGEN UR STRIP-ACNC: 0.2 E.U./DL
WBC #/AREA URNS AUTO: ABNORMAL /HPF

## 2024-08-27 PROCEDURE — 80048 BASIC METABOLIC PNL TOTAL CA: CPT

## 2024-08-27 PROCEDURE — 36415 COLL VENOUS BLD VENIPUNCTURE: CPT

## 2024-08-27 PROCEDURE — 81001 URINALYSIS AUTO W/SCOPE: CPT

## 2024-08-27 PROCEDURE — 84156 ASSAY OF PROTEIN URINE: CPT

## 2024-09-03 ENCOUNTER — MYC MEDICAL ADVICE (OUTPATIENT)
Dept: FAMILY MEDICINE | Facility: CLINIC | Age: 69
End: 2024-09-03
Payer: COMMERCIAL

## 2024-09-03 NOTE — TELEPHONE ENCOUNTER
Routing to provider - Pt was seen in office 7/8/24. Pt is stating she was having side effects on Jardiance but they have resolved since stopping. Pt asking if you would like to see her prior to 10/10/24. Please see mychart message from pt and advise.     Thank you - Mari Figueredo, MILDREDN, RN

## 2024-09-04 NOTE — TELEPHONE ENCOUNTER
Notified patient of provider's instructions.  Read message to Merline verbatim.  Francheska SANDOVAL    [facility]

## 2024-09-04 NOTE — TELEPHONE ENCOUNTER
Have her see me sooner as with her current gfr, metformin would be contraindicated.   Okay to put in HERRERA slot.     Tamika Brown MD

## 2024-09-09 ENCOUNTER — OFFICE VISIT (OUTPATIENT)
Dept: FAMILY MEDICINE | Facility: CLINIC | Age: 69
End: 2024-09-09
Payer: COMMERCIAL

## 2024-09-09 VITALS
RESPIRATION RATE: 16 BRPM | BODY MASS INDEX: 32.65 KG/M2 | HEART RATE: 96 BPM | DIASTOLIC BLOOD PRESSURE: 72 MMHG | TEMPERATURE: 97.5 F | WEIGHT: 196 LBS | HEIGHT: 65 IN | SYSTOLIC BLOOD PRESSURE: 122 MMHG | OXYGEN SATURATION: 96 %

## 2024-09-09 DIAGNOSIS — N18.32 STAGE 3B CHRONIC KIDNEY DISEASE (H): Primary | ICD-10-CM

## 2024-09-09 DIAGNOSIS — E11.22 TYPE 2 DIABETES MELLITUS WITH STAGE 3A CHRONIC KIDNEY DISEASE, WITHOUT LONG-TERM CURRENT USE OF INSULIN (H): ICD-10-CM

## 2024-09-09 DIAGNOSIS — N18.31 TYPE 2 DIABETES MELLITUS WITH STAGE 3A CHRONIC KIDNEY DISEASE, WITHOUT LONG-TERM CURRENT USE OF INSULIN (H): ICD-10-CM

## 2024-09-09 PROCEDURE — 99214 OFFICE O/P EST MOD 30 MIN: CPT | Performed by: FAMILY MEDICINE

## 2024-09-09 PROCEDURE — 36415 COLL VENOUS BLD VENIPUNCTURE: CPT | Performed by: FAMILY MEDICINE

## 2024-09-09 PROCEDURE — G2211 COMPLEX E/M VISIT ADD ON: HCPCS | Performed by: FAMILY MEDICINE

## 2024-09-09 PROCEDURE — 80048 BASIC METABOLIC PNL TOTAL CA: CPT | Performed by: FAMILY MEDICINE

## 2024-09-09 ASSESSMENT — PAIN SCALES - GENERAL: PAINLEVEL: NO PAIN (0)

## 2024-09-09 NOTE — PROGRESS NOTES
Assessment & Plan     Stage 3b chronic kidney disease (H)  Gfr down on jardiance. Pt stopped taking about 10-14 days ago  Will recheck today  Has follow-up visit with nephrology in 2 days    Type 2 diabetes mellitus with stage 3a chronic kidney disease, without long-term current use of insulin (H)  Well controlled.   Will see if gfr improves off of jardiance and whether we can continue the metformin  She is only on 500 mg once per day  - **Basic metabolic panel FUTURE 14d; Future  - **Basic metabolic panel FUTURE 14d      The longitudinal plan of care for the diagnosis(es)/condition(s) as documented were addressed during this visit. Due to the added complexity in care, I will continue to support Merline in the subsequent management and with ongoing continuity of care.            Haily Rick is a 68 year old, presenting for the following health issues:  Consult (Med not working)      9/9/2024     1:37 PM   Additional Questions   Roomed by Asuncion   Accompanied by self     History of Present Illness       CKD: She uses over the counter pain medication, including I was taking Ibuprofen for severe back paid - oops - but only once per day for about a week, one time daily.    Diabetes:   She presents for follow up of diabetes.    She is not checking blood glucose.         She has no concerns regarding her diabetes at this time.  She is having numbness in feet.  The patient has not had a diabetic eye exam in the last 12 months.          She eats 2-3 servings of fruits and vegetables daily.She consumes 0 sweetened beverage(s) daily.She exercises with enough effort to increase her heart rate 20 to 29 minutes per day.  She exercises with enough effort to increase her heart rate 4 days per week.   She is taking medications regularly.       Pt started on jardiance  Upon lab check per nephrology pt noted gfr now down  She stopped the jardiance almost 2 weeks ago  Will recheck gfr today to see if improved  Will await  "nephrologys further recommendations          Review of Systems  Constitutional, HEENT, cardiovascular, pulmonary, gi and gu systems are negative, except as otherwise noted.      Objective    /72   Pulse 96   Temp 97.5  F (36.4  C) (Oral)   Resp 16   Ht 1.651 m (5' 5\")   Wt 88.9 kg (196 lb)   SpO2 96%   BMI 32.62 kg/m    Body mass index is 32.62 kg/m .  Physical Exam   GENERAL: alert and no distress    No results found for this or any previous visit (from the past 24 hour(s)).        Signed Electronically by: Tamika Brown MD    "

## 2024-09-10 LAB
ANION GAP SERPL CALCULATED.3IONS-SCNC: 13 MMOL/L (ref 7–15)
BUN SERPL-MCNC: 50.3 MG/DL (ref 8–23)
CALCIUM SERPL-MCNC: 9.3 MG/DL (ref 8.8–10.4)
CHLORIDE SERPL-SCNC: 106 MMOL/L (ref 98–107)
CREAT SERPL-MCNC: 1.72 MG/DL (ref 0.51–0.95)
EGFRCR SERPLBLD CKD-EPI 2021: 32 ML/MIN/1.73M2
GLUCOSE SERPL-MCNC: 111 MG/DL (ref 70–99)
HCO3 SERPL-SCNC: 21 MMOL/L (ref 22–29)
POTASSIUM SERPL-SCNC: 4.4 MMOL/L (ref 3.4–5.3)
SODIUM SERPL-SCNC: 140 MMOL/L (ref 135–145)

## 2024-09-11 DIAGNOSIS — N18.31 CHRONIC KIDNEY DISEASE (CKD) STAGE G3A/A1, MODERATELY DECREASED GLOMERULAR FILTRATION RATE (GFR) BETWEEN 45-59 ML/MIN/1.73 SQUARE METER AND ALBUMINURIA CREATININE RATIO LESS THAN 30 MG/G (H): Primary | ICD-10-CM

## 2024-10-01 NOTE — PROGRESS NOTES
Pt is completing a home sleep test. Pt was instructed on how to put on the Noxturnal T3 device and associated equipment before going to bed and given the opportunity to practice putting it on before leaving the sleep center. Pt was reminded to bring the home sleep test kit back to the center tomorrow, at agreed upon time for download and reporting.   Neck circumference: 45 CM / 17 3/4 inches.  Angeles Guillen CMA on 12/14/2023 at 11:10 AM       Female

## 2024-10-10 ENCOUNTER — LAB (OUTPATIENT)
Dept: LAB | Facility: CLINIC | Age: 69
End: 2024-10-10
Payer: COMMERCIAL

## 2024-10-10 DIAGNOSIS — N18.31 CHRONIC KIDNEY DISEASE (CKD) STAGE G3A/A1, MODERATELY DECREASED GLOMERULAR FILTRATION RATE (GFR) BETWEEN 45-59 ML/MIN/1.73 SQUARE METER AND ALBUMINURIA CREATININE RATIO LESS THAN 30 MG/G (H): ICD-10-CM

## 2024-10-10 PROCEDURE — 80048 BASIC METABOLIC PNL TOTAL CA: CPT

## 2024-10-10 PROCEDURE — 36415 COLL VENOUS BLD VENIPUNCTURE: CPT

## 2024-10-11 LAB
ANION GAP SERPL CALCULATED.3IONS-SCNC: 11 MMOL/L (ref 7–15)
BUN SERPL-MCNC: 32.8 MG/DL (ref 8–23)
CALCIUM SERPL-MCNC: 9.5 MG/DL (ref 8.8–10.4)
CHLORIDE SERPL-SCNC: 106 MMOL/L (ref 98–107)
CREAT SERPL-MCNC: 1.43 MG/DL (ref 0.51–0.95)
EGFRCR SERPLBLD CKD-EPI 2021: 40 ML/MIN/1.73M2
GLUCOSE SERPL-MCNC: 124 MG/DL (ref 70–99)
HCO3 SERPL-SCNC: 23 MMOL/L (ref 22–29)
POTASSIUM SERPL-SCNC: 4.3 MMOL/L (ref 3.4–5.3)
SODIUM SERPL-SCNC: 140 MMOL/L (ref 135–145)

## 2024-10-14 ENCOUNTER — OFFICE VISIT (OUTPATIENT)
Dept: OPTOMETRY | Facility: CLINIC | Age: 69
End: 2024-10-14
Payer: COMMERCIAL

## 2024-10-14 DIAGNOSIS — H52.223 REGULAR ASTIGMATISM OF BOTH EYES: ICD-10-CM

## 2024-10-14 DIAGNOSIS — E11.9 TYPE 2 DIABETES MELLITUS WITHOUT COMPLICATION, WITHOUT LONG-TERM CURRENT USE OF INSULIN (H): ICD-10-CM

## 2024-10-14 DIAGNOSIS — Z01.01 VISION EXAM WITH ABNORMAL FINDINGS: Primary | ICD-10-CM

## 2024-10-14 DIAGNOSIS — H52.03 HYPEROPIA, BILATERAL: ICD-10-CM

## 2024-10-14 DIAGNOSIS — H52.4 PRESBYOPIA: ICD-10-CM

## 2024-10-14 PROCEDURE — 92015 DETERMINE REFRACTIVE STATE: CPT | Performed by: OPTOMETRIST

## 2024-10-14 PROCEDURE — 92014 COMPRE OPH EXAM EST PT 1/>: CPT | Performed by: OPTOMETRIST

## 2024-10-14 ASSESSMENT — REFRACTION_WEARINGRX
OS_CYLINDER: +1.00
OS_AXIS: 150
OD_AXIS: 010
OD_SPHERE: +2.00
OS_SPHERE: +2.50
OS_AXIS: 150
OD_AXIS: 010
OS_CYLINDER: +1.00
OS_SPHERE: +1.00
OD_CYLINDER: +1.00
OD_SPHERE: +0.50
OD_CYLINDER: +1.00

## 2024-10-14 ASSESSMENT — REFRACTION_MANIFEST
OS_SPHERE: +1.00
OD_CYLINDER: +0.75
OD_ADD: +2.00
OD_SPHERE: +0.75
OS_AXIS: 145
OS_CYLINDER: +1.25
OS_ADD: +2.00
OS_SPHERE: +1.25
OD_AXIS: 002
OD_AXIS: 180
METHOD_AUTOREFRACTION: 1
OD_CYLINDER: +0.75
OD_SPHERE: +1.00
OS_CYLINDER: +1.00
OS_AXIS: 137

## 2024-10-14 ASSESSMENT — CONF VISUAL FIELD
OD_SUPERIOR_TEMPORAL_RESTRICTION: 0
OD_INFERIOR_TEMPORAL_RESTRICTION: 0
OS_SUPERIOR_NASAL_RESTRICTION: 0
OS_NORMAL: 1
OS_SUPERIOR_TEMPORAL_RESTRICTION: 0
OD_NORMAL: 1
METHOD: COUNTING FINGERS
OD_SUPERIOR_NASAL_RESTRICTION: 0
OS_INFERIOR_TEMPORAL_RESTRICTION: 0
OD_INFERIOR_NASAL_RESTRICTION: 0
OS_INFERIOR_NASAL_RESTRICTION: 0

## 2024-10-14 ASSESSMENT — VISUAL ACUITY
METHOD: SNELLEN - LINEAR
OD_CC+: -1
OS_CC: 20/25
OD_CC: 20/25
OS_CC: 20/30
CORRECTION_TYPE: GLASSES
OD_CC: 20/30

## 2024-10-14 ASSESSMENT — CUP TO DISC RATIO
OS_RATIO: 0.5
OD_RATIO: 0.5

## 2024-10-14 ASSESSMENT — KERATOMETRY
OD_AXISANGLE2_DEGREES: 150
OS_K1POWER_DIOPTERS: 42.50
OS_AXISANGLE2_DEGREES: 28
OD_K1POWER_DIOPTERS: 43.25
OS_K2POWER_DIOPTERS: 43.75
OD_K2POWER_DIOPTERS: 43.75

## 2024-10-14 ASSESSMENT — PACHYMETRY
OS_CT(UM): 671
OD_CT(UM): 668

## 2024-10-14 ASSESSMENT — EXTERNAL EXAM - LEFT EYE: OS_EXAM: NORMAL

## 2024-10-14 ASSESSMENT — TONOMETRY
OD_IOP_MMHG: 19
IOP_METHOD: APPLANATION
OS_IOP_MMHG: 19

## 2024-10-14 ASSESSMENT — EXTERNAL EXAM - RIGHT EYE: OD_EXAM: NORMAL

## 2024-10-14 ASSESSMENT — SLIT LAMP EXAM - LIDS
COMMENTS: 1+ DERMATOCHALASIS
COMMENTS: 1+ DERMATOCHALASIS

## 2024-10-14 NOTE — PROGRESS NOTES
Chief Complaint   Patient presents with    Diabetic Eye Exam     Accompanied by , also having an eye exam   Chief Complaint(s) and History of Present Illness(es)       Diabetic Eye Exam              Vision: is stable    Diabetes Type: Type 2 and taking oral medications    Blood Sugars: is controlled                   Lab Results   Component Value Date    A1C 6.2 07/08/2024    A1C 6.3 01/10/2024    A1C 5.7 04/11/2023    A1C 5.8 02/09/2022    A1C 5.6 05/24/2021    A1C 6.0 07/15/2020    A1C 6.3 01/09/2020    A1C 6.1 12/29/2016    A1C 6.0 06/15/2016            Last Eye Exam: 2/2023  Dilated Previously: Yes    What are you currently using to see?  Glasses, wears on occasion     Distance Vision Acuity: Satisfied with vision with the glasses     Near Vision Acuity: Satisfied with vision while reading and using computer with glasses or OTC readers     Eye Comfort: itchy and watery sometimes   Do you use eye drops? : Yes: Uses Systane and Zaditor as needed.   Occupation or Hobbies: Works in office     [employer]      Medical, surgical and family histories reviewed and updated 10/14/2024.       OBJECTIVE: See Ophthalmology exam    ASSESSMENT:    ICD-10-CM    1. Vision exam with abnormal findings  Z01.01       2. Type 2 diabetes mellitus without complication, without long-term current use of insulin (H)  E11.9       3. Hyperopia, bilateral  H52.03       4. Regular astigmatism of both eyes  H52.223       5. Presbyopia  H52.4           PLAN:    Merline Max aware  eye exam results will be sent to Tamika Brown.  Patient Instructions   Optional to fill new glasses prescription, minimal change  Keep blood sugar under good control  Use allergy drops Zaditor  and artificial tears like Systane Ultra   Return in 1 year for diabetic eye exam      Blood sugar and blood pressure control are very important in the prevention of ocular complications from diabetes.       Inés Araiza,  OD  [phone]

## 2024-10-14 NOTE — PATIENT INSTRUCTIONS
Optional to fill new glasses prescription, minimal change  Keep blood sugar under good control  Use allergy drops Zaditor  and artificial tears like Systane Ultra   Return in 1 year for diabetic eye exam      Blood sugar and blood pressure control are very important in the prevention of ocular complications from diabetes.       Inés Araiza, OD  [phone]

## 2024-10-14 NOTE — LETTER
10/14/2024      Merline Max  [address]  [facility] [address]      Dear Colleague,    Thank you for referring your patient, Merline Max, to the [facility]. Please see a copy of my visit note below.    Chief Complaint   Patient presents with     Diabetic Eye Exam     Accompanied by , also having an eye exam   Chief Complaint(s) and History of Present Illness(es)       Diabetic Eye Exam              Vision: is stable    Diabetes Type: Type 2 and taking oral medications    Blood Sugars: is controlled                   Lab Results   Component Value Date    A1C 6.2 07/08/2024    A1C 6.3 01/10/2024    A1C 5.7 04/11/2023    A1C 5.8 02/09/2022    A1C 5.6 05/24/2021    A1C 6.0 07/15/2020    A1C 6.3 01/09/2020    A1C 6.1 12/29/2016    A1C 6.0 06/15/2016            Last Eye Exam: 2/2023  Dilated Previously: Yes    What are you currently using to see?  Glasses, wears on occasion     Distance Vision Acuity: Satisfied with vision with the glasses     Near Vision Acuity: Satisfied with vision while reading and using computer with glasses or OTC readers     Eye Comfort: itchy and watery sometimes   Do you use eye drops? : Yes: Uses Systane and Zaditor as needed.   Occupation or Hobbies: Works in office     [employer]      Medical, surgical and family histories reviewed and updated 10/14/2024.       OBJECTIVE: See Ophthalmology exam    ASSESSMENT:    ICD-10-CM    1. Vision exam with abnormal findings  Z01.01       2. Type 2 diabetes mellitus without complication, without long-term current use of insulin (H)  E11.9       3. Hyperopia, bilateral  H52.03       4. Regular astigmatism of both eyes  H52.223       5. Presbyopia  H52.4           PLAN:    Merline Max aware  eye exam results will be sent to Tamika Brown.  Patient Instructions   Optional to fill new glasses prescription, minimal change  Keep blood sugar under good control  Use allergy drops Zaditor  and  artificial tears like Systane Ultra   Return in 1 year for diabetic eye exam      Blood sugar and blood pressure control are very important in the prevention of ocular complications from diabetes.       Inés Araiza, OD  [phone]                      Again, thank you for allowing me to participate in the care of your patient.        Sincerely,        Inés Araiza, OD

## 2024-12-31 ENCOUNTER — ANCILLARY PROCEDURE (OUTPATIENT)
Dept: MAMMOGRAPHY | Facility: CLINIC | Age: 69
End: 2024-12-31
Attending: FAMILY MEDICINE
Payer: COMMERCIAL

## 2024-12-31 DIAGNOSIS — Z12.31 VISIT FOR SCREENING MAMMOGRAM: ICD-10-CM

## 2024-12-31 PROCEDURE — 77063 BREAST TOMOSYNTHESIS BI: CPT | Mod: TC | Performed by: RADIOLOGY

## 2024-12-31 PROCEDURE — 77067 SCR MAMMO BI INCL CAD: CPT | Mod: TC | Performed by: RADIOLOGY

## 2025-02-08 ENCOUNTER — HEALTH MAINTENANCE LETTER (OUTPATIENT)
Age: 70
End: 2025-02-08

## 2025-02-10 ASSESSMENT — SLEEP AND FATIGUE QUESTIONNAIRES
HOW LIKELY ARE YOU TO NOD OFF OR FALL ASLEEP WHILE SITTING INACTIVE IN A PUBLIC PLACE: WOULD NEVER DOZE
HOW LIKELY ARE YOU TO NOD OFF OR FALL ASLEEP WHILE SITTING AND READING: SLIGHT CHANCE OF DOZING
HOW LIKELY ARE YOU TO NOD OFF OR FALL ASLEEP WHEN YOU ARE A PASSENGER IN A CAR FOR AN HOUR WITHOUT A BREAK: SLIGHT CHANCE OF DOZING
HOW LIKELY ARE YOU TO NOD OFF OR FALL ASLEEP WHILE LYING DOWN TO REST IN THE AFTERNOON WHEN CIRCUMSTANCES PERMIT: SLIGHT CHANCE OF DOZING
HOW LIKELY ARE YOU TO NOD OFF OR FALL ASLEEP IN A CAR, WHILE STOPPED FOR A FEW MINUTES IN TRAFFIC: WOULD NEVER DOZE
HOW LIKELY ARE YOU TO NOD OFF OR FALL ASLEEP WHILE WATCHING TV: SLIGHT CHANCE OF DOZING
HOW LIKELY ARE YOU TO NOD OFF OR FALL ASLEEP WHILE SITTING QUIETLY AFTER LUNCH WITHOUT ALCOHOL: WOULD NEVER DOZE
HOW LIKELY ARE YOU TO NOD OFF OR FALL ASLEEP WHILE SITTING AND TALKING TO SOMEONE: WOULD NEVER DOZE

## 2025-02-11 ENCOUNTER — OFFICE VISIT (OUTPATIENT)
Dept: SLEEP MEDICINE | Facility: CLINIC | Age: 70
End: 2025-02-11
Payer: COMMERCIAL

## 2025-02-11 VITALS
RESPIRATION RATE: 15 BRPM | HEART RATE: 75 BPM | HEIGHT: 66 IN | OXYGEN SATURATION: 97 % | WEIGHT: 207 LBS | BODY MASS INDEX: 33.27 KG/M2 | DIASTOLIC BLOOD PRESSURE: 72 MMHG | SYSTOLIC BLOOD PRESSURE: 131 MMHG

## 2025-02-11 DIAGNOSIS — G47.33 OSA (OBSTRUCTIVE SLEEP APNEA): Primary | ICD-10-CM

## 2025-02-11 DIAGNOSIS — N18.32 STAGE 3B CHRONIC KIDNEY DISEASE (H): ICD-10-CM

## 2025-02-11 DIAGNOSIS — E11.9 TYPE 2 DIABETES MELLITUS WITHOUT COMPLICATION, WITHOUT LONG-TERM CURRENT USE OF INSULIN (H): ICD-10-CM

## 2025-02-11 PROCEDURE — 99213 OFFICE O/P EST LOW 20 MIN: CPT | Performed by: INTERNAL MEDICINE

## 2025-02-11 NOTE — NURSING NOTE
"Chief Complaint   Patient presents with    CPAP Follow Up     CPAP Compliance        Initial /72   Pulse 75   Resp 15   Ht 1.676 m (5' 6\")   Wt 93.9 kg (207 lb)   SpO2 97%   BMI 33.41 kg/m   Estimated body mass index is 33.41 kg/m  as calculated from the following:    Height as of this encounter: 1.676 m (5' 6\").    Weight as of this encounter: 93.9 kg (207 lb).    Medication Reconciliation: complete      DME: [facility]      "

## 2025-02-11 NOTE — PROGRESS NOTES
[facility] [address] [address]  Phone: [phone]    Patient:  Merline Max, Date of birth 1955  Date of Visit:  02/11/2025  Referring Provider Deepika Dyer                  Assessment and Plan:   Mandi, 68 years old female with underlying history of hypertension, type 2 diabetes, chronic kidney disease stage III was diagnosed with severe obstructive sleep apnea with an AHI of 75.9 events per hour.  She is now on auto titrating CPAP 8-18 cm of H2O with overall excellent compliance and effective pressure therapy.    Sleep disordered breathing.  Severe obstructive sleep apnea, currently on auto titrating CPAP 8-18 cm of water with overall excellent compliance (there is recent decline in her recent compliance due to not registering of the therapy data when she was [place]) and effective pressure therapy with her most recent AHI 1.63/h.  She is liking the new pressure range and feels that this helps her sleep better.  PAP interface.  Now using the nasal mask with better fit and less time in large air leaks.    Excessive daytime sleepiness.  The subjective daytime sleepiness has significantly improved with ongoing PAP therapy.        Comorbid Diagnoses:     Class II obesity.  Hypertension.  Chronic kidney disease stage III.     Summary Recommendations:     Merline was reassured regarding the effective CPAP therapy.  She is advised to continue auto titrating CPAP at 8-18 cm of water.    Continue use of nasal mask interface.  Follow-up in 1 year.    Summary Counseling:  Check out [email or web address]           History of Present Illness:     Merline Max is a 69 year old female with above-mentioned medical history is here for a follow-up visit after increasing her auto titrating CPAP to 8-18 cm of water.  She feels that she gets more air and is sleeping better.  She has started using a nasal mask/interface which is more comfortable  and has less episodes of significant air leak.  She is waking up well rested and denies having any subjective daytime sleepiness.  No new complaints were made by her today.      PREVIOUS SLEEP STUDIES: Home sleep study  Date: 12/14/2023  AHI: 75.9 / hour  Intervention: auto-CPAP 5-15 cm H2O    Sleep wake schedule:   Bedtime 12 AM  Awakening 8 AM without using alarm. She wakes 1-2/night to micturate  Awakenings for 2-5 minutes 1-2 per night/week  Naps : 0     Overall, she rates the experience with PAP as 9 (0 poor, 10 great). The mask is comfortable. The mask is leaking.  She is not snoring with the mask on. She is not having gasp arousals.  She is not having significant oral/nasal dryness. The pressure is comfortable.     Her PAP interface is Nasal Mask.    She does feel rested in the morning.    Converse Sleepiness Scale: 4/24    JUSTINA Total Score: (Patient-Rptd) 4      Objective:  CPAP Compliance Targets:   >70% days > 4 hours AHI < 5   30 days ending February 11, 2025   ResMed   Auto-PAP 8.0 - 18.0 cmH2O 30 day usage data:    66% of days with > 4 hours of use. 9/30 days with no use.   Average use 307 minutes per day.   95%ile Leak 31.04 L/min.   CPAP 95% pressure 12.9 cm.   AHI 1.63 events per hour.               Medications:     Current Outpatient Medications   Medication Sig Dispense Refill    allopurinol (ZYLOPRIM) 100 MG tablet Take 200 mg by mouth daily.      Aspirin (ASPIR-81 PO) Take 1 tablet by mouth daily.      blood glucose (BEATRIZ CONTOUR) test strip Three times daily 100 strip prn    cetirizine (ZYRTEC) 10 MG tablet Take 10 mg by mouth daily.      docusate sodium (COLACE) 100 MG capsule Take 1 capsule by mouth as needed.      hydrochlorothiazide (MICROZIDE) 12.5 MG capsule Take 1 capsule by mouth daily. 1 capsule 0    irbesartan (AVAPRO) 300 MG tablet Take 300 mg by mouth At Bedtime.      ketotifen (ZADITOR) 0.025 % SOLN 1 drop as needed.      Lancet Devices (AUTO-LANCET) MISC Three times daily 100 each  prn    lancets thin MISC 1 each 2 times daily. 100 each prn    metFORMIN (GLUCOPHAGE) 500 MG tablet Take 1 tablet (500 mg) by mouth 2 times daily (with meals) 180 tablet 1    methylcellulose, laxative, (CITRUCEL) POWD Take 1 ounce at bedtime PRN.      Multiple Vitamin (MULTIVITAMIN ADULT) TABS       polyethylene glycol - propylene glycol (SYSTANE) 0.4-0.3 % SOLN Place 1 drop into both eyes 2 times daily as needed.      simvastatin (ZOCOR) 20 MG tablet Take 20 mg by mouth At Bedtime.       No current facility-administered medications for this visit.        Allergies   Allergen Reactions    Empagliflozin Other (See Comments)     Renal failure            Past Medical History:     Does not need 02 supplement at night   Past Medical History:   Diagnosis Date    Arthritis     Fatigue 7/16/2012    Fracture     stress foot    Gout     Hyperlipidemia     Hypertension     Nephropathy 1998    IGA    Nonsenile cataract     YANCI (obstructive sleep apnea) 12/19/2023    Type 2 diabetes, HbA1c goal < 7% (H) 7/16/2012    Uveitis     iritis right eye since 2008             Past Surgical History:    No h/o  upper airway surgery  Past Surgical History:   Procedure Laterality Date    ABDOMEN SURGERY  1978, 1984, 1986, 1996, 1997,    3 C-Sections, Total Hysterectomy, Gallbladder Removal,    BIOPSY KIDNEY  01/01/1996    Ig A nephropathy    CHOLECYSTECTOMY  01/01/1996    COLONOSCOPY  06/18/2013    Procedure: COLONOSCOPY;  COLONOSCOPY - POLYP/ SANTILLI;  Surgeon: Ulices Gregory MD;  Location: [facility]    COLONOSCOPY  10/01/2013    Procedure: COMBINED COLONOSCOPY, SINGLE BIOPSY/POLYPECTOMY BY BIOPSY;;  Surgeon: Ulices Gregory MD;  [facility]    COLONOSCOPY N/A 12/04/2014    Procedure: COMBINED COLONOSCOPY, SINGLE OR MULTIPLE BIOPSY/POLYPECTOMY BY BIOPSY;  Surgeon: Ulices Gregory MD;  [facility]    COLONOSCOPY N/A 6/5/2023    Procedure: COLONOSCOPY, FLEXIBLE, WITH LESION REMOVAL USING SNARE;  Surgeon:  "Vicki Butler DO;  [facility]    COLONOSCOPY WITH CO2 INSUFFLATION N/A 2014    Procedure: COLONOSCOPY WITH CO2 INSUFFLATION;  Surgeon: Ulices Gregory MD;  [facility]    COLONOSCOPY WITH CO2 INSUFFLATION N/A 2020    Procedure: COLONOSCOPY, WITH CO2 INSUFFLATION;  Surgeon: Eliud Huang DO;  [facility]    COLONOSCOPY WITH CO2 INSUFFLATION N/A 2023    Procedure: Colonoscopy with CO2 insufflation;  Surgeon: Vicki Butler DO;  Location: [facility]    FOOT SURGERY      Lt foot.     GYN SURGERY       x3    HERNIORRHAPHY UMBILICAL  1996    HYSTERECTOMY TOTAL ABDOMINAL, BILATERAL SALPINGO-OOPHORECTOMY, COMBINED  1996    cyst, endometriosis    HYSTERECTOMY, PAP NO LONGER INDICATED      SIGMOIDOSCOPY FLEXIBLE N/A 2024    Procedure: Sigmoidoscopy flexible;  Surgeon: Vicki Butler DO;  Location: [facility]            Physical Examination:     Objective   Vitals: /72   Pulse 75   Resp 15   Ht 1.676 m (5' 6\")   Wt 93.9 kg (207 lb)   SpO2 97%   BMI 33.41 kg/m      General: healthy, alert, and no distress  Psych: Alert and oriented times 3; coherent speech, normal   rate and volume, able to articulate logical thoughts, able   to abstract reason, no tangential thoughts, no hallucinations   or delusions  His affect is normal.    Copy to: Tamika Brown    Total of 20 minutes of time was spent with patient, this included the interview and exam, and review of the chart/labs/imaging/sleep study/PAP therapy data on 2025 . Greater than 50% of which was spent counseling and coordinating care.   Manisha Poe MD 2025     [facility]   [address], [address]   [address] [place], [place] [address]. [place] [address]   Appointments: [phone]   Manisha Poe MD           "

## 2025-02-18 DIAGNOSIS — E11.22 TYPE 2 DIABETES MELLITUS WITH STAGE 3A CHRONIC KIDNEY DISEASE, WITHOUT LONG-TERM CURRENT USE OF INSULIN (H): ICD-10-CM

## 2025-02-18 DIAGNOSIS — N18.31 TYPE 2 DIABETES MELLITUS WITH STAGE 3A CHRONIC KIDNEY DISEASE, WITHOUT LONG-TERM CURRENT USE OF INSULIN (H): ICD-10-CM

## 2025-03-03 ENCOUNTER — OFFICE VISIT (OUTPATIENT)
Dept: DERMATOLOGY | Facility: CLINIC | Age: 70
End: 2025-03-03
Attending: FAMILY MEDICINE
Payer: COMMERCIAL

## 2025-03-03 DIAGNOSIS — L57.0 ACTINIC KERATOSIS: Primary | ICD-10-CM

## 2025-03-03 DIAGNOSIS — L71.9 ROSACEA: ICD-10-CM

## 2025-03-03 PROCEDURE — 17003 DESTRUCT PREMALG LES 2-14: CPT | Performed by: DERMATOLOGY

## 2025-03-03 PROCEDURE — 99203 OFFICE O/P NEW LOW 30 MIN: CPT | Mod: 25 | Performed by: DERMATOLOGY

## 2025-03-03 PROCEDURE — 17000 DESTRUCT PREMALG LESION: CPT | Performed by: DERMATOLOGY

## 2025-03-03 PROCEDURE — 1126F AMNT PAIN NOTED NONE PRSNT: CPT | Performed by: DERMATOLOGY

## 2025-03-03 ASSESSMENT — PAIN SCALES - GENERAL: PAINLEVEL_OUTOF10: NO PAIN (0)

## 2025-03-03 NOTE — LETTER
3/3/2025      Merline Max  [id number]  [facility] [address]      Dear Colleague,    Thank you for referring your patient, Merline Max, to the [facility]. Please see a copy of my visit note below.    [facility] Dermatology Note    Encounter Date: Mar 3, 2025    Dermatology Problem List:  1. Rosacea  - current tx: metrocream  2. AKs  - s/p cryo    ______________________________________    Impression/Plan:    1. Rosacea, papulopustular . Has used metrocream in the past. Discussed topical treatment options of metronidazole cream or clindamycin lotion. Reviewed oral medications of doxycyline.   - Start metrocream    2. Actinic keratosis, R hand x 2, face x 1  - Cryotherapy performed today. See procedure section.    Procedures:  Cryotherapy procedure note: After verbal consent and discussion of risks and benefits including, but not limited to, dyspigmentation/scar, blister, and pain, 3 AKs was(were) treated with 1-2 mm freeze border for 1-2 cycles with liquid nitrogen. Post cryotherapy instructions were provided.       Follow-up in  4/23/25 for skin check as scheduled, 6 months, Rosacea f/u.       Staff Involved:  Staff and Scribe  Scribe Disclosure:   I, Sola Zuñiga, am serving as a scribe to document services personally performed by Nicolás Kirby MD based on data collection and the provider's statements to me.     Provider Disclosure:   The documentation recorded by the scribe accurately reflects the services I personally performed and the decisions made by me.    Nicolás Kirby MD   of Dermatology  Department of Dermatology  [facility] [employer]        CC:   Chief Complaint   Patient presents with     Rosacea     Pt here for rosacea. Pt was originally diagnosed with rosacea approximately 40 years ago. Patient has previously used metrocream with relief. She still struggles with discoloration and break outs and  seeking treatment again.        History of Present Illness:  Ms. Merline Max is a 69 year old female who presents as a new patient.    Today, patient reports being diagnosed with Rosacea many years ago and used metrocream that helped. In the last few years it has flared up and now develops pustules. Also reports areas of concern on the R hand and face.     Labs:  N/a    Physical exam:  Vitals: There were no vitals taken for this visit.  GEN: This is a well developed, well-nourished female in no acute distress, in a pleasant mood.    SKIN: Chou phototype I  - Focused examination of the face and hands was performed.  - Erythematous scaly macules on the R hand x 2, and face x 1  - Scattered erythematous papules and pustules with background erythema on the face.   - No other lesions of concern on areas examined.     Past Medical History:   Past Medical History:   Diagnosis Date     Arthritis      Fatigue 7/16/2012     Fracture     stress foot     Gout      Hyperlipidemia      Hypertension      Nephropathy 1998    IGA     Nonsenile cataract      YANCI (obstructive sleep apnea) 12/19/2023     Type 2 diabetes, HbA1c goal < 7% (H) 7/16/2012     Uveitis     iritis right eye since 2008     Past Surgical History:   Procedure Laterality Date     ABDOMEN SURGERY  1978, 1984, 1986, 1996, 1997,    3 C-Sections, Total Hysterectomy, Gallbladder Removal,     BIOPSY KIDNEY  01/01/1996    Ig A nephropathy     CHOLECYSTECTOMY  01/01/1996     COLONOSCOPY  06/18/2013    Procedure: COLONOSCOPY;  COLONOSCOPY - POLYP/ SANTILLI;  Surgeon: Ulices Gregory MD;  [facility]: [facility]     COLONOSCOPY  10/01/2013    Procedure: COMBINED COLONOSCOPY, SINGLE BIOPSY/POLYPECTOMY BY BIOPSY;;  Surgeon: Ulices Gregory MD;  [facility]     COLONOSCOPY N/A 12/04/2014    Procedure: COMBINED COLONOSCOPY, SINGLE OR MULTIPLE BIOPSY/POLYPECTOMY BY BIOPSY;  Surgeon: Ulices Gregory MD;  [facility]     COLONOSCOPY N/A 6/5/2023     Procedure: COLONOSCOPY, FLEXIBLE, WITH LESION REMOVAL USING SNARE;  Surgeon: Vicki Butler DO;  [facility]     COLONOSCOPY WITH CO2 INSUFFLATION N/A 2014    Procedure: COLONOSCOPY WITH CO2 INSUFFLATION;  Surgeon: Ulices Gregory MD;  [facility]     COLONOSCOPY WITH CO2 INSUFFLATION N/A 2020    Procedure: COLONOSCOPY, WITH CO2 INSUFFLATION;  Surgeon: Eliud Huang DO;  [facility]     COLONOSCOPY WITH CO2 INSUFFLATION N/A 2023    Procedure: Colonoscopy with CO2 insufflation;  Surgeon: Vicki Butler DO;  [facility]     FOOT SURGERY      Lt foot.      GYN SURGERY       x3     HERNIORRHAPHY UMBILICAL  1996     HYSTERECTOMY TOTAL ABDOMINAL, BILATERAL SALPINGO-OOPHORECTOMY, COMBINED  1996    cyst, endometriosis     HYSTERECTOMY, PAP NO LONGER INDICATED       SIGMOIDOSCOPY FLEXIBLE N/A 2024    Procedure: Sigmoidoscopy flexible;  Surgeon: Vicki Butler DO;  [facility]       Social History:   reports that she has never smoked. She has never used smokeless tobacco. She reports current alcohol use. She reports that she does not use drugs.    Family History:  Family History   Problem Relation Age of Onset     Cancer Mother 64        colon      Colon Cancer Mother      Other Cancer Mother         Colon     Cancer Father         adrenal and lung     Other Cancer Father         Adrenal & Lung     Glaucoma Maternal Grandmother         corneal transplant     EYE* Maternal Grandmother         corneal transplant     Hypertension Maternal Grandmother      Cerebrovascular Disease Maternal Grandmother 85        TIA     Eye Surgery Maternal Grandmother         cataracts     Cancer Maternal Grandfather      Cancer Paternal Grandmother      Eye Surgery Brother         cataracts     C.A.D. Brother 46        quad bypass     Arrhythmia Brother      Glaucoma Brother 73     Asthma Daughter         SInce birth, small lung function     Glaucoma Daughter 40         monitoring pressure     Glaucoma Daughter 38        watching  pressure     Glaucoma Daughter 40        wachting pressure     Glaucoma Maternal Aunt      Thyroid Disease Maternal Aunt      Cancer Maternal Aunt      Breast Cancer Other 80        Maternal great aunt     Breast Cancer Other      Diabetes No family hx of      Macular Degeneration No family hx of        Medications:  Current Outpatient Medications   Medication Sig Dispense Refill     allopurinol (ZYLOPRIM) 100 MG tablet Take 200 mg by mouth daily.       Aspirin (ASPIR-81 PO) Take 1 tablet by mouth daily.       blood glucose (BEATRIZ CONTOUR) test strip Three times daily 100 strip prn     cetirizine (ZYRTEC) 10 MG tablet Take 10 mg by mouth daily.       docusate sodium (COLACE) 100 MG capsule Take 1 capsule by mouth as needed.       hydrochlorothiazide (MICROZIDE) 12.5 MG capsule Take 1 capsule by mouth daily. 1 capsule 0     irbesartan (AVAPRO) 300 MG tablet Take 300 mg by mouth At Bedtime.       ketotifen (ZADITOR) 0.025 % SOLN 1 drop as needed.       Lancet Devices (AUTO-LANCET) MISC Three times daily 100 each prn     lancets thin MISC 1 each 2 times daily. 100 each prn     metFORMIN (GLUCOPHAGE) 500 MG tablet Take 1 tablet (500 mg) by mouth 2 times daily (with meals). Needs to be seen for further refills 60 tablet 0     methylcellulose, laxative, (CITRUCEL) POWD Take 1 ounce at bedtime PRN.       Multiple Vitamin (MULTIVITAMIN ADULT) TABS        polyethylene glycol - propylene glycol (SYSTANE) 0.4-0.3 % SOLN Place 1 drop into both eyes 2 times daily as needed.       simvastatin (ZOCOR) 20 MG tablet Take 20 mg by mouth At Bedtime.       Allergies   Allergen Reactions     Empagliflozin Other (See Comments)     Renal failure     Seasonal Allergies      Watering eyes                 Again, thank you for allowing me to participate in the care of your patient.        Sincerely,        Nicolás Kirby MD    Electronically signed

## 2025-03-03 NOTE — NURSING NOTE
Merline Max's chief complaint for this visit includes:  Chief Complaint   Patient presents with    Rosacea     Pt here for rosacea. Pt was originally diagnosed with rosacea approximately 40 years ago. Patient has previously used metrocream with relief. She still struggles with discoloration and break outs and seeking treatment again.      PCP: Tamika Brown    Referring Provider:  Tamika Brown MD  [address] [place] [address]    There were no vitals taken for this visit.  No Pain (0)        Allergies   Allergen Reactions    Empagliflozin Other (See Comments)     Renal failure    Seasonal Allergies      Watering eyes         Do you need any medication refills at today's visit?    Day Burroughs on 3/3/2025 at 11:27 AM

## 2025-03-03 NOTE — PROGRESS NOTES
[facility] Dermatology Note    Encounter Date: Mar 3, 2025    Dermatology Problem List:  1. Rosacea  - current tx: metrocream  2. AKs  - s/p cryo    ______________________________________    Impression/Plan:    1. Rosacea, papulopustular . Has used metrocream in the past. Discussed topical treatment options of metronidazole cream or clindamycin lotion. Reviewed oral medications of doxycyline.   - Start metrocream    2. Actinic keratosis, R hand x 2, face x 1  - Cryotherapy performed today. See procedure section.    Procedures:  Cryotherapy procedure note: After verbal consent and discussion of risks and benefits including, but not limited to, dyspigmentation/scar, blister, and pain, 3 AKs was(were) treated with 1-2 mm freeze border for 1-2 cycles with liquid nitrogen. Post cryotherapy instructions were provided.       Follow-up in  4/23/25 for skin check as scheduled, 6 months, Rosacea f/u.       Staff Involved:  Staff and Scribe  Scribe Disclosure:   I, Sola Zuñiga, am serving as a scribe to document services personally performed by Nicolás Kirby MD based on data collection and the provider's statements to me.     Provider Disclosure:   The documentation recorded by the scribe accurately reflects the services I personally performed and the decisions made by me.    Nicolás Kirby MD   of Dermatology  Department of Dermatology  [facility] [employer]        CC:   Chief Complaint   Patient presents with    Rosacea     Pt here for rosacea. Pt was originally diagnosed with rosacea approximately 40 years ago. Patient has previously used metrocream with relief. She still struggles with discoloration and break outs and seeking treatment again.        History of Present Illness:  Ms. Merline Max is a 69 year old female who presents as a new patient.    Today, patient reports being diagnosed with Rosacea many years ago and used metrocream that helped. In  the last few years it has flared up and now develops pustules. Also reports areas of concern on the R hand and face.     Labs:  N/a    Physical exam:  Vitals: There were no vitals taken for this visit.  GEN: This is a well developed, well-nourished female in no acute distress, in a pleasant mood.    SKIN: Chou phototype I  - Focused examination of the face and hands was performed.  - Erythematous scaly macules on the R hand x 2, and face x 1  - Scattered erythematous papules and pustules with background erythema on the face.   - No other lesions of concern on areas examined.     Past Medical History:   Past Medical History:   Diagnosis Date    Arthritis     Fatigue 7/16/2012    Fracture     stress foot    Gout     Hyperlipidemia     Hypertension     Nephropathy 1998    IGA    Nonsenile cataract     YANCI (obstructive sleep apnea) 12/19/2023    Type 2 diabetes, HbA1c goal < 7% (H) 7/16/2012    Uveitis     iritis right eye since 2008     Past Surgical History:   Procedure Laterality Date    ABDOMEN SURGERY  1978, 1984, 1986, 1996, 1997,    3 C-Sections, Total Hysterectomy, Gallbladder Removal,    BIOPSY KIDNEY  01/01/1996    Ig A nephropathy    CHOLECYSTECTOMY  01/01/1996    COLONOSCOPY  06/18/2013    Procedure: COLONOSCOPY;  COLONOSCOPY - POLYP/ SANTILLI;  Surgeon: Ulices Gregory MD;  [facility]    COLONOSCOPY  10/01/2013    Procedure: COMBINED COLONOSCOPY, SINGLE BIOPSY/POLYPECTOMY BY BIOPSY;;  Surgeon: Ulices Gregory MD;  [facility]    COLONOSCOPY N/A 12/04/2014    Procedure: COMBINED COLONOSCOPY, SINGLE OR MULTIPLE BIOPSY/POLYPECTOMY BY BIOPSY;  Surgeon: Ulices Gregory MD;  [facility]    COLONOSCOPY N/A 6/5/2023    Procedure: COLONOSCOPY, FLEXIBLE, WITH LESION REMOVAL USING SNARE;  Surgeon: Vicki Butler DO;  [facility]    COLONOSCOPY WITH CO2 INSUFFLATION N/A 12/04/2014    Procedure: COLONOSCOPY WITH CO2 INSUFFLATION;  Surgeon: Ulices Gregory MD;   [facility]    COLONOSCOPY WITH CO2 INSUFFLATION N/A 2020    Procedure: COLONOSCOPY, WITH CO2 INSUFFLATION;  Surgeon: Eliud Huang DO;  [facility]    COLONOSCOPY WITH CO2 INSUFFLATION N/A 2023    Procedure: Colonoscopy with CO2 insufflation;  Surgeon: Vicki Butler DO;  [facility]: [facility]    FOOT SURGERY      Lt foot.     GYN SURGERY       x3    HERNIORRHAPHY UMBILICAL  1996    HYSTERECTOMY TOTAL ABDOMINAL, BILATERAL SALPINGO-OOPHORECTOMY, COMBINED  1996    cyst, endometriosis    HYSTERECTOMY, PAP NO LONGER INDICATED      SIGMOIDOSCOPY FLEXIBLE N/A 2024    Procedure: Sigmoidoscopy flexible;  Surgeon: Vicki Butler DO;  [facility]: [facility]       Social History:   reports that she has never smoked. She has never used smokeless tobacco. She reports current alcohol use. She reports that she does not use drugs.    Family History:  Family History   Problem Relation Age of Onset    Cancer Mother 64        colon     Colon Cancer Mother     Other Cancer Mother         Colon    Cancer Father         adrenal and lung    Other Cancer Father         Adrenal & Lung    Glaucoma Maternal Grandmother         corneal transplant    EYE* Maternal Grandmother         corneal transplant    Hypertension Maternal Grandmother     Cerebrovascular Disease Maternal Grandmother 85        TIA    Eye Surgery Maternal Grandmother         cataracts    Cancer Maternal Grandfather     Cancer Paternal Grandmother     Eye Surgery Brother         cataracts    C.A.D. Brother 46        quad bypass    Arrhythmia Brother     Glaucoma Brother 73    Asthma Daughter         SInce birth, small lung function    Glaucoma Daughter 40        monitoring pressure    Glaucoma Daughter 38        watching  pressure    Glaucoma Daughter 40        wachting pressure    Glaucoma Maternal Aunt     Thyroid Disease Maternal Aunt     Cancer Maternal Aunt     Breast Cancer Other 80        Maternal great aunt    Breast Cancer  Other     Diabetes No family hx of     Macular Degeneration No family hx of        Medications:  Current Outpatient Medications   Medication Sig Dispense Refill    allopurinol (ZYLOPRIM) 100 MG tablet Take 200 mg by mouth daily.      Aspirin (ASPIR-81 PO) Take 1 tablet by mouth daily.      blood glucose (BEATRIZ CONTOUR) test strip Three times daily 100 strip prn    cetirizine (ZYRTEC) 10 MG tablet Take 10 mg by mouth daily.      docusate sodium (COLACE) 100 MG capsule Take 1 capsule by mouth as needed.      hydrochlorothiazide (MICROZIDE) 12.5 MG capsule Take 1 capsule by mouth daily. 1 capsule 0    irbesartan (AVAPRO) 300 MG tablet Take 300 mg by mouth At Bedtime.      ketotifen (ZADITOR) 0.025 % SOLN 1 drop as needed.      Lancet Devices (AUTO-LANCET) MISC Three times daily 100 each prn    lancets thin MISC 1 each 2 times daily. 100 each prn    metFORMIN (GLUCOPHAGE) 500 MG tablet Take 1 tablet (500 mg) by mouth 2 times daily (with meals). Needs to be seen for further refills 60 tablet 0    methylcellulose, laxative, (CITRUCEL) POWD Take 1 ounce at bedtime PRN.      Multiple Vitamin (MULTIVITAMIN ADULT) TABS       polyethylene glycol - propylene glycol (SYSTANE) 0.4-0.3 % SOLN Place 1 drop into both eyes 2 times daily as needed.      simvastatin (ZOCOR) 20 MG tablet Take 20 mg by mouth At Bedtime.       Allergies   Allergen Reactions    Empagliflozin Other (See Comments)     Renal failure    Seasonal Allergies      Watering eyes

## 2025-03-25 ENCOUNTER — OFFICE VISIT (OUTPATIENT)
Dept: FAMILY MEDICINE | Facility: CLINIC | Age: 70
End: 2025-03-25
Payer: COMMERCIAL

## 2025-03-25 VITALS
HEART RATE: 80 BPM | OXYGEN SATURATION: 98 % | HEIGHT: 66 IN | WEIGHT: 207 LBS | SYSTOLIC BLOOD PRESSURE: 138 MMHG | TEMPERATURE: 97.7 F | RESPIRATION RATE: 16 BRPM | BODY MASS INDEX: 33.27 KG/M2 | DIASTOLIC BLOOD PRESSURE: 82 MMHG

## 2025-03-25 DIAGNOSIS — E11.22 TYPE 2 DIABETES MELLITUS WITH STAGE 3B CHRONIC KIDNEY DISEASE, WITHOUT LONG-TERM CURRENT USE OF INSULIN (H): Primary | ICD-10-CM

## 2025-03-25 DIAGNOSIS — N18.32 STAGE 3B CHRONIC KIDNEY DISEASE (H): ICD-10-CM

## 2025-03-25 DIAGNOSIS — N18.32 TYPE 2 DIABETES MELLITUS WITH STAGE 3B CHRONIC KIDNEY DISEASE, WITHOUT LONG-TERM CURRENT USE OF INSULIN (H): Primary | ICD-10-CM

## 2025-03-25 LAB
EST. AVERAGE GLUCOSE BLD GHB EST-MCNC: 154 MG/DL
HBA1C MFR BLD: 7 % (ref 0–5.6)

## 2025-03-25 PROCEDURE — 3079F DIAST BP 80-89 MM HG: CPT | Performed by: FAMILY MEDICINE

## 2025-03-25 PROCEDURE — 36415 COLL VENOUS BLD VENIPUNCTURE: CPT | Performed by: FAMILY MEDICINE

## 2025-03-25 PROCEDURE — 99214 OFFICE O/P EST MOD 30 MIN: CPT | Performed by: FAMILY MEDICINE

## 2025-03-25 PROCEDURE — 3075F SYST BP GE 130 - 139MM HG: CPT | Performed by: FAMILY MEDICINE

## 2025-03-25 PROCEDURE — 80048 BASIC METABOLIC PNL TOTAL CA: CPT | Performed by: FAMILY MEDICINE

## 2025-03-25 PROCEDURE — G2211 COMPLEX E/M VISIT ADD ON: HCPCS | Performed by: FAMILY MEDICINE

## 2025-03-25 PROCEDURE — 84100 ASSAY OF PHOSPHORUS: CPT | Performed by: FAMILY MEDICINE

## 2025-03-25 PROCEDURE — 83036 HEMOGLOBIN GLYCOSYLATED A1C: CPT | Performed by: FAMILY MEDICINE

## 2025-03-25 PROCEDURE — 82570 ASSAY OF URINE CREATININE: CPT | Performed by: FAMILY MEDICINE

## 2025-03-25 PROCEDURE — 80061 LIPID PANEL: CPT | Performed by: FAMILY MEDICINE

## 2025-03-25 PROCEDURE — 83970 ASSAY OF PARATHORMONE: CPT | Performed by: FAMILY MEDICINE

## 2025-03-25 PROCEDURE — 82043 UR ALBUMIN QUANTITATIVE: CPT | Performed by: FAMILY MEDICINE

## 2025-03-25 NOTE — PROGRESS NOTES
"  Assessment & Plan     Type 2 diabetes mellitus with stage 3b chronic kidney disease, without long-term current use of insulin (H)  Did not tolerate jardiance  Limited metformin due to ckd3b  Will do trial of ozempic  Pt to call in one month to see how she is doing on this    Stage 3b chronic kidney disease (H)  Seeing nephrology  Did not do well on jardiance  - Phosphorus; Future  - Phosphorus      The longitudinal plan of care for the diagnosis(es)/condition(s) as documented were addressed during this visit. Due to the added complexity in care, I will continue to support Merilne in the subsequent management and with ongoing continuity of care.      BMI  Estimated body mass index is 33.92 kg/m  as calculated from the following:    Height as of this encounter: 1.664 m (5' 5.5\").    Weight as of this encounter: 93.9 kg (207 lb).   Weight management plan: Discussed healthy diet and exercise guidelines          Subjective   Merline is a 69 year old, presenting for the following health issues:  Diabetes      3/25/2025    11:14 AM   Additional Questions   Roomed by marko   Accompanied by self     History of Present Illness       Reason for visit:  Would like to discuss changing med for diabetes and possibly quitting hydrochlorothiazide. Spoke with Dr Morgan, my kidney doctor, and he thought trying Ozempic would be a good idea.   She is taking medications regularly.      Diabetes well controlled  On aspirin, arb and statin  A1c at goal  BP at goal  LDL at goal  Eye exam UTD  She would like to switch to ozempic  Franko occasional numbness in feet      Review of Systems  Constitutional, HEENT, cardiovascular, pulmonary, gi and gu systems are negative, except as otherwise noted.      Objective    /82   Pulse 80   Temp 97.7  F (36.5  C) (Oral)   Resp 16   Ht 1.664 m (5' 5.5\")   Wt 93.9 kg (207 lb)   SpO2 98%   BMI 33.92 kg/m    Body mass index is 33.92 kg/m .  Physical Exam   GENERAL: alert and no distress  NECK: no " adenopathy, no asymmetry, masses, or scars  RESP: lungs clear to auscultation - no rales, rhonchi or wheezes  CV: regular rate and rhythm, normal S1 S2, no S3 or S4, no murmur, click or rub, no peripheral edema   MS: no gross musculoskeletal defects noted, no edema    No results found for this or any previous visit (from the past 24 hours).        Signed Electronically by: Tamika Brown MD

## 2025-03-26 LAB
ANION GAP SERPL CALCULATED.3IONS-SCNC: 12 MMOL/L (ref 7–15)
BUN SERPL-MCNC: 38.4 MG/DL (ref 8–23)
CALCIUM SERPL-MCNC: 10.3 MG/DL (ref 8.8–10.4)
CHLORIDE SERPL-SCNC: 107 MMOL/L (ref 98–107)
CHOLEST SERPL-MCNC: 167 MG/DL
CREAT SERPL-MCNC: 1.39 MG/DL (ref 0.51–0.95)
CREAT UR-MCNC: 60.5 MG/DL
EGFRCR SERPLBLD CKD-EPI 2021: 41 ML/MIN/1.73M2
FASTING STATUS PATIENT QL REPORTED: YES
FASTING STATUS PATIENT QL REPORTED: YES
GLUCOSE SERPL-MCNC: 152 MG/DL (ref 70–99)
HCO3 SERPL-SCNC: 23 MMOL/L (ref 22–29)
HDLC SERPL-MCNC: 41 MG/DL
LDLC SERPL CALC-MCNC: 77 MG/DL
MICROALBUMIN UR-MCNC: 901 MG/L
MICROALBUMIN/CREAT UR: 1489.26 MG/G CR (ref 0–25)
NONHDLC SERPL-MCNC: 126 MG/DL
PHOSPHATE SERPL-MCNC: 3.6 MG/DL (ref 2.5–4.5)
POTASSIUM SERPL-SCNC: 4.8 MMOL/L (ref 3.4–5.3)
PTH-INTACT SERPL-MCNC: 33 PG/ML (ref 15–65)
SODIUM SERPL-SCNC: 142 MMOL/L (ref 135–145)
TRIGL SERPL-MCNC: 244 MG/DL

## 2025-05-05 ENCOUNTER — DOCUMENTATION ONLY (OUTPATIENT)
Dept: SLEEP MEDICINE | Facility: CLINIC | Age: 70
End: 2025-05-05
Payer: COMMERCIAL

## 2025-05-05 DIAGNOSIS — G47.33 OBSTRUCTIVE SLEEP APNEA (ADULT) (PEDIATRIC): Primary | ICD-10-CM

## 2025-05-14 ENCOUNTER — MYC MEDICAL ADVICE (OUTPATIENT)
Dept: FAMILY MEDICINE | Facility: CLINIC | Age: 70
End: 2025-05-14
Payer: COMMERCIAL

## 2025-05-14 DIAGNOSIS — N18.31 TYPE 2 DIABETES MELLITUS WITH STAGE 3A CHRONIC KIDNEY DISEASE, WITHOUT LONG-TERM CURRENT USE OF INSULIN (H): ICD-10-CM

## 2025-05-14 DIAGNOSIS — E11.22 TYPE 2 DIABETES MELLITUS WITH STAGE 3A CHRONIC KIDNEY DISEASE, WITHOUT LONG-TERM CURRENT USE OF INSULIN (H): ICD-10-CM

## 2025-06-04 ENCOUNTER — MYC MEDICAL ADVICE (OUTPATIENT)
Dept: FAMILY MEDICINE | Facility: CLINIC | Age: 70
End: 2025-06-04
Payer: COMMERCIAL

## 2025-06-04 DIAGNOSIS — E11.22 TYPE 2 DIABETES MELLITUS WITH STAGE 3A CHRONIC KIDNEY DISEASE, WITHOUT LONG-TERM CURRENT USE OF INSULIN (H): ICD-10-CM

## 2025-06-04 DIAGNOSIS — N18.31 TYPE 2 DIABETES MELLITUS WITH STAGE 3A CHRONIC KIDNEY DISEASE, WITHOUT LONG-TERM CURRENT USE OF INSULIN (H): ICD-10-CM

## 2025-06-04 NOTE — TELEPHONE ENCOUNTER
Routing to provider - Pt was seen in office 3/25/25. Please see mychart message from pt and advise. Pharmacy pended, if appropriate.     Thank you - Mari Figueredo, BSN, RN    kk

## 2025-07-07 ENCOUNTER — OFFICE VISIT (OUTPATIENT)
Dept: DERMATOLOGY | Facility: CLINIC | Age: 70
End: 2025-07-07
Payer: COMMERCIAL

## 2025-07-07 DIAGNOSIS — D18.01 CHERRY ANGIOMA: ICD-10-CM

## 2025-07-07 DIAGNOSIS — D22.9 MULTIPLE BENIGN NEVI: Primary | ICD-10-CM

## 2025-07-07 DIAGNOSIS — L81.4 LENTIGINES: ICD-10-CM

## 2025-07-07 DIAGNOSIS — L72.0 MILIA: ICD-10-CM

## 2025-07-07 DIAGNOSIS — D48.5 NEOPLASM OF UNCERTAIN BEHAVIOR OF SKIN: ICD-10-CM

## 2025-07-07 DIAGNOSIS — L82.1 SK (SEBORRHEIC KERATOSIS): ICD-10-CM

## 2025-07-07 DIAGNOSIS — Z12.83 SCREENING EXAM FOR SKIN CANCER: ICD-10-CM

## 2025-07-07 PROCEDURE — 88305 TISSUE EXAM BY PATHOLOGIST: CPT | Performed by: DERMATOLOGY

## 2025-07-07 NOTE — LETTER
7/7/2025      Merline Max  [id number]  [facility] [address]      Dear Colleague,    Thank you for referring your patient, Merline Max, to the [facility] [place]. Please see a copy of my visit note below.    [facility] Note  Encounter Date: Jul 7, 2025  Office Visit      Dermatology Problem List:  FBSE: 7/7/25    #NUB x 2 , S/p Biopsy performed on 7/7/25, pending results.   1. Rosacea  - current tx: metrocream  2. AKs - s/p cryo  ____________________________________________    Assessment & Plan:    # Neoplasm of unspecified behavior of the skin (D49.2) on the R lower cheek. The differential diagnosis includes NMSC vs other. .   - Shave biopsy performed today, see procedure note below.   - Photographed today     # Milia - L cheek  Patient reassured benign, no need for further intervention at this time.  Discussed option of extraction but patient defers.  Discussed with patient that these typically resolve on their own without intervention but may take several weeks to months in some cases.    # Neoplasm of unspecified behavior of the skin (D49.2) on the L forearm. The differential diagnosis includes Pigmented AK vs Pigmented BCC . .   - Shave biopsy performed today, see procedure note below.   - Photographed today     # Benign findings: multiple benign nevi, lentigines, cherry angiomas, SKs  - edu on benign etiology  - Signs and Symptoms of non-melanoma skin cancer and ABCDEs of melanoma reviewed with patient. Patient encouraged to perform monthly self skin exams and educated on how to perform them. UV precautions reviewed with patient. Patient was asked about new or changing moles/lesions on body.   - Sunscreen: Apply 20 minutes prior to going outdoors and reapply every two hours, when wet or sweating. We recommend using an SPF 30 or higher, and to use one that is water resistant.     - RTC for changes     Procedures Performed:   - Shave biopsy procedure  note, x 2  location(s): see above. After discussion of benefits and risks including but not limited to bleeding, infection, scar, incomplete removal, recurrence, and non-diagnostic biopsy, written consent and photographs were obtained. The area was cleaned with isopropyl alcohol. 0.5mL of 1% lidocaine with epinephrine was injected to obtain adequate anesthesia of lesion(s). Shave biopsy at site(s) performed. Hemostasis was achieved with aluminium chloride. Petrolatum ointment and a sterile dressing were applied. The patient tolerated the procedure and no complications were noted. The patient was provided with verbal and written post care instructions.      Follow-up: pending path results    Staff and scribe:     Scribe Disclosure:   I, NICOLASA RODRÍGUEZ, am serving as a scribe; to document services personally performed by Idalia Garcia PA-C -based on data collection and the provider's statements to me.     Provider Disclosure:  I agree with above History, Review of Systems, Physical exam and Plan.  I have reviewed the content of the documentation and have edited it as needed. I have personally performed the services documented here and the documentation accurately represents those services and the decisions I have made.      Electronically signed by:    All risks, benefits and alternatives were discussed with patient.  Patient is in agreement and understands the assessment and plan.  All questions were answered.    Idalia Garcia PA-C, MPAS  [facility]: Phone: [phone], Fax: [phone]  [facility]: Phone: [phone],  Fax: [phone]  [facility]: Phone: [phone], Fax: [phone]  ____________________________________________    CC: Skin Check (FBSC, no concerns)      Reviewed patients past medical history and pertinent chart review prior to patient's visit today.     HPI:  Ms. Merline Max  is a 69 year old female who presents today as a return patient for FBSE. No personal or family hx of skin cancer.  Does have history of rosacea for which she uses MetroCream.    No areas of concern noted today. She denies any painful, bleeding, or not healing lesions. She does not have any new changes to her health.     Patient is otherwise feeling well, without additional concerns.    Labs:  N/A    Physical Exam:  Vitals: There were no vitals taken for this visit.  SKIN: Total skin excluding the undergarment areas was performed. The exam included the head/face, neck, both arms, chest, back, abdomen, both legs, digits and/or nails.    - Chou's skin type I-II, has <100 nevi  - There are dome shaped bright red papules on the trunk.   - Multiple regular brown pigmented macules and papules are identified on the trunk and extremities.   - Scattered brown macules on sun exposed areas.  - There are waxy stuck on tan to brown papules on the trunk.     - L forearm there is a 8 mm pink and brown scaly macule with dotted vessiles   - R lower cheek there is a 9 mm pink shiny papule    - There are white 1-2 mm papules on the L cheek x 1. .   - No other lesions of concern on areas examined.      R lower cheek         L forearm           Medications:  Current Outpatient Medications   Medication Sig Dispense Refill     allopurinol (ZYLOPRIM) 100 MG tablet Take 200 mg by mouth daily.       Aspirin (ASPIR-81 PO) Take 1 tablet by mouth daily.       blood glucose (NO BRAND SPECIFIED) lancets standard Use to test blood sugar one time daily or as directed. 100 each 2     blood glucose (NO BRAND SPECIFIED) test strip Use to test blood sugar one time daily or as directed. 100 strip 2     blood glucose (NO BRAND SPECIFIED) test strip Use to test blood sugar 1 times daily or as directed. 100 strip 2     Blood Glucose Monitoring Suppl (CONTOUR BLOOD GLUCOSE SYSTEM) w/Device KIT Use to test blood sugar one time daily or as directed. 1  kit 0     cetirizine (ZYRTEC) 10 MG tablet Take 10 mg by mouth daily.       docusate sodium (COLACE) 100 MG capsule Take 1 capsule by mouth as needed.       irbesartan (AVAPRO) 300 MG tablet Take 300 mg by mouth At Bedtime.       ketotifen (ZADITOR) 0.025 % SOLN 1 drop as needed.       Lancet Devices (AUTO-LANCET) MISC Three times daily 100 each prn     lancets thin MISC 1 each 2 times daily. 100 each prn     methylcellulose, laxative, (CITRUCEL) POWD Take 1 ounce at bedtime PRN.       metroNIDAZOLE (METROCREAM) 0.75 % external cream Apply topically 2 times daily. 45 g 11     Multiple Vitamin (MULTIVITAMIN ADULT) TABS        polyethylene glycol - propylene glycol (SYSTANE) 0.4-0.3 % SOLN Place 1 drop into both eyes 2 times daily as needed.       Semaglutide, 2 MG/DOSE, (OZEMPIC) 8 MG/3ML pen Inject 2 mg subcutaneously every 7 days. 9 mL 0     simvastatin (ZOCOR) 20 MG tablet Take 20 mg by mouth At Bedtime.       Semaglutide, 1 MG/DOSE, (OZEMPIC) 4 MG/3ML pen Inject 1 mg subcutaneously every 7 days. 3 mL 0     No current facility-administered medications for this visit.      Past Medical/Surgical History:   Patient Active Problem List   Diagnosis     Dry eye syndrome     Recurrent iritis right eye     Ocular hypertension     Type 2 diabetes mellitus with stage 3a chronic kidney disease, without long-term current use of insulin (H)     Blurred vision     Gouty arthritis     Essential hypertension, benign     IgA nephropathy     Hyperlipidemia LDL goal <100     Fatigue     Elevated LFTs     Colon polyp     Acute iritis left eye     Stage 3b chronic kidney disease (H)     Family history of premature coronary artery disease     Acute bilateral low back pain with left-sided sciatica     YANCI (obstructive sleep apnea)     Past Medical History:   Diagnosis Date     Arthritis      Fatigue 7/16/2012     Fracture     stress foot     Gout      Hyperlipidemia      Hypertension      Nephropathy 1998    IGA     Nonsenile cataract       YANCI (obstructive sleep apnea) 12/19/2023     Type 2 diabetes, HbA1c goal < 7% (H) 7/16/2012     Uveitis     iritis right eye since 2008                        Again, thank you for allowing me to participate in the care of your patient.        Sincerely,        Idalia Garcia PA-C    Electronically signed

## 2025-07-07 NOTE — PATIENT INSTRUCTIONS
Wound Care After a Biopsy    What is a skin biopsy?  A skin biopsy allows the doctor to examine a very small piece of tissue under the microscope to determine the diagnosis and the best treatment for the skin condition. A local anesthetic (numbing medicine)  is injected with a very small needle into the skin area to be tested. A small piece of skin is taken from the area. Sometimes a suture (stitch) is used.     What are the risks of a skin biopsy?  I will experience scar, bleeding, swelling, pain, crusting and redness. I may experience incomplete removal or recurrence. Risks of this procedure are excessive bleeding, bruising, infection, nerve damage, numbness, thick (hypertrophic or keloidal) scar and non-diagnostic biopsy.    How should I care for my wound for the first 24 hours?  Keep the wound dry and covered for 24 hours  If it bleeds, hold direct pressure on the area for 15 minutes. If bleeding does not stop then go to the emergency room  Avoid strenuous exercise the first 1-2 days or as your doctor instructs you    How should I care for the wound after 24 hours?  After 24 hours, remove the bandage  You may bathe or shower as normal  If you had a scalp biopsy, you can shampoo as usual and can use shower water to clean the biopsy site daily  Clean the wound twice a day with gentle soap and water  Do not scrub, be gentle  Apply white petroleum/Vaseline after cleaning the wound with a cotton swab or a clean finger, and keep the site covered with a Bandaid /bandage. Bandages are not necessary with a scalp biopsy  If you are unable to cover the site with a Bandaid /bandage, re-apply ointment 2-3 times a day to keep the site moist. Moisture will help with healing  Avoid strenuous activity for first 1-2 days  Avoid lakes, rivers, pools, and oceans until the stitches are removed or the site is healed    How do I clean my wound?  Wash hands thoroughly with soap or use hand  before all wound care  Clean the  wound with gentle soap and water  Apply white petroleum/Vaseline  to wound after it is clean  Replace the Bandaid /bandage to keep the wound covered for the first few days or as instructed by your doctor  If you had a scalp biopsy, warm shower water to the area on a daily basis should suffice    What should I use to clean my wound?   Cotton-tipped applicators (Qtips )  White petroleum jelly (Vaseline ). Use a clean new container and use Q-tips to apply.  Bandaids   as needed  Gentle soap     How should I care for my wound long term?  Do not get your wound dirty  Keep up with wound care for one week or until the area is healed.  A small scab will form and fall off by itself when the area is completely healed. The area will be red and will become pink in color as it heals. Sun protection is very important for how your scar will turn out. Sunscreen with an SPF 30 or greater is recommended once the area is healed.  If you have stitches, stitches need to be removed in 10 days. You may return to our clinic for this or you may have it done locally at your doctor s office.  You should have some soreness but it should be mild and slowly go away over several days. Talk to your doctor about using tylenol for pain,    When should I call my doctor?  If you have increased:   Pain or swelling  Pus or drainage (clear or slightly yellow drainage is ok)  Temperature over 100F  Spreading redness or warmth around wound    When will I hear about my results?  The biopsy results can take 2-3 weeks to come back. The clinic will call you with the results, send you a [employer] message, or have you schedule a follow-up clinic or phone time to discuss the results. Contact our clinics if you do not hear from us in 3 weeks.     Who should I call with questions?  [facility]: [phone]   [facility]: [phone]  For urgent needs outside of business hours call the [facility]  at [phone] and ask for the dermatology resident on call         Proper skin care from [facility]:    -Eliminate harsh soaps as they strip the natural oils from the skin, often resulting in dry itchy skin ( i.e. Dial, Zest, [place])  -Use mild soaps such as Cetaphil or Dove Sensitive Skin in the shower. You do not need to use soap on arms, legs, and trunk every time you shower unless visibly soiled.   -Avoid hot or cold showers.  -After showering, lightly dry off and apply moisturizing within 2-3 minutes. This will help trap moisture in the skin.   -Aggressive use of a moisturizer at least 1-2 times a day to the entire body (including -Vanicream, Cetaphil, Aquaphor or Cerave) and moisturize hands after every washing.  -We recommend using moisturizers that come in a tub that needs to be scooped out, not a pump. This has more of an oil base. It will hold moisture in your skin much better than a water base moisturizer. The above recommended are non-pore clogging.      Wear a sunscreen with at least SPF 30 on your face, ears, neck and V of the chest daily. Wear sunscreen on other areas of the body if those areas are exposed to the sun throughout the day. Sunscreens can contain physical and/or chemical blockers. Physical blockers are less likely to clog pores, these include zinc oxide and titanium dioxide. Reapply every two hour and after swimming.     Sunscreen examples: https://www.ewg.org/sunscreen/    UV radiation  UVA radiation remains constant throughout the day and throughout the year. It is a longer wavelength than UVB and therefore penetrates deeper into the skin leading to immediate and delayed tanning, photoaging, and skin cancer. 70-80% of UVA and UVB radiation occurs between the hours of 10am-2pm.  UVB radiation  UVB radiation causes the most harmful effects and is more significant during the summer months. However, snow and ice can reflect UVB radiation leading to skin damage during the  winter months as well. UVB radiation is responsible for tanning, burning, inflammation, delayed erythema (pinkness), pigmentation (brown spots), and skin cancer.     I recommend self monthly full body exams and yearly full body exams with a dermatology provider. If you develop a new or changing lesion please follow up for examination. Most skin cancers are pink and scaly or pink and pearly. However, we do see blue/brown/black skin cancers.  Consider the ABCDEs of melanoma when giving yourself your monthly full body exam ( don't forget the groin, buttocks, feet, toes, etc). A-asymmetry, B-borders, C-color, D-diameter, E-elevation or evolving. If you see any of these changes please follow up in clinic. If you cannot see your back I recommend purchasing a hand held mirror to use with a larger wall mirror.       Checking for Skin Cancer  You can find cancer early by checking your skin each month. There are 3 kinds of skin cancer. They are melanoma, basal cell carcinoma, and squamous cell carcinoma. Doing monthly skin checks is the best way to find new marks or skin changes. Follow the instructions below for checking your skin.   The ABCDEs of checking moles for melanoma   Check your moles or growths for signs of melanoma using ABCDE:   Asymmetry: the sides of the mole or growth don t match  Border: the edges are ragged, notched, or blurred  Color: the color within the mole or growth varies  Diameter: the mole or growth is larger than 6 mm (size of a pencil eraser)  Evolving: the size, shape, or color of the mole or growth is changing (evolving is not shown in the images below)    Checking for other types of skin cancer  Basal cell carcinoma or squamous cell carcinoma have symptoms such as:     A spot or mole that looks different from all other marks on your skin  Changes in how an area feels, such as itching, tenderness, or pain  Changes in the skin's surface, such as oozing, bleeding, or scaliness  A sore that does not  heal  New swelling or redness beyond the border of a mole    Who s at risk?  Anyone can get skin cancer. But you are at greater risk if you have:   Fair skin, light-colored hair, or light-colored eyes  Many moles or abnormal moles on your skin  A history of sunburns from sunlight or tanning beds  A family history of skin cancer  A history of exposure to radiation or chemicals  A weakened immune system  If you have had skin cancer in the past, you are at risk for recurring skin cancer.   How to check your skin  Do your monthly skin checkups in front of a full-length mirror. Check all parts of your body, including your:   Head (ears, face, neck, and scalp)  Torso (front, back, and sides)  Arms (tops, undersides, upper, and lower armpits)  Hands (palms, backs, and fingers, including under the nails)  Buttocks and genitals  Legs (front, back, and sides)  Feet (tops, soles, toes, including under the nails, and between toes)  If you have a lot of moles, take digital photos of them each month. Make sure to take photos both up close and from a distance. These can help you see if any moles change over time.   Most skin changes are not cancer. But if you see any changes in your skin, call your doctor right away. Only he or she can diagnose a problem. If you have skin cancer, seeing your doctor can be the first step toward getting the treatment that could save your life.   [employer] last reviewed this educational content on 4/1/2019 2000-2020 [employer]. [address] [place] [address], [place] [address]. All rights reserved. This information is not intended as a substitute for professional medical care. Always follow your healthcare professional's instructions.       When should I call my doctor?  If you are worsening or not improving, please, contact us or seek urgent care as noted below.     Who should I call with questions (adults)?    [facility] [place] [phone]  For urgent needs  outside of business hours call the [facility] at [phone] and ask for the dermatology resident on call to be paged  If this is a medical emergency and you are unable to reach an ER, Call [phone]      If you need a prescription refill, please contact your pharmacy. Refills are approved or denied by our Physicians during normal business hours, Monday through Friday.  Per office policy, refills will not be granted if you have not been seen within the past year (or sooner depending on the condition).

## 2025-07-07 NOTE — PROGRESS NOTES
[facility] Dermatology Note  Encounter Date: Jul 7, 2025  Office Visit      Dermatology Problem List:  FBSE: 7/7/25    #NUB x 2 , S/p Biopsy performed on 7/7/25, pending results.   1. Rosacea  - current tx: metrocream  2. AKs - s/p cryo  ____________________________________________    Assessment & Plan:    # Neoplasm of unspecified behavior of the skin (D49.2) on the R lower cheek. The differential diagnosis includes NMSC vs other. .   - Shave biopsy performed today, see procedure note below.   - Photographed today     # Milia - L cheek  Patient reassured benign, no need for further intervention at this time.  Discussed option of extraction but patient defers.  Discussed with patient that these typically resolve on their own without intervention but may take several weeks to months in some cases.    # Neoplasm of unspecified behavior of the skin (D49.2) on the L forearm. The differential diagnosis includes Pigmented AK vs Pigmented BCC . .   - Shave biopsy performed today, see procedure note below.   - Photographed today     # Benign findings: multiple benign nevi, lentigines, cherry angiomas, SKs  - edu on benign etiology  - Signs and Symptoms of non-melanoma skin cancer and ABCDEs of melanoma reviewed with patient. Patient encouraged to perform monthly self skin exams and educated on how to perform them. UV precautions reviewed with patient. Patient was asked about new or changing moles/lesions on body.   - Sunscreen: Apply 20 minutes prior to going outdoors and reapply every two hours, when wet or sweating. We recommend using an SPF 30 or higher, and to use one that is water resistant.     - RTC for changes     Procedures Performed:   - Shave biopsy procedure note, x 2  location(s): see above. After discussion of benefits and risks including but not limited to bleeding, infection, scar, incomplete removal, recurrence, and non-diagnostic biopsy, written consent and photographs were obtained.  The area was cleaned with isopropyl alcohol. 0.5mL of 1% lidocaine with epinephrine was injected to obtain adequate anesthesia of lesion(s). Shave biopsy at site(s) performed. Hemostasis was achieved with aluminium chloride. Petrolatum ointment and a sterile dressing were applied. The patient tolerated the procedure and no complications were noted. The patient was provided with verbal and written post care instructions.      Follow-up: pending path results    Staff and scribe:     Scribe Disclosure:   I, NICOLASA RODRÍGUEZ, am serving as a scribe; to document services personally performed by Idalia Garcia PA-C -based on data collection and the provider's statements to me.     Provider Disclosure:  I agree with above History, Review of Systems, Physical exam and Plan.  I have reviewed the content of the documentation and have edited it as needed. I have personally performed the services documented here and the documentation accurately represents those services and the decisions I have made.      Electronically signed by:    All risks, benefits and alternatives were discussed with patient.  Patient is in agreement and understands the assessment and plan.  All questions were answered.    Idalia Garcia PA-C, [facility]  [facility] [place]: Phone: [phone], Fax: [phone]  [facility]: Phone: [phone],  Fax: [phone]  [facility]: Phone: [phone], Fax: [phone]  ____________________________________________    CC: Skin Check (FBSC, no concerns)      Reviewed patients past medical history and pertinent chart review prior to patient's visit today.     HPI:  Ms. Merline Max is a 69 year old female who presents today as a return patient for FBSE. No personal or family hx of skin cancer.  Does have history of rosacea for which she uses MetroCream.    No areas of concern noted today. She denies any painful,  bleeding, or not healing lesions. She does not have any new changes to her health.     Patient is otherwise feeling well, without additional concerns.    Labs:  N/A    Physical Exam:  Vitals: There were no vitals taken for this visit.  SKIN: Total skin excluding the undergarment areas was performed. The exam included the head/face, neck, both arms, chest, back, abdomen, both legs, digits and/or nails.    - Chou's skin type I-II, has <100 nevi  - There are dome shaped bright red papules on the trunk.   - Multiple regular brown pigmented macules and papules are identified on the trunk and extremities.   - Scattered brown macules on sun exposed areas.  - There are waxy stuck on tan to brown papules on the trunk.     - L forearm there is a 8 mm pink and brown scaly macule with dotted vessiles   - R lower cheek there is a 9 mm pink shiny papule    - There are white 1-2 mm papules on the L cheek x 1. .   - No other lesions of concern on areas examined.      R lower cheek         L forearm           Medications:  Current Outpatient Medications   Medication Sig Dispense Refill    allopurinol (ZYLOPRIM) 100 MG tablet Take 200 mg by mouth daily.      Aspirin (ASPIR-81 PO) Take 1 tablet by mouth daily.      blood glucose (NO BRAND SPECIFIED) lancets standard Use to test blood sugar one time daily or as directed. 100 each 2    blood glucose (NO BRAND SPECIFIED) test strip Use to test blood sugar one time daily or as directed. 100 strip 2    blood glucose (NO BRAND SPECIFIED) test strip Use to test blood sugar 1 times daily or as directed. 100 strip 2    Blood Glucose Monitoring Suppl (CONTOUR BLOOD GLUCOSE SYSTEM) w/Device KIT Use to test blood sugar one time daily or as directed. 1 kit 0    cetirizine (ZYRTEC) 10 MG tablet Take 10 mg by mouth daily.      docusate sodium (COLACE) 100 MG capsule Take 1 capsule by mouth as needed.      irbesartan (AVAPRO) 300 MG tablet Take 300 mg by mouth At Bedtime.      ketotifen  (ZADITOR) 0.025 % SOLN 1 drop as needed.      Lancet Devices (AUTO-LANCET) MISC Three times daily 100 each prn    lancets thin MISC 1 each 2 times daily. 100 each prn    methylcellulose, laxative, (CITRUCEL) POWD Take 1 ounce at bedtime PRN.      metroNIDAZOLE (METROCREAM) 0.75 % external cream Apply topically 2 times daily. 45 g 11    Multiple Vitamin (MULTIVITAMIN ADULT) TABS       polyethylene glycol - propylene glycol (SYSTANE) 0.4-0.3 % SOLN Place 1 drop into both eyes 2 times daily as needed.      Semaglutide, 2 MG/DOSE, (OZEMPIC) 8 MG/3ML pen Inject 2 mg subcutaneously every 7 days. 9 mL 0    simvastatin (ZOCOR) 20 MG tablet Take 20 mg by mouth At Bedtime.      Semaglutide, 1 MG/DOSE, (OZEMPIC) 4 MG/3ML pen Inject 1 mg subcutaneously every 7 days. 3 mL 0     No current facility-administered medications for this visit.      Past Medical/Surgical History:   Patient Active Problem List   Diagnosis    Dry eye syndrome    Recurrent iritis right eye    Ocular hypertension    Type 2 diabetes mellitus with stage 3a chronic kidney disease, without long-term current use of insulin (H)    Blurred vision    Gouty arthritis    Essential hypertension, benign    IgA nephropathy    Hyperlipidemia LDL goal <100    Fatigue    Elevated LFTs    Colon polyp    Acute iritis left eye    Stage 3b chronic kidney disease (H)    Family history of premature coronary artery disease    Acute bilateral low back pain with left-sided sciatica    YANCI (obstructive sleep apnea)     Past Medical History:   Diagnosis Date    Arthritis     Fatigue 7/16/2012    Fracture     stress foot    Gout     Hyperlipidemia     Hypertension     Nephropathy 1998    IGA    Nonsenile cataract     YANCI (obstructive sleep apnea) 12/19/2023    Type 2 diabetes, HbA1c goal < 7% (H) 7/16/2012    Uveitis     iritis right eye since 2008

## 2025-07-09 LAB
PATH REPORT.COMMENTS IMP SPEC: ABNORMAL
PATH REPORT.COMMENTS IMP SPEC: ABNORMAL
PATH REPORT.COMMENTS IMP SPEC: YES
PATH REPORT.FINAL DX SPEC: ABNORMAL
PATH REPORT.GROSS SPEC: ABNORMAL
PATH REPORT.MICROSCOPIC SPEC OTHER STN: ABNORMAL
PATH REPORT.RELEVANT HX SPEC: ABNORMAL

## 2025-07-11 ENCOUNTER — TELEPHONE (OUTPATIENT)
Dept: DERMATOLOGY | Facility: CLINIC | Age: 70
End: 2025-07-11
Payer: COMMERCIAL

## 2025-07-11 NOTE — TELEPHONE ENCOUNTER
Called pt from derm surg referral work que, no answer LVM for her to return our call at [phone].    Needs excision scheduled and checklist completed.    Next opening for excision 9/9 at 9am, 1:30, or 2pm with Nannette.      Left forearm:  - Squamous cell carcinoma in situ    Patricia Cade RN on 7/11/2025 at 2:53 PM

## 2025-07-16 ENCOUNTER — MYC MEDICAL ADVICE (OUTPATIENT)
Dept: DERMATOLOGY | Facility: CLINIC | Age: 70
End: 2025-07-16
Payer: COMMERCIAL

## 2025-07-16 NOTE — CONFIDENTIAL NOTE
Excision/Mohs previsit information                                                    Diagnosis: squamous cell carcinoma in situ  Site(s): Left forearm     Is the surgical site below the waist?  NO  If yes, instruct the patient to purchase over the counter chlorhexidine surgical soap and wash all skin below the belly button twice before surgery    Allergies   Allergen Reactions    Empagliflozin Other (See Comments)     Renal failure    Seasonal Allergies      Watering eyes       Review and update allergy and medication list    Do you take the following medications:  Coumadin, Eliquis, Pradaxa, Xarelto:  NO.  If on Coumadin, INR should be checked within 7 days of surgery.  Range should be 3.5 or less or within therapeutic range.    Do you currently or have you previously had any of the following conditions:  Hepatitis:  NO  HIV/AIDS:  NO  Prolonged bleeding or bleeding disorder:  NO  Pacemaker: NO  Defibrillator:  NO  History of artificial or heart valve replacement:  NO  Endocarditis (inflammation of the inner lining of the heart's chambers and valves):  NO  Have you ever had a prosthetic joint infection:  NO  Pregnant or Breastfeeding:  N/A  Mobility device (wheelchair, transfer difficulty): NO    Important Reminders:                                                      -For Mohs, notify patient they may be here through the lunch hour.  Be prepared to have down time and we recommend they bring a book or something to do.  -Ok to take all of their medications as prescribed  -Notify patient to eat prior to appointment.  The medication is more likely to cause you to feel jittery if you have an empty stomach.  -If face is being treated, please come with a make-up free face  -Avoid wearing earrings and necklaces  -If scalp is being treated, please come with clean hair free from product  -Patient will not be able to get the site wet for 48 hrs  -No submerging wound in standing water (lake, pool, bathtub, hot tub) for 2  weeks  -No physical activity for 48 hrs (further restrictions will be discussed by MD at time of visit)    If any positives, send to RN for further review  Day Burroughs

## 2025-07-16 NOTE — TELEPHONE ENCOUNTER
2nd attempt to reach pt no answer, LVM for her to return our call at [phone].    Patricia Cade RN on 7/16/2025 at 9:01 AM

## 2025-07-28 SDOH — HEALTH STABILITY: PHYSICAL HEALTH: ON AVERAGE, HOW MANY MINUTES DO YOU ENGAGE IN EXERCISE AT THIS LEVEL?: 40 MIN

## 2025-07-28 SDOH — HEALTH STABILITY: PHYSICAL HEALTH: ON AVERAGE, HOW MANY DAYS PER WEEK DO YOU ENGAGE IN MODERATE TO STRENUOUS EXERCISE (LIKE A BRISK WALK)?: 2 DAYS

## 2025-07-28 ASSESSMENT — SOCIAL DETERMINANTS OF HEALTH (SDOH): HOW OFTEN DO YOU GET TOGETHER WITH FRIENDS OR RELATIVES?: TWICE A WEEK

## 2025-07-30 ENCOUNTER — OFFICE VISIT (OUTPATIENT)
Dept: FAMILY MEDICINE | Facility: CLINIC | Age: 70
End: 2025-07-30
Attending: FAMILY MEDICINE
Payer: COMMERCIAL

## 2025-07-30 VITALS
RESPIRATION RATE: 17 BRPM | OXYGEN SATURATION: 98 % | DIASTOLIC BLOOD PRESSURE: 77 MMHG | HEIGHT: 65 IN | HEART RATE: 84 BPM | SYSTOLIC BLOOD PRESSURE: 130 MMHG | BODY MASS INDEX: 32.82 KG/M2 | WEIGHT: 197 LBS | TEMPERATURE: 98.3 F

## 2025-07-30 DIAGNOSIS — G47.33 OSA (OBSTRUCTIVE SLEEP APNEA): ICD-10-CM

## 2025-07-30 DIAGNOSIS — E11.22 TYPE 2 DIABETES MELLITUS WITH STAGE 3B CHRONIC KIDNEY DISEASE, WITHOUT LONG-TERM CURRENT USE OF INSULIN (H): ICD-10-CM

## 2025-07-30 DIAGNOSIS — Z00.00 ENCOUNTER FOR MEDICARE ANNUAL WELLNESS EXAM: Primary | ICD-10-CM

## 2025-07-30 DIAGNOSIS — N18.32 STAGE 3B CHRONIC KIDNEY DISEASE (H): ICD-10-CM

## 2025-07-30 DIAGNOSIS — I10 ESSENTIAL HYPERTENSION, BENIGN: ICD-10-CM

## 2025-07-30 DIAGNOSIS — E78.5 HYPERLIPIDEMIA LDL GOAL <100: ICD-10-CM

## 2025-07-30 DIAGNOSIS — K63.5 POLYP OF COLON, UNSPECIFIED PART OF COLON, UNSPECIFIED TYPE: ICD-10-CM

## 2025-07-30 DIAGNOSIS — N02.B9 IGA NEPHROPATHY: ICD-10-CM

## 2025-07-30 DIAGNOSIS — M85.852 OSTEOPENIA OF LEFT HIP: ICD-10-CM

## 2025-07-30 DIAGNOSIS — N18.32 TYPE 2 DIABETES MELLITUS WITH STAGE 3B CHRONIC KIDNEY DISEASE, WITHOUT LONG-TERM CURRENT USE OF INSULIN (H): ICD-10-CM

## 2025-07-30 DIAGNOSIS — M10.9 GOUTY ARTHRITIS: ICD-10-CM

## 2025-07-30 DIAGNOSIS — Z23 NEED FOR VACCINATION: ICD-10-CM

## 2025-07-30 LAB
EST. AVERAGE GLUCOSE BLD GHB EST-MCNC: 123 MG/DL
HBA1C MFR BLD: 5.9 % (ref 0–5.6)
HGB BLD-MCNC: 11.3 G/DL (ref 11.7–15.7)
MCV RBC AUTO: 96 FL (ref 78–100)

## 2025-07-30 ASSESSMENT — PAIN SCALES - GENERAL: PAINLEVEL_OUTOF10: NO PAIN (0)

## 2025-07-30 NOTE — PATIENT INSTRUCTIONS
"Patient Education   Preventive Care Advice   This is general advice we often give to help people stay healthy. Your care team may have specific advice just for you. Please talk to your care team about your own preventive care needs.  Lifestyle  Exercise at least 150 minutes each week (30 minutes a day, 5 days a week).  Do muscle strengthening activities 2 days a week. These help control your weight and prevent disease.  No smoking.  Wear sunscreen to prevent skin cancer.  Take time with family and friends.  Have your home tested for radon every 2 to 5 years. Radon is a colorless, odorless gas that can harm your lungs. To learn more, go to www.health.[facility].[place].us and search for \"Radon in Homes.\"  Keep guns unloaded and locked up in a safe place like a safe or gun vault, or, use a gun lock and hide the keys. Always lock away bullets separately. To learn more, visit [employer].[place].gov and search for \"safe gun storage.\"  Nutrition  Eat 5 or more servings of fruits and vegetables each day.  Try wheat bread, brown rice and whole grain pasta (instead of white bread, rice, and pasta).  Get enough calcium and vitamin D. Check the label on foods and aim for 100% of the RDA (recommended daily allowance).  Regular exams  Have a dental exam and cleaning every 6 months.  Older adults: Ask your care team how often to have memory testing.  See your health care team every year to talk about:  Any changes in your health.  Any medicines your care team has prescribed.  Preventive care, family planning, and ways to prevent chronic diseases.  Shots (vaccines)   HPV shots (up to age 26), if you've never had them before.  Hepatitis B shots (up to age 59), if you've never had them before.  COVID-19 shot: Get this shot when it's due.  Flu shot: Get a flu shot every year.  Tetanus shot: Get a tetanus shot every 10 years.  Pneumococcal, hepatitis A, and RSV shots: Ask your care team if you need these based on your risk.  Shingles shot (for age 50 and " up).  General health tests  Diabetes screening:  Starting at age 35, Get screened for diabetes at least every 3 years.  If you are younger than age 35, ask your care team if you should be screened for diabetes.  Cholesterol test: At age 39, start having a cholesterol test every 5 years, or more often if advised.  Bone density scan (DEXA): At age 50, ask your care team if you should have this scan for osteoporosis (brittle bones).  Hepatitis C: Get tested at least once in your life.  Abdominal aortic aneurysm screening: Talk to your doctor about having this screening if you:  Have ever smoked; and  Are biologically male; and  Are between the ages of 65 and 75.  STIs (sexually transmitted infections)  Before age 24: Ask your care team if you should be screened for STIs.  After age 24: Get screened for STIs if you're at risk. You are at risk for STIs (including HIV) if:  You are sexually active with more than one person.  You don't use condoms every time.  You or a partner was diagnosed with a sexually transmitted infection.  If you are at risk for HIV, ask about PrEP medicine to prevent HIV.  Get tested for HIV at least once in your life, whether you are at risk for HIV or not.  Cancer screening tests  Cervical cancer screening: If you have a cervix, begin getting regular cervical cancer screening tests at age 21. Most people who have regular screenings with normal results can stop after age 65. Talk about this with your provider.  Breast cancer scan (mammogram): If you've ever had breasts, begin having regular mammograms starting at age 40. This is a scan to check for breast cancer.  Colon cancer screening: It is important to start screening for colon cancer at age 45.  Have a colonoscopy test every 10 years (or more often if you're at risk) Or, ask your provider about stool tests like a FIT test every year or Cologuard test every 3 years.  To learn more about your testing options, visit:  www.ngmoco/204861.pdf.  For help making a decision, visit: gricelda.breanna/ji68479.  Prostate cancer screening test: If you have a prostate and are age 55 to 69, ask your provider if you would benefit from a yearly prostate cancer screening test.  Lung cancer screening: If you are a current or former smoker age 50 to 80, ask your care team if ongoing lung cancer screenings are right for you.    For informational purposes only. Not to replace the advice of your health care provider. Copyright   2023 [facility] [employer]. All rights reserved. Clinically reviewed by the [facility] Transitions Program. [employer] 440326 - REV 6/25.  Learning About Activities of Daily Living  What are activities of daily living?     Activities of daily living (ADLs) are the basic self-care tasks you do every day. These include eating, bathing, dressing, and moving around.  As you age, and if you have health problems, you may find that it's harder to do some of these tasks. If so, your doctor can suggest ideas that may help.  To measure what kind of help you may need, your doctor will ask how well you are able to do ADLs. Let your doctor know if there are any tasks that you are having trouble doing. This is an important first step to getting help. And when you have the help you need, you can stay as independent as possible.  How will a doctor assess your ADLs?  Asking about ADLs is part of a routine health checkup your doctor will likely do as you age. Your health check might be done in a doctor's office, in your home, or at a hospital. The goal is to find out if you are having any problems that could make it hard to care for yourself or that make it unsafe for you to be on your own.  To measure your ADLs, your doctor will ask how hard it is for you to do routine tasks. Your doctor may also want to know if you have changed the way you do a task because of a health problem. Your doctor may watch how you:  Walk back and forth.  Keep  your balance while you stand or walk.  Move from sitting to standing or from a bed to a chair.  Button or unbutton a shirt or sweater.  Remove and put on your shoes.  It's common to feel a little worried or anxious if you find you can't do all the things you used to be able to do. Talking with your doctor about ADLs is a way to make sure you're as safe as possible and able to care for yourself as well as you can. You may want to bring a caregiver, friend, or family member to your checkup. They can help you talk to your doctor.  Follow-up care is a key part of your treatment and safety. Be sure to make and go to all appointments, and call your doctor if you are having problems. It's also a good idea to know your test results and keep a list of the medicines you take.  Current as of: October 24, 2024  Content Version: 14.5    5598-2188 [employer].   Care instructions adapted under license by your healthcare professional. If you have questions about a medical condition or this instruction, always ask your healthcare professional. [employer] disclaims any warranty or liability for your use of this information.

## 2025-07-30 NOTE — PROGRESS NOTES
Preventive Care Visit  [facility]  Tamika Brown MD, Family Medicine  Jul 30, 2025      Assessment & Plan     Encounter for Medicare annual wellness exam      Type 2 diabetes mellitus with stage 3b chronic kidney disease, without long-term current use of insulin (H)  Well controlled on meds, tolerating the ozempic  On aspirin and statin and arb  Eye exam UTDno concerns with feet  - Semaglutide, 2 MG/DOSE, (OZEMPIC) 8 MG/3ML pen; Inject 2 mg subcutaneously every 7 days.  - **Hemoglobin A1c FUTURE 3mo; Future  - PRIMARY CARE FOLLOW-UP SCHEDULING  - **Hemoglobin A1c FUTURE 3mo    Stage 3b chronic kidney disease (H)  Monitoring, seeing nephrology  - **Basic metabolic panel FUTURE 2mo; Future  - Albumin Random Urine Quantitative with Creat Ratio; Future  - Hemoglobin; Future  - **Basic metabolic panel FUTURE 2mo  - Albumin Random Urine Quantitative with Creat Ratio  - Hemoglobin    Essential hypertension, benign  At goal on meds  - **Basic metabolic panel FUTURE 2mo; Future  - **Basic metabolic panel FUTURE 2mo    Hyperlipidemia LDL goal <100  At goal on meds    Gouty arthritis  No flare ups on med  - Uric acid; Future  - Uric acid    Osteopenia of left hip  monitoring    Polyp of colon, unspecified part of colon, unspecified type  Due for colonoscopy next year    YANCI (obstructive sleep apnea)  CPAP working well    IgA nephropathy  Seeing nephroloby    Need for vaccination  Can get at pharmacy if desires  Patient has been advised of split billing requirements and indicates understanding: Yes    Counseling  Appropriate preventive services were addressed with this patient via screening, questionnaire, or discussion as appropriate for fall prevention, nutrition, physical activity, Tobacco-use cessation, social engagement, weight loss and cognition.  Checklist reviewing preventive services available has been given to the patient.  Reviewed patient's diet, addressing concerns and/or questions.    She is at risk for lack of exercise and has been provided with information to increase physical activity for the benefit of her well-being.   Patient reported safety concerns were addressed today.Reviewed preventive health counseling, as reflected in patient instructions       Regular exercise       Healthy diet/nutrition       Vision screening       Colorectal Cancer Screening        Haily Rick is a 69 year old, presenting for the following:  Wellness Visit        7/30/2025    11:19 AM   Additional Questions   Roomed by marko   Accompanied by self          HPI  Pt doing well  Feels good  Tolerating ozempic but not losing weight as fast as she hoped  Gained weight while on 3 week vacation  Seeing nephrology for nephropathy         Advance Care Planning    Discussed advance care planning with patient; however, patient declined at this time.        7/28/2025   General Health   How would you rate your overall physical health? Good   Feel stress (tense, anxious, or unable to sleep) Only a little   (!) STRESS CONCERN      7/28/2025   Nutrition   Diet: Regular (no restrictions)    Diabetic       Multiple values from one day are sorted in reverse-chronological order         7/28/2025   Exercise   Days per week of moderate/strenous exercise 2 days   Average minutes spent exercising at this level 40 min   (!) EXERCISE CONCERN      7/28/2025   Social Factors   Frequency of gathering with friends or relatives Twice a week   Worry food won't last until get money to buy more No   Food not last or not have enough money for food? No   Do you have housing? (Housing is defined as stable permanent housing and does not include staying outside in a car, in a tent, in an abandoned building, in an overnight shelter, or couch-surfing.) Yes   Are you worried about losing your housing? No   Lack of transportation? No   Unable to get utilities (heat,electricity)? No         7/28/2025   Fall Risk   Fallen 2 or more times in the past  year? No   Trouble with walking or balance? No          7/28/2025   Activities of Daily Living- Home Safety   Needs help with the following daily activites None of the above   Safety concerns in the home No grab bars in the bathroom         7/28/2025   Dental   Dentist two times every year? Yes         7/28/2025   Hearing Screening   Hearing concerns? None of the above         7/28/2025   Driving Risk Screening   Patient/family members have concerns about driving No         7/28/2025   General Alertness/Fatigue Screening   Have you been more tired than usual lately? No         7/28/2025   Urinary Incontinence Screening   Bothered by leaking urine in past 6 months No         Today's PHQ-2 Score:       7/30/2025    11:13 AM   PHQ-2 ( 1999 [employer])   Q1: Little interest or pleasure in doing things 0   Q2: Feeling down, depressed or hopeless 0   PHQ-2 Score 0    Q1: Little interest or pleasure in doing things Not at all   Q2: Feeling down, depressed or hopeless Not at all   PHQ-2 Score 0       Patient-reported           7/28/2025   Substance Use   Alcohol more than 3/day or more than 7/wk No   Do you have a current opioid prescription? No   How severe/bad is pain from 1 to 10? 1/10   Do you use any other substances recreationally? No     Social History     Tobacco Use    Smoking status: Never    Smokeless tobacco: Never    Tobacco comments:     Lives in smoke free household   Vaping Use    Vaping status: Never Used   Substance Use Topics    Alcohol use: Yes     Comment: two to three drinks a week    Drug use: No           12/31/2024   LAST FHS-7 RESULTS   1st degree relative breast or ovarian cancer No   Any relative bilateral breast cancer No   Any male have breast cancer No   Any ONE woman have BOTH breast AND ovarian cancer No   Any woman with breast cancer before 50yrs No   2 or more relatives with breast AND/OR ovarian cancer No   2 or more relatives with breast AND/OR bowel cancer No        Mammogram Screening -  Mammogram every 1-2 years updated in Health Maintenance based on mutual decision making      History of abnormal Pap smear: Status post hysterectomy with removal of cervix and no history of CIN2 or greater or cervical cancer. Health Maintenance and Surgical History updated.       ASCVD Risk   The 10-year ASCVD risk score (Bernadine ROCK, et al., 2019) is: 29.2%    Values used to calculate the score:      Age: 69 years      Sex: Female      Is Non- : No      Diabetic: Yes      Tobacco smoker: No      Systolic Blood Pressure: 147 mmHg      Is BP treated: Yes      HDL Cholesterol: 41 mg/dL      Total Cholesterol: 167 mg/dL            Reviewed and updated as needed this visit by Provider                      Current providers sharing in care for this patient include:  Patient Care Team:  Tamika Brown MD as PCP - General (Family Medicine)  Tamika Brown MD as Assigned PCP  Nicolás Kirby MD as MD (Dermatology)  Inés Araiza OD as Assigned Surgical Provider  Idalia Garcia PA-C as Physician Assistant (Dermatology)  Idalia Garcia PA-C as Assigned Dermatology Provider    The following health maintenance items are reviewed in Epic and correct as of today:  Health Maintenance   Topic Date Due    ANNUAL REVIEW OF HM ORDERS  Never done    RSV VACCINE (1 - Risk 60-74 years 1-dose series) Never done    ZOSTER VACCINE (2 of 2) 12/11/2020    COVID-19 VACCINE (7 - 2024-25 season) 09/01/2024    URIC ACID  07/01/2025    DIABETIC FOOT EXAM  07/08/2025    HEMOGLOBIN  07/01/2025    INFLUENZA VACCINE (1) 09/01/2025    A1C  09/25/2025    MICROALBUMIN  09/25/2025    EYE EXAM  10/14/2025    BMP  03/25/2026    LIPID  03/25/2026    COLORECTAL CANCER SCREENING  06/05/2026    MEDICARE ANNUAL WELLNESS VISIT  07/30/2026    FALL RISK ASSESSMENT  07/30/2026    MAMMO SCREENING  12/31/2026    ADVANCE CARE PLANNING  07/08/2029    DTAP/TDAP/TD VACCINE (4 - Td or Tdap) 06/29/2032    DEXA   "08/12/2039    PARATHYROID  Completed    PHOSPHORUS  Completed    HEPATITIS C SCREENING  Completed    PHQ-2 (once per calendar year)  Completed    PNEUMOCOCCAL VACCINE 50+ YEARS  Completed    URINALYSIS  Completed    ALK PHOS  Completed    HPV VACCINE (No Doses Required) Completed    MENINGITIS VACCINE  Aged Out         Review of Systems  Constitutional, HEENT, cardiovascular, pulmonary, gi and gu systems are negative, except as otherwise noted.     Objective    Exam  BP (!) 147/55   Pulse 84   Temp 98.3  F (36.8  C) (Oral)   Resp 17   Ht 1.651 m (5' 5\")   Wt 89.4 kg (197 lb)   SpO2 98%   BMI 32.78 kg/m     Estimated body mass index is 32.78 kg/m  as calculated from the following:    Height as of this encounter: 1.651 m (5' 5\").    Weight as of this encounter: 89.4 kg (197 lb).    Physical Exam  GENERAL: alert and no distress  EYES: Eyes grossly normal to inspection, PERRL and conjunctivae and sclerae normal  HENT: ear canals and TM's normal, nose and mouth without ulcers or lesions  NECK: no adenopathy, no asymmetry, masses, or scars  RESP: lungs clear to auscultation - no rales, rhonchi or wheezes  CV: regular rate and rhythm, normal S1 S2, no S3 or S4, no murmur, click or rub, no peripheral edema  ABDOMEN: soft, nontender, no hepatosplenomegaly, no masses and bowel sounds normal  MS: no gross musculoskeletal defects noted, no edema  SKIN: no suspicious lesions or rashes  NEURO: Normal strength and tone, mentation intact and speech normal  PSYCH: mentation appears normal, affect normal/bright  FOot: normal diabetic foot exam        7/30/2025   Mini Cog   Mini-Cog Not Completed (choose reason) Patient declines       Patient declines, there are NO concerns for cognitive deficits.           Signed Electronically by: Tamika Brown MD    "

## 2025-07-31 LAB
ANION GAP SERPL CALCULATED.3IONS-SCNC: 14 MMOL/L (ref 7–15)
BUN SERPL-MCNC: 31.4 MG/DL (ref 8–23)
CALCIUM SERPL-MCNC: 10.3 MG/DL (ref 8.8–10.4)
CHLORIDE SERPL-SCNC: 104 MMOL/L (ref 98–107)
CREAT SERPL-MCNC: 1.64 MG/DL (ref 0.51–0.95)
CREAT UR-MCNC: 63.1 MG/DL
EGFRCR SERPLBLD CKD-EPI 2021: 33 ML/MIN/1.73M2
GLUCOSE SERPL-MCNC: 117 MG/DL (ref 70–99)
HCO3 SERPL-SCNC: 27 MMOL/L (ref 22–29)
MICROALBUMIN UR-MCNC: 499 MG/L
MICROALBUMIN/CREAT UR: 790.81 MG/G CR (ref 0–25)
POTASSIUM SERPL-SCNC: 4.7 MMOL/L (ref 3.4–5.3)
SODIUM SERPL-SCNC: 145 MMOL/L (ref 135–145)
URATE SERPL-MCNC: 5.4 MG/DL (ref 2.4–5.7)

## 2025-08-06 ENCOUNTER — TELEPHONE (OUTPATIENT)
Dept: DERMATOLOGY | Facility: CLINIC | Age: 70
End: 2025-08-06
Payer: COMMERCIAL

## 2025-08-26 ENCOUNTER — LAB (OUTPATIENT)
Dept: LAB | Facility: CLINIC | Age: 70
End: 2025-08-26
Payer: COMMERCIAL

## 2025-08-26 DIAGNOSIS — N18.31 CHRONIC KIDNEY DISEASE (CKD) STAGE G3A/A1, MODERATELY DECREASED GLOMERULAR FILTRATION RATE (GFR) BETWEEN 45-59 ML/MIN/1.73 SQUARE METER AND ALBUMINURIA CREATININE RATIO LESS THAN 30 MG/G (H): ICD-10-CM

## 2025-08-26 LAB
ALBUMIN UR-MCNC: >=300 MG/DL
APPEARANCE UR: CLEAR
BILIRUB UR QL STRIP: NEGATIVE
COLOR UR AUTO: YELLOW
GLUCOSE UR STRIP-MCNC: NEGATIVE MG/DL
HGB UR QL STRIP: ABNORMAL
HYALINE CASTS #/AREA URNS LPF: ABNORMAL /LPF
KETONES UR STRIP-MCNC: NEGATIVE MG/DL
LEUKOCYTE ESTERASE UR QL STRIP: ABNORMAL
NITRATE UR QL: NEGATIVE
PH UR STRIP: 5.5 [PH] (ref 5–7)
RBC #/AREA URNS AUTO: ABNORMAL /HPF
SP GR UR STRIP: 1.02 (ref 1–1.03)
UROBILINOGEN UR STRIP-ACNC: 0.2 E.U./DL
WBC #/AREA URNS AUTO: ABNORMAL /HPF

## 2025-08-26 PROCEDURE — 36415 COLL VENOUS BLD VENIPUNCTURE: CPT

## 2025-08-26 PROCEDURE — 80048 BASIC METABOLIC PNL TOTAL CA: CPT

## 2025-08-26 PROCEDURE — 84156 ASSAY OF PROTEIN URINE: CPT

## 2025-08-26 PROCEDURE — 81001 URINALYSIS AUTO W/SCOPE: CPT

## 2025-08-27 LAB
ALBUMIN MFR UR ELPH: 95.9 MG/DL
ANION GAP SERPL CALCULATED.3IONS-SCNC: 14 MMOL/L (ref 7–15)
BUN SERPL-MCNC: 35.3 MG/DL (ref 8–23)
CALCIUM SERPL-MCNC: 10.5 MG/DL (ref 8.8–10.4)
CHLORIDE SERPL-SCNC: 106 MMOL/L (ref 98–107)
CREAT SERPL-MCNC: 1.65 MG/DL (ref 0.51–0.95)
CREAT UR-MCNC: 101 MG/DL
EGFRCR SERPLBLD CKD-EPI 2021: 33 ML/MIN/1.73M2
GLUCOSE SERPL-MCNC: 106 MG/DL (ref 70–99)
HCO3 SERPL-SCNC: 22 MMOL/L (ref 22–29)
POTASSIUM SERPL-SCNC: 4.5 MMOL/L (ref 3.4–5.3)
PROT/CREAT 24H UR: 0.95 MG/MG CR (ref 0–0.2)
SODIUM SERPL-SCNC: 142 MMOL/L (ref 135–145)

## (undated) DEVICE — PAD CHUX UNDERPAD 23X24" 7136

## (undated) DEVICE — KIT ENDO FIRST STEP DISINFECTANT 200ML W/POUCH EP-4

## (undated) DEVICE — PREP CHLORAPREP 26ML TINTED ORANGE  260815

## (undated) DEVICE — SOL WATER IRRIG 1000ML BOTTLE 07139-09

## (undated) RX ORDER — DIPHENHYDRAMINE HYDROCHLORIDE 50 MG/ML
INJECTION INTRAMUSCULAR; INTRAVENOUS
Status: DISPENSED
Start: 2023-06-05

## (undated) RX ORDER — FENTANYL CITRATE 50 UG/ML
INJECTION, SOLUTION INTRAMUSCULAR; INTRAVENOUS
Status: DISPENSED
Start: 2020-02-14

## (undated) RX ORDER — FENTANYL CITRATE 50 UG/ML
INJECTION, SOLUTION INTRAMUSCULAR; INTRAVENOUS
Status: DISPENSED
Start: 2023-06-05